# Patient Record
Sex: FEMALE | Race: WHITE | NOT HISPANIC OR LATINO | Employment: PART TIME | URBAN - METROPOLITAN AREA
[De-identification: names, ages, dates, MRNs, and addresses within clinical notes are randomized per-mention and may not be internally consistent; named-entity substitution may affect disease eponyms.]

---

## 2017-01-05 ENCOUNTER — HOSPITAL ENCOUNTER (OUTPATIENT)
Dept: RADIOLOGY | Facility: CLINIC | Age: 57
Discharge: HOME | End: 2017-01-05
Payer: COMMERCIAL

## 2017-01-05 DIAGNOSIS — Z11.3 ENCOUNTER FOR SCREENING FOR INFECTIONS WITH PREDOMINANTLY SEXUAL MODE OF TRANSMISSION: ICD-10-CM

## 2017-01-05 DIAGNOSIS — R07.81 PLEURODYNIA: ICD-10-CM

## 2017-01-05 DIAGNOSIS — Z12.31 ENCOUNTER FOR SCREENING MAMMOGRAM FOR MALIGNANT NEOPLASM OF BREAST: ICD-10-CM

## 2017-01-05 PROCEDURE — 71101 X-RAY EXAM UNILAT RIBS/CHEST: CPT

## 2017-02-03 ENCOUNTER — LAB REQUISITION (OUTPATIENT)
Dept: LAB | Facility: HOSPITAL | Age: 57
End: 2017-02-03
Payer: COMMERCIAL

## 2017-02-03 ENCOUNTER — ALLSCRIPTS OFFICE VISIT (OUTPATIENT)
Dept: OTHER | Facility: OTHER | Age: 57
End: 2017-02-03

## 2017-02-03 DIAGNOSIS — Z11.3 ENCOUNTER FOR SCREENING FOR INFECTIONS WITH PREDOMINANTLY SEXUAL MODE OF TRANSMISSION: ICD-10-CM

## 2017-02-03 PROCEDURE — 87591 N.GONORRHOEAE DNA AMP PROB: CPT | Performed by: OBSTETRICS & GYNECOLOGY

## 2017-02-03 PROCEDURE — 87491 CHLMYD TRACH DNA AMP PROBE: CPT | Performed by: OBSTETRICS & GYNECOLOGY

## 2017-02-08 LAB
CHLAMYDIA DNA CVX QL NAA+PROBE: NORMAL
N GONORRHOEA DNA GENITAL QL NAA+PROBE: NORMAL

## 2017-02-10 ENCOUNTER — APPOINTMENT (OUTPATIENT)
Dept: LAB | Facility: CLINIC | Age: 57
End: 2017-02-10
Payer: COMMERCIAL

## 2017-02-10 ENCOUNTER — ALLSCRIPTS OFFICE VISIT (OUTPATIENT)
Dept: OTHER | Facility: OTHER | Age: 57
End: 2017-02-10

## 2017-02-10 ENCOUNTER — HOSPITAL ENCOUNTER (OUTPATIENT)
Dept: RADIOLOGY | Facility: CLINIC | Age: 57
Discharge: HOME | End: 2017-02-10
Payer: COMMERCIAL

## 2017-02-10 DIAGNOSIS — M25.569 PAIN IN KNEE: ICD-10-CM

## 2017-02-10 DIAGNOSIS — Z11.3 ENCOUNTER FOR SCREENING FOR INFECTIONS WITH PREDOMINANTLY SEXUAL MODE OF TRANSMISSION: ICD-10-CM

## 2017-02-10 PROCEDURE — 36415 COLL VENOUS BLD VENIPUNCTURE: CPT

## 2017-02-10 PROCEDURE — 87389 HIV-1 AG W/HIV-1&-2 AB AG IA: CPT

## 2017-02-10 PROCEDURE — 87340 HEPATITIS B SURFACE AG IA: CPT

## 2017-02-10 PROCEDURE — 73562 X-RAY EXAM OF KNEE 3: CPT

## 2017-02-10 PROCEDURE — 86592 SYPHILIS TEST NON-TREP QUAL: CPT

## 2017-02-11 LAB — HBV SURFACE AG SER QL: NORMAL

## 2017-02-13 LAB
HIV 1+2 AB+HIV1 P24 AG SERPL QL IA: NORMAL
RPR SER QL: NORMAL

## 2017-03-03 ENCOUNTER — ALLSCRIPTS OFFICE VISIT (OUTPATIENT)
Dept: OTHER | Facility: OTHER | Age: 57
End: 2017-03-03

## 2017-03-08 ENCOUNTER — HOSPITAL ENCOUNTER (OUTPATIENT)
Dept: RADIOLOGY | Facility: MEDICAL CENTER | Age: 57
Discharge: HOME | End: 2017-03-08
Payer: COMMERCIAL

## 2017-03-08 DIAGNOSIS — C73 MALIGNANT NEOPLASM OF THYROID GLAND (HCC): ICD-10-CM

## 2017-03-08 PROCEDURE — 76536 US EXAM OF HEAD AND NECK: CPT

## 2017-03-10 ENCOUNTER — HOSPITAL ENCOUNTER (OUTPATIENT)
Dept: RADIOLOGY | Facility: HOSPITAL | Age: 57
Discharge: HOME | End: 2017-03-10
Payer: COMMERCIAL

## 2017-03-10 DIAGNOSIS — Z12.31 ENCOUNTER FOR SCREENING MAMMOGRAM FOR MALIGNANT NEOPLASM OF BREAST: ICD-10-CM

## 2017-03-10 PROCEDURE — G0202 SCR MAMMO BI INCL CAD: HCPCS

## 2017-03-10 PROCEDURE — 77063 BREAST TOMOSYNTHESIS BI: CPT

## 2017-03-12 ENCOUNTER — GENERIC CONVERSION - ENCOUNTER (OUTPATIENT)
Dept: OTHER | Facility: OTHER | Age: 57
End: 2017-03-12

## 2017-03-29 ENCOUNTER — ALLSCRIPTS OFFICE VISIT (OUTPATIENT)
Dept: OTHER | Facility: OTHER | Age: 57
End: 2017-03-29

## 2017-03-29 ENCOUNTER — APPOINTMENT (OUTPATIENT)
Dept: LAB | Facility: HOSPITAL | Age: 57
End: 2017-03-29
Payer: COMMERCIAL

## 2017-03-29 DIAGNOSIS — C73 MALIGNANT NEOPLASM OF THYROID GLAND (HCC): ICD-10-CM

## 2017-03-29 DIAGNOSIS — E89.0 POSTPROCEDURAL HYPOTHYROIDISM: ICD-10-CM

## 2017-03-29 LAB
T4 FREE SERPL-MCNC: 1.34 NG/DL (ref 0.76–1.46)
TSH SERPL DL<=0.05 MIU/L-ACNC: 0.99 UIU/ML (ref 0.36–3.74)

## 2017-03-29 PROCEDURE — 84443 ASSAY THYROID STIM HORMONE: CPT

## 2017-03-29 PROCEDURE — 36415 COLL VENOUS BLD VENIPUNCTURE: CPT

## 2017-03-29 PROCEDURE — 84432 ASSAY OF THYROGLOBULIN: CPT

## 2017-03-29 PROCEDURE — 84439 ASSAY OF FREE THYROXINE: CPT

## 2017-03-29 PROCEDURE — 86800 THYROGLOBULIN ANTIBODY: CPT

## 2017-04-04 ENCOUNTER — GENERIC CONVERSION - ENCOUNTER (OUTPATIENT)
Dept: OTHER | Facility: OTHER | Age: 57
End: 2017-04-04

## 2017-04-04 LAB
THYROGLOB AB SERPL-ACNC: 3.5 IU/ML (ref 0–0.9)
THYROGLOB SERPL-MCNC: <2 NG/ML

## 2017-08-25 ENCOUNTER — ALLSCRIPTS OFFICE VISIT (OUTPATIENT)
Dept: OTHER | Facility: OTHER | Age: 57
End: 2017-08-25

## 2017-10-17 ENCOUNTER — ALLSCRIPTS OFFICE VISIT (OUTPATIENT)
Dept: OTHER | Facility: OTHER | Age: 57
End: 2017-10-17

## 2017-10-17 DIAGNOSIS — Z12.11 ENCOUNTER FOR SCREENING FOR MALIGNANT NEOPLASM OF COLON: ICD-10-CM

## 2017-10-17 DIAGNOSIS — D64.9 ANEMIA: ICD-10-CM

## 2017-10-17 DIAGNOSIS — K92.1 MELENA: ICD-10-CM

## 2017-10-17 DIAGNOSIS — E55.9 VITAMIN D DEFICIENCY: ICD-10-CM

## 2017-10-17 DIAGNOSIS — K91.2 POSTSURGICAL MALABSORPTION, NOT ELSEWHERE CLASSIFIED (CODE): ICD-10-CM

## 2017-10-17 DIAGNOSIS — Z13.220 ENCOUNTER FOR SCREENING FOR LIPOID DISORDERS: ICD-10-CM

## 2017-10-20 ENCOUNTER — APPOINTMENT (OUTPATIENT)
Dept: LAB | Facility: CLINIC | Age: 57
End: 2017-10-20
Payer: COMMERCIAL

## 2017-10-20 DIAGNOSIS — Z13.220 ENCOUNTER FOR SCREENING FOR LIPOID DISORDERS: ICD-10-CM

## 2017-10-20 DIAGNOSIS — E55.9 VITAMIN D DEFICIENCY: ICD-10-CM

## 2017-10-20 DIAGNOSIS — D64.9 ANEMIA: ICD-10-CM

## 2017-10-20 DIAGNOSIS — K91.2 POSTSURGICAL MALABSORPTION, NOT ELSEWHERE CLASSIFIED (CODE): ICD-10-CM

## 2017-10-20 LAB
25(OH)D3 SERPL-MCNC: 34.4 NG/ML (ref 30–100)
ALBUMIN SERPL BCP-MCNC: 3.3 G/DL (ref 3.5–5)
ALP SERPL-CCNC: 83 U/L (ref 46–116)
ALT SERPL W P-5'-P-CCNC: 19 U/L (ref 12–78)
ANION GAP SERPL CALCULATED.3IONS-SCNC: 6 MMOL/L (ref 4–13)
AST SERPL W P-5'-P-CCNC: 21 U/L (ref 5–45)
BASOPHILS # BLD AUTO: 0.01 THOUSANDS/ΜL (ref 0–0.1)
BASOPHILS NFR BLD AUTO: 0 % (ref 0–1)
BILIRUB SERPL-MCNC: 0.51 MG/DL (ref 0.2–1)
BUN SERPL-MCNC: 7 MG/DL (ref 5–25)
CALCIUM SERPL-MCNC: 8.6 MG/DL (ref 8.3–10.1)
CHLORIDE SERPL-SCNC: 109 MMOL/L (ref 100–108)
CHOLEST SERPL-MCNC: 149 MG/DL (ref 50–200)
CO2 SERPL-SCNC: 26 MMOL/L (ref 21–32)
CREAT SERPL-MCNC: 0.68 MG/DL (ref 0.6–1.3)
EOSINOPHIL # BLD AUTO: 0.04 THOUSAND/ΜL (ref 0–0.61)
EOSINOPHIL NFR BLD AUTO: 1 % (ref 0–6)
ERYTHROCYTE [DISTWIDTH] IN BLOOD BY AUTOMATED COUNT: 16.7 % (ref 11.6–15.1)
FERRITIN SERPL-MCNC: 4 NG/ML (ref 8–388)
GFR SERPL CREATININE-BSD FRML MDRD: 98 ML/MIN/1.73SQ M
GLUCOSE P FAST SERPL-MCNC: 92 MG/DL (ref 65–99)
HCT VFR BLD AUTO: 30.1 % (ref 34.8–46.1)
HDLC SERPL-MCNC: 68 MG/DL (ref 40–60)
HGB BLD-MCNC: 8.5 G/DL (ref 11.5–15.4)
IRON SERPL-MCNC: 19 UG/DL (ref 50–170)
LDLC SERPL CALC-MCNC: 66 MG/DL (ref 0–100)
LYMPHOCYTES # BLD AUTO: 1.95 THOUSANDS/ΜL (ref 0.6–4.47)
LYMPHOCYTES NFR BLD AUTO: 45 % (ref 14–44)
MCH RBC QN AUTO: 21.4 PG (ref 26.8–34.3)
MCHC RBC AUTO-ENTMCNC: 28.2 G/DL (ref 31.4–37.4)
MCV RBC AUTO: 76 FL (ref 82–98)
MONOCYTES # BLD AUTO: 0.5 THOUSAND/ΜL (ref 0.17–1.22)
MONOCYTES NFR BLD AUTO: 11 % (ref 4–12)
NEUTROPHILS # BLD AUTO: 1.91 THOUSANDS/ΜL (ref 1.85–7.62)
NEUTS SEG NFR BLD AUTO: 43 % (ref 43–75)
NRBC BLD AUTO-RTO: 0 /100 WBCS
PLATELET # BLD AUTO: 282 THOUSANDS/UL (ref 149–390)
PMV BLD AUTO: 9.4 FL (ref 8.9–12.7)
POTASSIUM SERPL-SCNC: 4.7 MMOL/L (ref 3.5–5.3)
PROT SERPL-MCNC: 7 G/DL (ref 6.4–8.2)
RBC # BLD AUTO: 3.98 MILLION/UL (ref 3.81–5.12)
SODIUM SERPL-SCNC: 141 MMOL/L (ref 136–145)
T4 FREE SERPL-MCNC: 1.77 NG/DL (ref 0.76–1.46)
TRIGL SERPL-MCNC: 76 MG/DL
TSH SERPL DL<=0.05 MIU/L-ACNC: 0.01 UIU/ML (ref 0.36–3.74)
VIT B12 SERPL-MCNC: 1835 PG/ML (ref 100–900)
WBC # BLD AUTO: 4.42 THOUSAND/UL (ref 4.31–10.16)

## 2017-10-20 PROCEDURE — 84425 ASSAY OF VITAMIN B-1: CPT

## 2017-10-20 PROCEDURE — 80061 LIPID PANEL: CPT

## 2017-10-20 PROCEDURE — 82728 ASSAY OF FERRITIN: CPT

## 2017-10-20 PROCEDURE — 83036 HEMOGLOBIN GLYCOSYLATED A1C: CPT

## 2017-10-20 PROCEDURE — 84443 ASSAY THYROID STIM HORMONE: CPT

## 2017-10-20 PROCEDURE — 80053 COMPREHEN METABOLIC PANEL: CPT

## 2017-10-20 PROCEDURE — 82607 VITAMIN B-12: CPT

## 2017-10-20 PROCEDURE — 85025 COMPLETE CBC W/AUTO DIFF WBC: CPT

## 2017-10-20 PROCEDURE — 82306 VITAMIN D 25 HYDROXY: CPT

## 2017-10-20 PROCEDURE — 84439 ASSAY OF FREE THYROXINE: CPT

## 2017-10-20 PROCEDURE — 83540 ASSAY OF IRON: CPT

## 2017-10-20 PROCEDURE — 84207 ASSAY OF VITAMIN B-6: CPT

## 2017-10-21 LAB
EST. AVERAGE GLUCOSE BLD GHB EST-MCNC: 94 MG/DL
HBA1C MFR BLD: 4.9 % (ref 4.2–6.3)

## 2017-10-22 ENCOUNTER — GENERIC CONVERSION - ENCOUNTER (OUTPATIENT)
Dept: OTHER | Facility: OTHER | Age: 57
End: 2017-10-22

## 2017-10-23 LAB — VIT B1 BLD-SCNC: 107.6 NMOL/L (ref 66.5–200)

## 2017-10-25 LAB — VIT B6 SERPL-MCNC: 11.5 UG/L (ref 2–32.8)

## 2017-10-30 ENCOUNTER — APPOINTMENT (OUTPATIENT)
Dept: LAB | Facility: CLINIC | Age: 57
End: 2017-10-30
Payer: COMMERCIAL

## 2017-10-30 DIAGNOSIS — Z12.11 ENCOUNTER FOR SCREENING FOR MALIGNANT NEOPLASM OF COLON: ICD-10-CM

## 2017-10-30 LAB — HEMOCCULT STL QL IA: POSITIVE

## 2017-10-30 PROCEDURE — G0328 FECAL BLOOD SCRN IMMUNOASSAY: HCPCS

## 2017-10-31 ENCOUNTER — ALLSCRIPTS OFFICE VISIT (OUTPATIENT)
Dept: OTHER | Facility: OTHER | Age: 57
End: 2017-10-31

## 2017-10-31 ENCOUNTER — GENERIC CONVERSION - ENCOUNTER (OUTPATIENT)
Dept: OTHER | Facility: OTHER | Age: 57
End: 2017-10-31

## 2017-11-02 RX ORDER — SODIUM CHLORIDE 9 MG/ML
20 INJECTION, SOLUTION INTRAVENOUS ONCE
Status: COMPLETED | OUTPATIENT
Start: 2017-11-03 | End: 2017-11-03

## 2017-11-02 NOTE — PROGRESS NOTES
Assessment  1  Papillary carcinoma of thyroid (193) (C73)   2  Osteoporosis (733 00) (M81 0)   3  Postsurgical hypothyroidism (244 0) (E89 0)   4  Vitamin D deficiency (268 9) (E55 9)    Plan  Osteoporosis    · Forteo 600 MCG/2 4ML Subcutaneous Solution   Rx By: Tatianna Cohen; Dispense: 30 Days ; #:1 X 2 4 ML Pen; Refill: 6;For: Osteoporosis; CECIL = N; Verified Transmission to [facility] DUSTIN PETERSEN; Last Updated By: Curry Todd; 10/31/2017 1:12:32 PM  Papillary carcinoma of thyroid    · (1) T4, FREE; Status:Active; Requested for:68Sqd2791;    Perform:[facility]; Due:71Kys8464; Ordered; For:Papillary carcinoma of thyroid; Ordered By:Thuy Gonzáles;   · (1) THYROGLOBULIN/QUANT W/ANTIBODY PANEL; Status:Active; Requested  for:70Bzq5483;    Perform:[facility]; Due:11Mzh3504; Ordered; For:Papillary carcinoma of thyroid; Ordered By:Thuy Gonzáles;   · (1) TSH; Status:Active; Requested for:92Woh2108;    Perform:[facility]; Due:63Hka9281; Ordered; For:Papillary carcinoma of thyroid; Ordered By:Thuy Gonzáles;   · US HEAD NECK LYMPH NODE MAPPING; Status:Hold For - Scheduling; Requested  for:01Mar2018; Perform:[facility]; EZV:19ZES0156;UHIPFQE; For:Papillary carcinoma of thyroid; Ordered By:Thuy Gonzáles;   · Follow-up visit in 6 months Evaluation and Treatment  Follow-up  Status: Hold For -  Scheduling  Requested for: 20CRV2307   Ordered; For: Papillary carcinoma of thyroid; Ordered By: Tatianna Cohen Performed:  Due: 50QXA6817  Unlinked    · Melatonin 10 MG Oral Capsule   Dispense: 0 Days ; #:0 CAPS; Refill: 0; CECIL = N; Record; Last Updated By: Curry Todd; 10/31/2017 1:12:43 PM    Papillary Thyroid CA: Continue to monitor Thyroglobulin Panel and Ultrasound  Hypothyroidism: Recent lab testing showed overreplacement of Synthroid but she reports was taking 2 extra tabs per week instead of 1 extra tab per week due to fatigue    Return to taking 1 extra tab per week and check TSH/Free T4 in 6 weeks  Osteoporosis:  Took Forteo for 6 weeks and stopped for side effects of headache/nausea  These are not typical side effects of forteo and she did tolerate forteo in the past x 18 months  When her Anemia/GI Workup is complete and she is feeling back to normal she will give forteo another try  IF can not tolerate, she will let us know and will try to get coverage for prolia  Vitamin D Deficiency: Continue supplements  Follow up in 6 months     Chief Complaint  Chief Complaint Free Text Note Form: Follow Up      History of Present Illness  HPI: I had the pleasure of seeing Kassidy Aguila in the office today for follow-up of thyroid cancer, postsurgical hypothyroidism, Vitamin D Deficiency, and osteoporosis  She has a history of follicular variant papillary thyroid cancer with Oncocytic features  She underwent total completion thyroidectomy and radioactive iodine ablation in 2010  She underwent Thyrogen stimulated thyroglobulin level/Scan in 2014 which showed no evidence of recurrent/metastatic disease  Thyroid Antibodies have been mildly elevated/stable  Ultrasound 3/2017 showed no evidence of disease    her postsurgical hypothyroidism, she is currently taking Synthroid 112 mcg Mon-Fri and 2 tabs on Sat/sunday  She self increased this over the summer due to severe fatigue  Turns out she has severe iron deficiency anemia and Heme + stools and will be seeing GI for this and getting iron infusions  her osteoporosis, she has completed 18 months of the Forteo a few years back  Recently started an additional 6 month treatment course of forteo but stopped after 6 weeks due to headaches/nausea over the summer  In the past, she tolerated forteo with no problems    She had 1 treatment with reclast in November 2013 and second treatment 3/23/16  She would not tolerate/absorb oral bisphonates due to hx gastric bypass surgery   She is getting a lot of calcium in diet, but not taking supplements   She is taking her vitamin D 5000 units daily  She did have a right femoral neck stress fracture  This required surgical repair  Review of Systems  ROS Reviewed:   ROS reviewed  Endo Adult ROS Female Established v2 Update - [facility]:   Constitutional/General: recent weight gain,-- no recent weight loss,-- poor energy/fatigue,-- no increased energy level,-- insomnia/sleep problems,-- no fever-- and-- no feeling weak  Breasts: no nipple discharge  Heart: chest pain/tightness,-- rapid/racing heart rate-- and-- palpitations, but-- no high blood pressure  Genitourinary - Urinary: no frequent urination,-- no excess urination-- and-- no urinating during the night  Eyes: no blurred vision,-- no double vision,-- no bulging eyes,-- no gritty/scratchy eyes-- and-- no excessive tearing  Mouth / Throat: no hoarseness-- and-- no difficulty swallowing  Neck: no lumps,-- no swollen glands,-- no neck pain,-- no neck stiffness-- and-- no enlarged thyroid  Respiratory: no wheezing,-- no asthma-- and-- persistent cough  Musculoskeletal: muscle aches/pain,-- joint aches/pain-- and-- muscle weakness  Skin & Hair: no dry skin,-- no acne,-- the hair texture was not oily,-- no hair loss-- and-- no excessive hair growth  Gastrointestinal: no constipation,-- no diarrhea,-- no waking at night to drink-- and-- no stomach ache  Neurological: no blackouts,-- no weakness-- and-- no tremors  Reproductive: no discomfort with periods,-- no excessive bleeding with periods-- and-- no mood swings--   regular periods are not applicable  Endocrine: no feeling hot frequently,-- feeling cold frequently,-- no shifts between feeling hot and cold,-- cold hands or feet,-- no excessive sweating,-- thyroid problems,-- no blood sugar problems,-- no excessive thirst,-- no excessive hunger,-- no change in shoe size,-- no nausea or vomiting-- and-- no shaky hands  Active Problems  1   Anemia (285  9) (D64 9)   2  Anxiety (300 00) (F41 9)   3  Dupuytren's contracture (728 6) (M72 0)   4  Encounter for routine gynecological examination (V72 31) (Z01 419)   5  Encounter for screening mammogram for malignant neoplasm of breast (V76 12)   (Z12 31)   6  Hip pain (719 45) (M25 559)   7  Lipid screening (V77 91) (Z13 220)   8  Need for prophylactic vaccination and inoculation against influenza (V04 81) (Z23)   9  Osteoporosis (733 00) (M81 0)   10  Papillary carcinoma of thyroid (193) (C73)   11  Pernicious anemia (281 0) (D51 0)   12  Postgastrectomy malabsorption (579 3) (K91 2,Z90 3)   13  Postsurgical hypothyroidism (244 0) (E89 0)   14  Primary osteoarthritis of left knee (715 16) (M17 12)   15  Screen for STD (sexually transmitted disease) (V74 5) (Z11 3)   16  Screening for depression (V79 0) (Z13 89)   17  Seasonal allergies (477 9) (J30 2)   18  Special screening for malignant neoplasms, colon (V76 51) (Z12 11)   19  Status post gastric bypass for obesity (V45 86) (Z98 84)   20  Vitamin D deficiency (268 9) (E55 9)   21  Well adult on routine health check (V70 0) (Z00 00)    Past Medical History  1  History of Closed Rib Fracture (807 00)   2  History of fall (V15 88) (Z91 81)   3  History of hysterectomy (V88 01) (Z90 710)   4  History of malignant neoplasm of thyroid (V10 87) (Z85 850)   5  History of viral gastroenteritis (V12 09) (Z86 19)  Active Problems And Past Medical History Reviewed: The active problems and past medical history were reviewed and updated today  Surgical History  1  History of Appendectomy (47 0)   2  History of Breast Surgery   3  History of  Section   4  History of Cholecystectomy   5  History of Gastric Surgery For Morbid Obesity Gastric Bypass   6  History of Hip Surgery Right   7  History of Thyroid Surgery   8  History of Total Abdominal Hysterectomy  Surgical History Reviewed: The surgical history was reviewed and updated today         Family History  Mother    1  Family history of cardiac disorder (V17 49) (Z82 49)   2  Family history of diabetes mellitus (V18 0) (Z83 3)  Father    3  Family history of Cancer of mouth  Maternal Grandfather    4  Family history of Bone cancer   5  Family history of malignant neoplasm of brain (V16 8) (Z80 8)   6  Family history of Lung Cancer (V16 1)   7  Family history of Skin Cancer (V16 8)  Paternal Aunt    6  Family history of lung cancer (V16 1) (Z80 1)  Maternal Cousin    9  Family history of breast cancer (V16 3) (Z80 3)   10  Family history of Melanoma  Family History    11  Family history of Coronary Artery Disease (V17 49)   12  Family history of Osteoporosis (V17 81)   13  Family history of Rheumatoid Arthritis  Family History Reviewed: The family history was reviewed and updated today  Social History   · Being A Social Drinker   · Caffeine Use   · Daily Coffee Consumption (4  Cups/Day)   · Denied: History of Drug use   · Exercises regularly   · Never A Smoker  Social History Reviewed: The social history was reviewed and updated today  The social history was reviewed and is unchanged  Current Meds   1  ALPRAZolam 0 5 MG Oral Tablet; take 1 tablet every twelve hours; Last Rx:53Bxo6991   Ordered   2  BD Pen Needle Sarah U/F 32G X 4 MM Miscellaneous; Use 1 per day with forteo    Requested for: 14TWQ2039; Last Rx:15May2017 Ordered   3  Calcium 500 MG TABS; TAKE 1 TABLET DAILY; Therapy: 45OIB6581 to Recorded   4  Claritin 10 MG Oral Tablet; Therapy: (Recorded:97Kjl4908) to Recorded   5  Forteo 600 MCG/2 4ML Subcutaneous Solution; INJECT 20 MCG  SUBCUTANEOUSLY   ONCE DAILY AS DIRECTED; Therapy: 48KUC9238 to (Evaluate:69Egv1152)  Requested for: 04JRR9361; Last   Rx:21Cpb9030 Ordered   6  Yissel Root CAPS; Therapy: (Recorded:79Mle2189) to Recorded   7  Melatonin 10 MG Oral Capsule; Therapy: (Recorded:53Qqr1904) to Recorded   8   Synthroid 112 MCG Oral Tablet; TAKE 1 TABLET DAILY mon-sat and  2 tablets on sunday; Therapy: 03IFJ2483 to (Evaluate:24Jun2018)  Requested for: 49AYF0252; Last   Rx:29Jun2017 Ordered   9  Vitamin B12 TABS; Therapy: (Recorded:49Bfw1739) to Recorded   10  Vitamin D-3 5000 UNIT Oral Tablet; Therapy: (Recorded:22Oyh8902) to Recorded  Medication List Reviewed: The medication list was reviewed and updated today  Allergies  1  Bactrim TABS   2  Scopolamine HBr SOLN   3  Sulfa Drugs  4  Adhesive Tape   5  No Known Environmental Allergies   6  Seasonal    Vitals  Vital Signs    Recorded: 97XJF1711 01:12PM   Heart Rate 80   Systolic 418   Diastolic 78   Height 5 ft 7 in   Weight 161 lb 4 00 oz   BMI Calculated 25 26   BSA Calculated 1 85     Physical Exam    Constitutional   General appearance: No acute distress, well appearing and well nourished  Eyes   Conjunctiva and lids: No swelling, erythema, or discharge  Pupils: Equal, round and reactive to light  The sclera are anicteric  Extraocular movements are intact  Ears, Nose, Mouth, and Throat   External inspection of ears, nose and lips: Normal     Oropharynx: Normal with no erythema, edema, exudate or lesions  Exam of Head: The head is atraumatic and normocephalic  Neck: The neck is supple  The thyroid is normal in size with no palpable nodules  Pulmonary   Auscultation of lungs: Clear to auscultation bilaterally with normal chest expansion  Cardiovascular   Auscultation of heart: Normal rate and rhythm with no murmurs, gallops or rubs  Examination of pulses: Dorsalis pedal pulses are +2 and equal bilaterally  Examination of carotids: No bruit    Abdomen   Abdomen: Abdomen is soft, non-tender with normal bowel sounds  Lymphatic   Palpation of lymph nodes: No supraclavicular or suboccipital lymphadenopathy  Musculoskeletal   Inspection/palpation of joints, bones, and muscles: Muscle bulk and tone is normal     Skin   Skin and subcutaneous tissue: Normal skin temperature and color  Neurologic   Reflexes: 2+ and symmetric  Motor Strength: Strength is 5/5 bilaterally  Psychiatric   Orientation to person, place and time: Normal     Mood and affect: Affect and attention span are normal        Results/Data  (1) OCCULT BLOOD, FECAL IMMUNOCHEMICAL TEST 30Oct2017 01:48PM Mavis Sauer   TW Order Number: [id number]     Test Name Result Flag Reference   OCCULT BLD, FECAL IMMUNOLOGICAL Positive A Negative   Performed by Fecal Immunochemical Test      (1) CBC/PLT/DIFF 49SUX0299 07:23AM Mavis Sauer     Test Name Result Flag Reference   WBC COUNT 4 42 Thousand/uL  4 31-10 16   RBC COUNT 3 98 Million/uL  3 81-5 12   HEMOGLOBIN 8 5 g/dL L 11 5-15 4   HEMATOCRIT 30 1 % L 34 8-46  1   MCV 76 fL L 82-98   MCH 21 4 pg L 26 8-34 3   MCHC 28 2 g/dL L 31 4-37 4   RDW 16 7 % H 11 6-15 1   MPV 9 4 fL  8 9-12 7   PLATELET COUNT 239 Thousands/uL  149-390   nRBC AUTOMATED 0 /100 WBCs     NEUTROPHILS RELATIVE PERCENT 43 %  43-75   LYMPHOCYTES RELATIVE PERCENT 45 % H 14-44   MONOCYTES RELATIVE PERCENT 11 %  4-12   EOSINOPHILS RELATIVE PERCENT 1 %  0-6   BASOPHILS RELATIVE PERCENT 0 %  0-1   NEUTROPHILS ABSOLUTE COUNT 1 91 Thousands/? ??L  1 85-7 62   LYMPHOCYTES ABSOLUTE COUNT 1 95 Thousands/? ??L  0 60-4 47   MONOCYTES ABSOLUTE COUNT 0 50 Thousand/? ??L  0 17-1 22   EOSINOPHILS ABSOLUTE COUNT 0 04 Thousand/? ??L  0 00-0 61   BASOPHILS ABSOLUTE COUNT 0 01 Thousands/? ??L  0 00-0 10     (1) COMPREHENSIVE METABOLIC PANEL 94AWD1880 59:24VP Mavis Sauer     Test Name Result Flag Reference   SODIUM 141 mmol/L  136-145   POTASSIUM 4 7 mmol/L  3 5-5 3   CHLORIDE 109 mmol/L H 100-108   CARBON DIOXIDE 26 mmol/L  21-32   ANION GAP (CALC) 6 mmol/L  4-13   BLOOD UREA NITROGEN 7 mg/dL  5-25   CREATININE 0 68 mg/dL  0 60-1 30   Standardized to IDMS reference method   CALCIUM 8 6 mg/dL  8 3-10 1   BILI, TOTAL 0 51 mg/dL  0 20-1 00   ALK PHOSPHATAS 83 U/L     ALT (SGPT) 19 U/L  12-78   Specimen collection should occur prior to Sulfasalazine and/or Sulfapyridine administration due to the potential for falsely depressed results  AST(SGOT) 21 U/L  5-45   Specimen collection should occur prior to Sulfasalazine administration due to the potential for falsely depressed results  ALBUMIN 3 3 g/dL L 3 5-5 0   TOTAL PROTEIN 7 0 g/dL  6 4-8 2   eGFR 98 ml/min/1 73sq m     National Kidney Disease Education Program recommendations are as follows:  GFR calculation is accurate only with a steady state creatinine  Chronic Kidney disease less than 60 ml/min/1 73 sq  meters  Kidney failure less than 15 ml/min/1 73 sq  meters  GLUCOSE FASTING 92 mg/dL  65-99   Specimen collection should occur prior to Sulfasalazine administration due to the potential for falsely depressed results  Specimen collection should occur prior to Sulfapyridine administration due to the potential for falsely elevated results  (1) HEMOGLOBIN A1C 20Oct2017 07:23AM Vinay Hard     Test Name Result Flag Reference   HEMOGLOBIN A1C 4 9 %  4 2-6 3   EST  AVG  GLUCOSE 94 mg/dl       (1) LIPID PANEL FASTING W DIRECT LDL REFLEX 20Oct2017 07:23AM Vinay Hard     Test Name Result Flag Reference   CHOLESTEROL 149 mg/dL     LDL CHOLESTEROL CALCULATED 66 mg/dL  0-100   Triglyceride:        Normal <150 mg/dl   Borderline High 150-199 mg/dl   High 200-499 mg/dl   Very High >499 mg/dl      Cholesterol:       Desirable <200 mg/dl    Borderline High 200-239 mg/dl    High >239 mg/dl      HDL Cholesterol:       High>59 mg/dL    Low <41 mg/dL      HDL Cholesterol:       High>59 mg/dL    Low <41 mg/dL      This screening LDL is a calculated result  It does not have the accuracy of the Direct Measured LDL in the monitoring of patients with hyperlipidemia and/or statin therapy  Direct Measure LDL (ZWF779) must be ordered separately in these patients     TRIGLYCERIDES 76 mg/dL  <=150   Specimen collection should occur prior to N-Acetylcysteine or Metamizole administration due to the potential for falsely depressed results  HDL,DIRECT 68 mg/dL H 40-60   Specimen collection should occur prior to Metamizole administration due to the potential for falsley depressed results  (1) TSH WITH FT4 REFLEX 20Oct2017 07:23AM Gilda Proffer     Test Name Result Flag Reference   TSH 0 012 uIU/mL L 0 358-3 740   Patients undergoing fluorescein dye angiography may retain small amounts of fluorescein in the body for 48-72 hours post procedure  Samples containing fluorescein can produce falsely depressed TSH values  If the patient had this procedure,a specimen should be resubmitted post fluorescein clearance  The recommended reference ranges for TSH during pregnancy are as follows:  First trimester 0 1 to 2 5 uIU/mL  Second trimester  0 2 to 3 0 uIU/mL  Third trimester 0 3 to 3 0 uIU/m   T4,FREE 1 77 ng/dL H 0 76-1 46   Specimen collection should occur prior to Sulfasalazine administration due to the potential for falsely elevated results  (1) IRON 53CIB5513 07:23AM Gilda Proffer     Test Name Result Flag Reference   IRON 19 ug/dL L    Patients treated with metal-binding drugs (ie  Deferoxamine) may have depressed iron values       (1) FERRITIN 44ZLA1657 07:23AM Gilda Proffer     Test Name Result Flag Reference   FERRITIN 4 ng/mL L 8-388     (1) THYROGLOBULIN/QUANT W/ANTIBODY PANEL 76RDD1879 04:14PM Arnel Huggins Order Number: [id number]     Test Name Result Flag Reference   THYROGLOB AB 3 5 IU/mL H 0 0 - 0 9   Thyroglobulin Antibody measured by [facility] Methodology    Performed at:  [address]  [id number]  : Jann Ellis MD, Phone:  [phone]   THYROGLOBULIN (TG-KEV) <2 0 ng/mL     Reference Range:  Pubertal Children  and Adults: <40  According to the [facility] of Clinical Biochemistry,  the reference interval for Thyroglobulin (TG) should be  related to euthyroid patients and not for patients who  underwent thyroidectomy  TG reference intervals for these  patients depend on the residual mass of the thyroid tissue  left after surgery  Establishing a post-operative baseline  is recommended  The assay quantitation limit is 2 0 ng/mL  Performed at:  [address] [facility]  [address] [place] [address]  [de-identified]  : Karen Lopez MD, Phone:  [phone] [address] [id number] 01:21PM Sasah Rodríguez Order Number: [id number]    - Patient Instructions: To schedule this appointment, please contact Central Scheduling at [phone]  Test Name Result Flag Reference   US HEAD NECK LYMPH NODE [address] [facility] (Report)     NECK ULTRASOUND     INDICATION:  History of papillary carcinoma  COMPARISON: 3/14/2016     FINDINGS:     Ultrasound of the thyroidectomy bed and cervical lymph node chains was performed with a high frequency linear transducer  There is no suspicion of recurrent mass in the thyroidectomy bed  Lymph nodes maintain normal morphologic contour, echogenicity and short axis dimensions of less than 0 7 cm  No evidence for microcalcification or focal nodularity  IMPRESSION:     No evidence of recurrent or metastatic disease  Workstation performed: [id number]     Signed by:   Zachary Kohler MD   3/9/17     Health Management  Encounter for routine gynecological examination   BREAST EXAM; every 1 year; Last 87FTH8221; Next Due: 43Vqe7307; Active  PELVIC EXAM; every 1 year; Last 88BQV7716; Next Due: 22Qzz1629;  Active    Future Appointments    Date/Time Provider Specialty Site   10/18/2018 09:15 AM Amanda Davis DO Family Medicine [facility] [facility]     Signatures   Electronically signed by : Hailey Anaya [facility]; Oct 31 2017  1:49PM EST                       (Author)    Electronically signed by : CONRAD Paulino ; Nov 1 2017  9:10AM EST

## 2017-11-03 ENCOUNTER — HOSPITAL ENCOUNTER (OUTPATIENT)
Dept: INFUSION CENTER | Facility: HOSPITAL | Age: 57
Discharge: HOME | End: 2017-11-03
Payer: COMMERCIAL

## 2017-11-03 VITALS
HEART RATE: 79 BPM | DIASTOLIC BLOOD PRESSURE: 83 MMHG | RESPIRATION RATE: 18 BRPM | SYSTOLIC BLOOD PRESSURE: 142 MMHG | OXYGEN SATURATION: 100 % | TEMPERATURE: 97.4 F

## 2017-11-03 PROCEDURE — 96365 THER/PROPH/DIAG IV INF INIT: CPT

## 2017-11-03 PROCEDURE — 96366 THER/PROPH/DIAG IV INF ADDON: CPT

## 2017-11-03 RX ADMIN — IRON SUCROSE 300 MG: 20 INJECTION, SOLUTION INTRAVENOUS at 08:41

## 2017-11-03 RX ADMIN — SODIUM CHLORIDE 20 ML/HR: 0.9 INJECTION, SOLUTION INTRAVENOUS at 08:43

## 2017-11-03 NOTE — PLAN OF CARE
Problem: Potential for Falls  Goal: Patient will remain free of falls  INTERVENTIONS:  - Assess patient frequently for physical needs  -  Identify cognitive and physical deficits and behaviors that affect risk of falls    -  [place] fall precautions as indicated by assessment   - Educate patient/family on patient safety including physical limitations  - Instruct patient to call for assistance with activity based on assessment  - Modify environment to reduce risk of injury  - Consider OT/PT consult to assist with strengthening/mobility   Outcome: Progressing

## 2017-11-08 ENCOUNTER — ALLSCRIPTS OFFICE VISIT (OUTPATIENT)
Dept: OTHER | Facility: OTHER | Age: 57
End: 2017-11-08

## 2017-11-09 RX ORDER — SODIUM CHLORIDE 9 MG/ML
20 INJECTION, SOLUTION INTRAVENOUS ONCE
Status: COMPLETED | OUTPATIENT
Start: 2017-11-11 | End: 2017-11-11

## 2017-11-10 NOTE — CONSULTS
Assessment  1  Anemia (285 9) (D64 9)   2  Blood in stool (578 1) (K92 1)   3  GERD (gastroesophageal reflux disease) (530 81) (K21 9)   4  Change in bowel habits (787 99) (R19 4)   5  Status post gastric bypass for obesity (V45 86) (Z98 84)   6  Special screening for malignant neoplasms, colon (V76 51) (Z12 11)    Plan  Anemia, Blood in stool    · (1) CBC/ PLT (NO DIFF); Status:Active; Requested TSV:[id number];    Perform:[facility]; YFV:[id number]; Ordered; For:Anemia, Blood in stool; Ordered By:Melida Vallecillo;  GERD (gastroesophageal reflux disease)    · COLONOSCOPY (GI, SURG); Status:Hold For - Scheduling; Requested WGU:[id number];    Perform:[facility]; BSR:59LHB5586; Ordered; For:GERD (gastroesophageal reflux disease); Ordered By:SIM Vallecillo;   · EGD; Status:Hold For - Scheduling; Requested EDZ:42KJR3969;    Perform:[facility]; PMB:44RKI0068;VCBDTQQ;(RSZQNCRRIEDUFQDY reflux disease); Ordered By:Melida Vallecillo;    Discussion/Summary  Discussion Summary: 1  Iron deficiency anemia likely secondary to malabsorption however patient is also noted to have positive fecal occult blood testing, therefore will plan for EGD and colonoscopy  If EGD and colonoscopy are unremarkable, she will need PillCam for further evaluation  CBC, as patient complains of fatigue  is instructed to go to the emergency room should she have worsening fatigue, palpitations or chest pain  is starting her iron infusions, first infusion as this Saturday  Intermittent episodes of reflux, doing well on when necessary Prilosec  NSAIDs  Colon cancer screening: average risk, no prior colonoscopy, patient is noted to be anemic and has positive fecal occult blood test  We'll schedule colonoscopy  She states that her bowel movements are softer this year than they have in the past, could be related to diet or secondary to thyroid  Encourage high-fiber diet        Chief Complaint  Chief Complaint Free Text Note Form: Iron deficiency anemia      History of Present Illness  HPI: This 28-year-old female With history of papillary thyroid cancer status post radiation and iodine treatment,who comes in for evaluation of iron deficiency anemia  Patient has history of gastric bypass in 2003 and hysterectomy in 2003  Patient takes iron supplementation intermittently  She denies melena, hematemesis or hematochezia  Most recently, she was noted to have positive FOBT  She does complain of intermittent episodes of heartburn over the past several months, takes omeprazole on an as needed basis  She denies dysphagia, hematemesis or hematochezia  She denies any unintentional weight loss  Takes NSAIDs intermittently  She does complain of fatigue but denies chest pain, she also complains of palpitations, states that she has PVCs at baseline, this is unchanged from that  recent blood work shows hemoglobin of 8 5, platelets 498, MCV 76  TSH was 0 012  Review of Systems  Complete-Female GI Adult:  Constitutional: No fever, no chills, feels well, no tiredness, no recent weight gain or weight loss  Eyes: No complaints of eye pain, no red eyes, no eyesight problems, no discharge, no dry eyes, no itching of eyes  ENT: no complaints of earache, no loss of hearing, no nose bleeds, no nasal discharge, no sore throat, no hoarseness  Cardiovascular: No complaints of slow heart rate, no fast heart rate, no chest pain, no palpitations, no leg claudication, no lower extremity edema  Respiratory: No complaints of shortness of breath, no wheezing, no cough, no SOB on exertion, no orthopnea, no PND  Gastrointestinal: as noted in HPI  Genitourinary: No complaints of dysuria, no incontinence, no pelvic pain, no dysmenorrhea, no vaginal discharge or bleeding  Musculoskeletal: No complaints of arthralgias, no myalgias, no joint swelling or stiffness, no limb pain or swelling    Integumentary: No complaints of skin rash or lesions, no itching, no skin wounds, no breast pain or lump   Neurological: No complaints of headache, no confusion, no convulsions, no numbness, no dizziness or fainting, no tingling, no limb weakness, no difficulty walking  Psychiatric: Not suicidal, no sleep disturbance, no anxiety or depression, no change in personality, no emotional problems  Endocrine: No complaints of proptosis, no hot flashes, no muscle weakness, no deepening of the voice, no feelings of weakness  Hematologic/Lymphatic: No complaints of swollen glands, no swollen glands in the neck, does not bleed easily, does not bruise easily  ROS Reviewed:   ROS reviewed  Active Problems    1  Anemia (285 9) (D64 9)   2  Anxiety (300 00) (F41 9)   3  Dupuytren's contracture (728 6) (M72 0)   4  Encounter for routine gynecological examination (V72 31) (Z01 419)   5  Encounter for screening mammogram for malignant neoplasm of breast (V76 12) (Z12 31)   6  Hip pain (719 45) (M25 559)   7  Lipid screening (V77 91) (Z13 220)   8  Need for prophylactic vaccination and inoculation against influenza (V04 81) (Z23)   9  Osteoporosis (733 00) (M81 0)   10  Papillary carcinoma of thyroid (193) (C73)   11  Pernicious anemia (281 0) (D51 0)   12  Postgastrectomy malabsorption (579 3) (K91 2,Z90 3)   13  Postsurgical hypothyroidism (244 0) (E89 0)   14  Primary osteoarthritis of left knee (715 16) (M17 12)   15  Screen for STD (sexually transmitted disease) (V74 5) (Z11 3)   16  Screening for depression (V79 0) (Z13 89)   17  Seasonal allergies (477 9) (J30 2)   18  Special screening for malignant neoplasms, colon (V76 51) (Z12 11)   19  Status post gastric bypass for obesity (V45 86) (Z98 84)   20  Vitamin D deficiency (268 9) (E55 9)   21  Well adult on routine health check (V70 0) (Z00 00)    Past Medical History  1  History of Closed Rib Fracture (807 00)   2  History of fall (V15 88) (Z91 81)   3  History of hysterectomy (V88 01) (Z90 710)   4  History of malignant neoplasm of thyroid (V10 87) (Z85 850)   5  History of viral gastroenteritis (V12 09) (Z86 19)  Active Problems And Past Medical History Reviewed: The active problems and past medical history were reviewed and updated today  Surgical History  1  History of Appendectomy (47 0)   2  History of Breast Surgery   3  History of  Section   4  History of Cholecystectomy   5  History of Gastric Surgery For Morbid Obesity Gastric Bypass   6  History of Hip Surgery Right   7  History of Thyroid Surgery   8  History of Total Abdominal Hysterectomy  Surgical History Reviewed: The surgical history was reviewed and updated today  Family History  Mother    1  Family history of cardiac disorder (V17 49) (Z82 49)   2  Family history of diabetes mellitus (V18 0) (Z83 3)  Father    3  Family history of Cancer of mouth  Maternal Grandfather    4  Family history of Bone cancer   5  Family history of malignant neoplasm of brain (V16 8) (Z80 8)   6  Family history of Lung Cancer (V16 1)   7  Family history of Skin Cancer (V16 8)  Paternal Aunt    6  Family history of lung cancer (V16 1) (Z80 1)  Maternal Cousin    9  Family history of breast cancer (V16 3) (Z80 3)   10  Family history of Melanoma  Family History    11  Family history of Coronary Artery Disease (V17 49)   12  Family history of Osteoporosis (V17 81)   13  Family history of Rheumatoid Arthritis  Family History Reviewed: The family history was reviewed and updated today  Social History     · Being A Social Drinker   · Caffeine Use   · Daily Coffee Consumption (4  Cups/Day)   · Denied: History of Drug use   · Exercises regularly   · Never A Smoker  Social History Reviewed: The social history was reviewed and updated today  Current Meds   1  ALPRAZolam 0 5 MG Oral Tablet; take 1 tablet every twelve hours; Last Rx:2017 Ordered   2  BD Pen Needle Sarah U/F 32G X 4 MM Miscellaneous; Use 1 per day with forteo  Requested for: 72KAN7473; Last Rx:08Lvk8660 Ordered   3   Calcium 500 MG TABS; TAKE 1 TABLET DAILY; Therapy: 21MHF9651 to Recorded   4  Claritin 10 MG Oral Tablet; Therapy: (Recorded:47Alu5032) to Recorded   5  Yissel Root CAPS; Therapy: (Recorded:52Ayq5359) to Recorded   6  Synthroid 112 MCG Oral Tablet; TAKE 1 TABLET DAILY mon-sat and  2 tablets on sunday; Therapy: 08JNX2982 to (Evaluate:24Jun2018)  Requested for: 83SFS6513; Last Rx:29Jun2017 Ordered   7  Vitamin B12 TABS; Therapy: (Recorded:41Fig1361) to Recorded   8  Vitamin D-3 5000 UNIT Oral Tablet; Therapy: (Recorded:93Mbg6315) to Recorded  Medication List Reviewed: The medication list was reviewed and updated today  Allergies  1  Bactrim TABS   2  Scopolamine HBr SOLN   3  Sulfa Drugs  4  Adhesive Tape   5  No Known Environmental Allergies   6  Seasonal    Vitals  Vital Signs    Recorded: 13EMJ3020 03:49PM   Temperature 97 3 F   Heart Rate 82   Systolic 606   Diastolic 66   Weight 346 lb    BMI Calculated 24 75   BSA Calculated 1 83   O2 Saturation 98       Physical Exam   Constitutional  General appearance: No acute distress, well appearing and well nourished  Eyes  Conjunctiva and lids: No swelling, erythema or discharge  Pulmonary  Respiratory effort: No increased work of breathing or signs of respiratory distress  Auscultation of lungs: Clear to auscultation  Cardiovascular  Palpation of heart: Normal PMI, no thrills  Auscultation of heart: Normal rate and rhythm, normal S1 and S2, without murmurs  Examination of extremities for edema and/or varicosities: Normal    Abdomen  Abdomen: Non-tender, no masses  Liver and spleen: No hepatomegaly or splenomegaly  Lymphatic  Palpation of lymph nodes in neck: No lymphadenopathy  Skin  Skin and subcutaneous tissue: Normal without rashes or lesions     Psychiatric  Orientation to person, place, and time: Normal    Mood and affect: Normal          Results/Data  (1) OCCULT BLOOD, FECAL IMMUNOCHEMICAL TEST 30Oct2017 01:48PM Leo VIZCAINO Order Number: [id number]     Test Name Result Flag Reference   OCCULT BLD, FECAL IMMUNOLOGICAL Positive A Negative   Performed by Fecal Immunochemical Test      (1) CBC/PLT/DIFF 74HYV5054 07:23AM [facility]     Test Name Result Flag Reference   WBC COUNT 4 42 Thousand/uL  4 31-10 16   RBC COUNT 3 98 Million/uL  3 81-5 12   HEMOGLOBIN 8 5 g/dL L 11 5-15 4   HEMATOCRIT 30 1 % L 34 8-46  1   MCV 76 fL L 82-98   MCH 21 4 pg L 26 8-34 3   MCHC 28 2 g/dL L 31 4-37 4   RDW 16 7 % H 11 6-15 1   MPV 9 4 fL  8 9-12 7   PLATELET COUNT 635 Thousands/uL  149-390   nRBC AUTOMATED 0 /100 WBCs     NEUTROPHILS RELATIVE PERCENT 43 %  43-75   LYMPHOCYTES RELATIVE PERCENT 45 % H 14-44   MONOCYTES RELATIVE PERCENT 11 %  4-12   EOSINOPHILS RELATIVE PERCENT 1 %  0-6   BASOPHILS RELATIVE PERCENT 0 %  0-1   NEUTROPHILS ABSOLUTE COUNT 1 91 Thousands/? ??L  1 85-7 62   LYMPHOCYTES ABSOLUTE COUNT 1 95 Thousands/? ??L  0 60-4 47   MONOCYTES ABSOLUTE COUNT 0 50 Thousand/? ??L  0 17-1 22   EOSINOPHILS ABSOLUTE COUNT 0 04 Thousand/? ??L  0 00-0 61   BASOPHILS ABSOLUTE COUNT 0 01 Thousands/? ??L  0 00-0 10     (1) COMPREHENSIVE METABOLIC PANEL 32LMK3248 15:18MG [facility]     Test Name Result Flag Reference   SODIUM 141 mmol/L  136-145   POTASSIUM 4 7 mmol/L  3 5-5 3   CHLORIDE 109 mmol/L H 100-108   CARBON DIOXIDE 26 mmol/L  21-32   ANION GAP (CALC) 6 mmol/L  4-13   BLOOD UREA NITROGEN 7 mg/dL  5-25   CREATININE 0 68 mg/dL  0 60-1 30   Standardized to IDMS reference method   CALCIUM 8 6 mg/dL  8 3-10 1   BILI, TOTAL 0 51 mg/dL  0 20-1 00   ALK PHOSPHATAS 83 U/L     ALT (SGPT) 19 U/L  12-78   Specimen collection should occur prior to Sulfasalazine and/or Sulfapyridine administration due to the potential for falsely depressed results  AST(SGOT) 21 U/L  5-45   Specimen collection should occur prior to Sulfasalazine administration due to the potential for falsely depressed results     ALBUMIN 3 3 g/dL L 3 5-5 0   TOTAL PROTEIN 7 0 g/dL  6 4-8 2 eGFR 98 ml/min/1 73sq m       National Kidney Disease Education Program recommendations are as follows: GFR calculation is accurate only with a steady state creatinine Chronic Kidney disease less than 60 ml/min/1 73 sq  meters Kidney failure less than 15 ml/min/1 73 sq  meters  GLUCOSE FASTING 92 mg/dL  65-99   Specimen collection should occur prior to Sulfasalazine administration due to the potential for falsely depressed results  Specimen collection should occur prior to Sulfapyridine administration due to the potential for falsely elevated results  (1) TSH WITH FT4 REFLEX 20Oct2017 07:23AM [email or web address]     Test Name Result Flag Reference   TSH 0 012 uIU/mL L 0 358-3 740     Patients undergoing fluorescein dye angiography may retain small amounts of fluorescein in the body for 48-72 hours post procedure  Samples containing fluorescein can produce falsely depressed TSH values  If the patient had this procedure,a specimen should be resubmitted post fluorescein clearance  The recommended reference ranges for TSH during pregnancy are as follows: First trimester 0 1 to 2 5 uIU/mL Second trimester  0 2 to 3 0 uIU/mL Third trimester 0 3 to 3 0 uIU/m   T4,FREE 1 77 ng/dL H 0 76-1 46   Specimen collection should occur prior to Sulfasalazine administration due to the potential for falsely elevated results  (1) IRON [id number] 07:23AM [email or web address]     Test Name Result Flag Reference   IRON 19 ug/dL L    Patients treated with metal-binding drugs (ie  Deferoxamine) may have depressed iron values  (1) FERRITIN 21GGL4714 07:23AM [facility]     Test Name Result Flag Reference   FERRITIN 4 ng/mL L 8-388     (1) VITAMIN B12 84PHV9526 07:23AM [facility]     Test Name Result Flag Reference   VITAMIN B12 1835 pg/mL H 100-900     Future Appointments    Date/Time Provider Specialty Site   04/30/2018 08:20 CONRAD Juan   Endocrinology [address] [facility] ENDOCRINOLOGY   10/18/2018 09:15 AM Paul Campbell Violeta [address] 1   01/11/2018 09:45 AM CONRAD Gray   Gastroenterology Adult [address] [facility]       Signatures   Electronically signed by : CONRAD Cummings ; Nov 8 2017  4:27PM EST                       (Author)

## 2017-11-11 ENCOUNTER — HOSPITAL ENCOUNTER (OUTPATIENT)
Dept: INFUSION CENTER | Facility: HOSPITAL | Age: 57
Discharge: HOME | End: 2017-11-11
Payer: COMMERCIAL

## 2017-11-11 VITALS
DIASTOLIC BLOOD PRESSURE: 82 MMHG | RESPIRATION RATE: 18 BRPM | TEMPERATURE: 98.5 F | HEART RATE: 64 BPM | SYSTOLIC BLOOD PRESSURE: 134 MMHG

## 2017-11-11 LAB
BASOPHILS # BLD AUTO: 0.02 THOUSANDS/ΜL (ref 0–0.1)
BASOPHILS NFR BLD AUTO: 0 % (ref 0–1)
EOSINOPHIL # BLD AUTO: 0.06 THOUSAND/ΜL (ref 0–0.61)
EOSINOPHIL NFR BLD AUTO: 1 % (ref 0–6)
ERYTHROCYTE [DISTWIDTH] IN BLOOD BY AUTOMATED COUNT: 18.6 % (ref 11.6–15.1)
HCT VFR BLD AUTO: 32 % (ref 34.8–46.1)
HGB BLD-MCNC: 9.6 G/DL (ref 11.5–15.4)
LYMPHOCYTES # BLD AUTO: 2.29 THOUSANDS/ΜL (ref 0.6–4.47)
LYMPHOCYTES NFR BLD AUTO: 47 % (ref 14–44)
MCH RBC QN AUTO: 22.6 PG (ref 26.8–34.3)
MCHC RBC AUTO-ENTMCNC: 30 G/DL (ref 31.4–37.4)
MCV RBC AUTO: 76 FL (ref 82–98)
MONOCYTES # BLD AUTO: 0.28 THOUSAND/ΜL (ref 0.17–1.22)
MONOCYTES NFR BLD AUTO: 6 % (ref 4–12)
NEUTROPHILS # BLD AUTO: 2.23 THOUSANDS/ΜL (ref 1.85–7.62)
NEUTS SEG NFR BLD AUTO: 46 % (ref 43–75)
NRBC BLD AUTO-RTO: 0 /100 WBCS
PLATELET # BLD AUTO: 295 THOUSANDS/UL (ref 149–390)
PMV BLD AUTO: 8.8 FL (ref 8.9–12.7)
RBC # BLD AUTO: 4.24 MILLION/UL (ref 3.81–5.12)
WBC # BLD AUTO: 4.89 THOUSAND/UL (ref 4.31–10.16)

## 2017-11-11 PROCEDURE — 85025 COMPLETE CBC W/AUTO DIFF WBC: CPT | Performed by: INTERNAL MEDICINE

## 2017-11-11 PROCEDURE — 96365 THER/PROPH/DIAG IV INF INIT: CPT

## 2017-11-11 PROCEDURE — 96366 THER/PROPH/DIAG IV INF ADDON: CPT

## 2017-11-11 PROCEDURE — 96367 TX/PROPH/DG ADDL SEQ IV INF: CPT

## 2017-11-11 RX ADMIN — ONDANSETRON 4 MG: 2 INJECTION INTRAMUSCULAR; INTRAVENOUS at 10:35

## 2017-11-11 RX ADMIN — IRON SUCROSE 300 MG: 20 INJECTION, SOLUTION INTRAVENOUS at 10:54

## 2017-11-11 RX ADMIN — SODIUM CHLORIDE 20 ML/HR: 0.9 INJECTION, SOLUTION INTRAVENOUS at 10:20

## 2017-11-11 NOTE — PLAN OF CARE
Problem: Potential for Falls  Goal: Patient will remain free of falls  INTERVENTIONS:  - Assess patient frequently for physical needs  -  Identify cognitive and physical deficits and behaviors that affect risk of falls    -  [place] fall precautions as indicated by assessment   - Educate patient/family on patient safety including physical limitations  - Instruct patient to call for assistance with activity based on assessment  - Modify environment to reduce risk of injury  - Consider OT/PT consult to assist with strengthening/mobility   Outcome: Progressing

## 2017-11-13 ENCOUNTER — GENERIC CONVERSION - ENCOUNTER (OUTPATIENT)
Dept: OTHER | Facility: OTHER | Age: 57
End: 2017-11-13

## 2017-11-14 ENCOUNTER — ANESTHESIA EVENT (OUTPATIENT)
Dept: PERIOP | Facility: AMBULARY SURGERY CENTER | Age: 57
End: 2017-11-14
Payer: COMMERCIAL

## 2017-11-16 RX ORDER — SODIUM CHLORIDE 9 MG/ML
20 INJECTION, SOLUTION INTRAVENOUS ONCE
Status: COMPLETED | OUTPATIENT
Start: 2017-11-17 | End: 2017-11-17

## 2017-11-17 ENCOUNTER — HOSPITAL ENCOUNTER (OUTPATIENT)
Dept: INFUSION CENTER | Facility: HOSPITAL | Age: 57
Discharge: HOME | End: 2017-11-17
Payer: COMMERCIAL

## 2017-11-17 VITALS
RESPIRATION RATE: 18 BRPM | TEMPERATURE: 97.7 F | SYSTOLIC BLOOD PRESSURE: 120 MMHG | DIASTOLIC BLOOD PRESSURE: 70 MMHG | HEART RATE: 68 BPM

## 2017-11-17 PROCEDURE — 96367 TX/PROPH/DG ADDL SEQ IV INF: CPT

## 2017-11-17 PROCEDURE — 96365 THER/PROPH/DIAG IV INF INIT: CPT

## 2017-11-17 PROCEDURE — 96366 THER/PROPH/DIAG IV INF ADDON: CPT

## 2017-11-17 RX ADMIN — IRON SUCROSE 300 MG: 20 INJECTION, SOLUTION INTRAVENOUS at 14:44

## 2017-11-17 RX ADMIN — SODIUM CHLORIDE 20 ML/HR: 0.9 INJECTION, SOLUTION INTRAVENOUS at 14:24

## 2017-11-17 RX ADMIN — ONDANSETRON 4 MG: 2 INJECTION INTRAMUSCULAR; INTRAVENOUS at 14:24

## 2017-11-17 NOTE — PLAN OF CARE
Problem: Potential for Falls  Goal: Patient will remain free of falls  INTERVENTIONS:  - Assess patient frequently for physical needs  -  Identify cognitive and physical deficits and behaviors that affect risk of falls    -  [place] fall precautions as indicated by assessment   - Educate patient/family on patient safety including physical limitations  - Instruct patient to call for assistance with activity based on assessment  - Modify environment to reduce risk of injury  - Consider OT/PT consult to assist with strengthening/mobility   Outcome: Progressing

## 2017-11-21 ENCOUNTER — GENERIC CONVERSION - ENCOUNTER (OUTPATIENT)
Dept: OTHER | Facility: OTHER | Age: 57
End: 2017-11-21

## 2017-11-21 ENCOUNTER — ANESTHESIA (OUTPATIENT)
Dept: PERIOP | Facility: AMBULARY SURGERY CENTER | Age: 57
End: 2017-11-21
Payer: COMMERCIAL

## 2017-11-21 ENCOUNTER — HOSPITAL ENCOUNTER (OUTPATIENT)
Facility: AMBULARY SURGERY CENTER | Age: 57
Setting detail: OUTPATIENT SURGERY
Discharge: HOME | End: 2017-11-21
Attending: INTERNAL MEDICINE | Admitting: INTERNAL MEDICINE
Payer: COMMERCIAL

## 2017-11-21 ENCOUNTER — GENERIC CONVERSION - ENCOUNTER (OUTPATIENT)
Dept: GASTROENTEROLOGY | Facility: CLINIC | Age: 57
End: 2017-11-21

## 2017-11-21 VITALS
DIASTOLIC BLOOD PRESSURE: 89 MMHG | TEMPERATURE: 96.8 F | BODY MASS INDEX: 23.49 KG/M2 | RESPIRATION RATE: 18 BRPM | SYSTOLIC BLOOD PRESSURE: 136 MMHG | HEIGHT: 68 IN | WEIGHT: 155 LBS | HEART RATE: 62 BPM | OXYGEN SATURATION: 100 %

## 2017-11-21 DIAGNOSIS — K92.1 BLOOD IN STOOL: ICD-10-CM

## 2017-11-21 DIAGNOSIS — K21.9 GERD (GASTROESOPHAGEAL REFLUX DISEASE): ICD-10-CM

## 2017-11-21 DIAGNOSIS — D64.9 ANEMIA: ICD-10-CM

## 2017-11-21 PROCEDURE — 88305 TISSUE EXAM BY PATHOLOGIST: CPT | Performed by: INTERNAL MEDICINE

## 2017-11-21 RX ORDER — SODIUM CHLORIDE 9 MG/ML
100 INJECTION, SOLUTION INTRAVENOUS CONTINUOUS
Status: DISCONTINUED | OUTPATIENT
Start: 2017-11-21 | End: 2017-11-21 | Stop reason: HOSPADM

## 2017-11-21 RX ORDER — PROPOFOL 10 MG/ML
INJECTION, EMULSION INTRAVENOUS AS NEEDED
Status: DISCONTINUED | OUTPATIENT
Start: 2017-11-21 | End: 2017-11-21 | Stop reason: SURG

## 2017-11-21 RX ADMIN — PROPOFOL 50 MG: 10 INJECTION, EMULSION INTRAVENOUS at 09:36

## 2017-11-21 RX ADMIN — PROPOFOL 80 MG: 10 INJECTION, EMULSION INTRAVENOUS at 09:16

## 2017-11-21 RX ADMIN — SODIUM CHLORIDE: 0.9 INJECTION, SOLUTION INTRAVENOUS at 08:49

## 2017-11-21 RX ADMIN — PROPOFOL 50 MG: 10 INJECTION, EMULSION INTRAVENOUS at 09:39

## 2017-11-21 RX ADMIN — PROPOFOL 50 MG: 10 INJECTION, EMULSION INTRAVENOUS at 09:48

## 2017-11-21 RX ADMIN — PROPOFOL 50 MG: 10 INJECTION, EMULSION INTRAVENOUS at 09:21

## 2017-11-21 RX ADMIN — PROPOFOL 50 MG: 10 INJECTION, EMULSION INTRAVENOUS at 09:18

## 2017-11-21 RX ADMIN — PROPOFOL 30 MG: 10 INJECTION, EMULSION INTRAVENOUS at 09:24

## 2017-11-21 RX ADMIN — PROPOFOL 50 MG: 10 INJECTION, EMULSION INTRAVENOUS at 09:33

## 2017-11-21 RX ADMIN — PROPOFOL 50 MG: 10 INJECTION, EMULSION INTRAVENOUS at 09:28

## 2017-11-21 RX ADMIN — PROPOFOL 50 MG: 10 INJECTION, EMULSION INTRAVENOUS at 09:44

## 2017-11-21 NOTE — OP NOTE
ESOPHAGOGASTRODUODENOSCOPY    PROCEDURE: EGD    INDICATIONS: Iron Deficiency Anaemia    POST-OP DIAGNOSIS: See the impression below    SEDATION: Monitored anesthesia care, check anesthesia records    PHYSICAL EXAM:    Vitals:    11/21/17 0838   BP: 132/74   Pulse: 68   Resp: 18   Temp: (!) 97 4 °F (36 3 °C)   SpO2: 100%    Body mass index is 23 57 kg/m²  General: NAD  Heart: S1 & S2 normal, RRR  Lungs: CTA, No rales or rhonchi  Abdomen: Soft, nontender, nondistended, good bowel sounds    CONSENT:  Informed consent was obtained for the procedure, including sedation after explaining the risks and benefits of the procedure  Risks including but not limited to bleeding, perforation, infection, aspiration were discussed in detail  Also explained about less than 100% sensitivity with the exam and other alternatives  PREPARATION:   EKG tracing, pulse oximetry, blood pressure were monitored throughout the procedure  Patient was identified by myself both verbally and by visual inspection of ID band  DESCRIPTION:   Patient was placed in the left lateral decubitus position and was sedated with the above medication  The gastroscope was introduced in to the oropharynx and the esophagus was intubated under direct visualization  Scope was passed down the esophagus up to 2nd part of the duodenum  A careful inspection was made as the gastroscope was withdrawn, including a retroflexed view of the stomach; findings and interventions are described below  FINDINGS:    #1  Esophagus and GEJ-normal appearing esophageal mucosa noted, squamocolumnar junction appeared regular at 38 cm  #2  Stomach-4 cm of gastric remnant noted starting from 38-42 cm  Mucosa appeared unremarkable  Normal gastric bypass anatomy noted with staples within the gastric remnant  There were no ulcers, or AVMs noted      #3  Duodenum-small intestine was examined beyond the Miriam limb, and mucosa appeared unremarkable in both afferent and efferent limbs   There were no AVMs or erosions seen  IMPRESSIONS:      1  Normal appearing gastric bypass anatomy noted  Mucosa appeared unremarkable within the gastric remnant and in the efferent limb, afferent limb was identified with bile and appeared unremarkable  RECOMMENDATIONS:     1   I suspect iron deficiency anemia is likely secondary to malabsorption, would recommend to continue iron infusions  2   Will proceed with colonoscopy to rule out colonic source of anemia  COMPLICATIONS:  None; patient tolerated the procedure well            DISPOSITION: PACU           CONDITION: Stable

## 2017-11-21 NOTE — OP NOTE
side   2   6-7 mm, sessile polyp noted in the ascending colon, removed using cold biopsy forceps  3   Retroflexion was performed and revealed small internal hemorrhoids  4   Remainder of the colonic mucosa appeared unremarkable  IMPRESSIONS:      1  Ascending colon polyp, removed using biopsy forceps  2   Small internal hemorrhoids  RECOMMENDATIONS:    1  If the polyp removed is hyperplastic, recommend repeat colonoscopy in 5 years, if adenomatous, in 3 years  2   High-fiber diet  3   Given no etiology of iron deficiency anemia identified on EGD or colonoscopy, would recommend PillCam for small-bowel evaluation  COMPLICATIONS:  None; patient tolerated the procedure well      DISPOSITION: PACU           CONDITION: Stable

## 2017-11-21 NOTE — ANESTHESIA POSTPROCEDURE EVALUATION
Post-Op Assessment Note      CV Status:  Stable    Hydration Status:  Stable    PONV Controlled:  None    Airway Patency:  Patent    Post Op Vitals Reviewed: Yes          Staff: CRNA           BP 96/61 (11/21/17 0951)    Temp     Pulse 68 (11/21/17 0951)   Resp 15 (11/21/17 0951)    SpO2 99 % (11/21/17 0951)

## 2017-11-21 NOTE — H&P
History and Physical - [facility]  Chandrakant Gonzalez 65 y o  female MRN: [id number]    HPI: Chandrakant Gonzalez is a 65y o  year old female who presents for evaluation of iron-deficiency anemia  Review of Systems    Historical Information   Past Medical History:   Diagnosis Date    Anxiety     Cancer ([address] )     thyroid    Iron deficiency anemia      Past Surgical History:   Procedure Laterality Date    APPENDECTOMY      CATARACT EXTRACTION Left     lens implant    CHOLECYSTECTOMY      GASTRIC BYPASS      HYSTERECTOMY      JOINT REPLACEMENT      KNEE ARTHROSCOPY Bilateral     [place] FEMORAL FX, OPEN TX Right 10/10/2016    Procedure: FEMORAL NECK FIXATION ;  Surgeon: Leslye Thomas MD;  Location: [facility];  Service: Orthopedics    THYROIDECTOMY Bilateral      Social History   History   Alcohol Use    Yes     Comment: moderate     History   Drug Use No     History   Smoking Status    Never Smoker   Smokeless Tobacco    Never Used     History reviewed  No pertinent family history  Meds/Allergies     Prescriptions Prior to Admission   Medication    ALPRAZolam (XANAX) 0 5 mg tablet    Calcium Carbonate (CALCIUM 600) 1500 (600 CA) MG TABS    docusate sodium (COLACE) 100 mg capsule    levothyroxine (SYNTHROID) 112 mcg tablet    loratadine (CLARITIN) 10 mg tablet    ondansetron (ZOFRAN) 4 mg tablet    Vitamin D, Cholecalciferol, 1000 UNITS TABS       Allergies   Allergen Reactions    Sulfa Antibiotics Hives       Objective     Blood pressure 132/74, pulse 68, temperature (!) 97 4 °F (36 3 °C), temperature source Temporal, resp  rate 18, height 5' 8" (1 727 m), weight 70 3 kg (155 lb), SpO2 100 %  PHYSICAL EXAM    Gen: NAD  CV: RRR  CHEST: Clear  ABD: soft, NT/ND  EXT: no edema  Neuro: AAO      ASSESSMENT/PLAN:  This is a 65y o  year old female here for evaluation of iron deficiency anemia  PLAN:   Procedure:  EGD and colonoscopy

## 2017-11-21 NOTE — ANESTHESIA PREPROCEDURE EVALUATION
Review of Systems/Medical History  Patient summary reviewed  Chart reviewed  No history of anesthetic complications     Cardiovascular  Negative cardio ROS Exercise tolerance: good,    Comment: Hx PVCs/PACs,  Pulmonary  Negative pulmonary ROS No sleep apnea , ,        GI/Hepatic    GERD , Bowel prep       Negative  ROS        Endo/Other  History of thyroid disease (hx thyroid CA) , hypothyroidism,      GYN    Hysterectomy,        Hematology  Anemia ,     Musculoskeletal  Negative musculoskeletal ROS        Neurology  Negative neurology ROS      Psychology   Anxiety,          Physical Exam    Airway    Mallampati score: II  TM Distance: >3 FB  Neck ROM: full     Dental   No notable dental hx     Cardiovascular  Comment: Negative ROS,     Pulmonary      Other Findings      Lab Results   Component Value Date    WBC 4 89 11/11/2017    HGB 9 6 (L) 11/11/2017     11/11/2017     Lab Results   Component Value Date     10/20/2017    K 4 7 10/20/2017    BUN 7 10/20/2017    CREATININE 0 68 10/20/2017    GLUCOSE 91 10/07/2016     Lab Results   Component Value Date    HGBA1C 4 9 10/20/2017     Anesthesia Plan  ASA Score- 2       Anesthesia Type- IV sedation with anesthesia with ASA Monitors  Additional Monitors:   Airway Plan:           Induction- intravenous  Informed Consent- Anesthetic plan and risks discussed with patient  I personally reviewed this patient with the CRNA  Discussed and agreed on the Anesthesia Plan with the CRNA  Charley Henriquez

## 2017-11-24 ENCOUNTER — GENERIC CONVERSION - ENCOUNTER (OUTPATIENT)
Dept: OTHER | Facility: OTHER | Age: 57
End: 2017-11-24

## 2017-11-28 DIAGNOSIS — D64.9 ANEMIA: ICD-10-CM

## 2017-11-28 RX ORDER — SODIUM CHLORIDE 9 MG/ML
20 INJECTION, SOLUTION INTRAVENOUS ONCE
Status: COMPLETED | OUTPATIENT
Start: 2017-11-29 | End: 2017-11-29

## 2017-11-29 ENCOUNTER — HOSPITAL ENCOUNTER (OUTPATIENT)
Dept: INFUSION CENTER | Facility: HOSPITAL | Age: 57
Discharge: HOME | End: 2017-11-29
Payer: COMMERCIAL

## 2017-11-29 VITALS
HEART RATE: 73 BPM | TEMPERATURE: 96.3 F | RESPIRATION RATE: 18 BRPM | DIASTOLIC BLOOD PRESSURE: 83 MMHG | SYSTOLIC BLOOD PRESSURE: 146 MMHG

## 2017-11-29 LAB
BASOPHILS # BLD AUTO: 0.01 THOUSANDS/ΜL (ref 0–0.1)
BASOPHILS NFR BLD AUTO: 0 % (ref 0–1)
EOSINOPHIL # BLD AUTO: 0.04 THOUSAND/ΜL (ref 0–0.61)
EOSINOPHIL NFR BLD AUTO: 1 % (ref 0–6)
ERYTHROCYTE [DISTWIDTH] IN BLOOD BY AUTOMATED COUNT: 24.5 % (ref 11.6–15.1)
HCT VFR BLD AUTO: 36.4 % (ref 34.8–46.1)
HGB BLD-MCNC: 11.3 G/DL (ref 11.5–15.4)
IRON SERPL-MCNC: 748 UG/DL (ref 50–170)
LYMPHOCYTES # BLD AUTO: 2.36 THOUSANDS/ΜL (ref 0.6–4.47)
LYMPHOCYTES NFR BLD AUTO: 41 % (ref 14–44)
MCH RBC QN AUTO: 25 PG (ref 26.8–34.3)
MCHC RBC AUTO-ENTMCNC: 31 G/DL (ref 31.4–37.4)
MCV RBC AUTO: 81 FL (ref 82–98)
MONOCYTES # BLD AUTO: 0.26 THOUSAND/ΜL (ref 0.17–1.22)
MONOCYTES NFR BLD AUTO: 5 % (ref 4–12)
NEUTROPHILS # BLD AUTO: 3.09 THOUSANDS/ΜL (ref 1.85–7.62)
NEUTS SEG NFR BLD AUTO: 53 % (ref 43–75)
NRBC BLD AUTO-RTO: 0 /100 WBCS
PLATELET # BLD AUTO: 225 THOUSANDS/UL (ref 149–390)
PMV BLD AUTO: 8.6 FL (ref 8.9–12.7)
RBC # BLD AUTO: 4.52 MILLION/UL (ref 3.81–5.12)
WBC # BLD AUTO: 5.78 THOUSAND/UL (ref 4.31–10.16)

## 2017-11-29 PROCEDURE — 96366 THER/PROPH/DIAG IV INF ADDON: CPT

## 2017-11-29 PROCEDURE — 96365 THER/PROPH/DIAG IV INF INIT: CPT

## 2017-11-29 PROCEDURE — 83540 ASSAY OF IRON: CPT | Performed by: FAMILY MEDICINE

## 2017-11-29 PROCEDURE — 85025 COMPLETE CBC W/AUTO DIFF WBC: CPT | Performed by: FAMILY MEDICINE

## 2017-11-29 PROCEDURE — 96367 TX/PROPH/DG ADDL SEQ IV INF: CPT

## 2017-11-29 RX ADMIN — IRON SUCROSE 300 MG: 20 INJECTION, SOLUTION INTRAVENOUS at 12:26

## 2017-11-29 RX ADMIN — ONDANSETRON 4 MG: 2 INJECTION INTRAMUSCULAR; INTRAVENOUS at 11:33

## 2017-11-29 RX ADMIN — SODIUM CHLORIDE 20 ML/HR: 0.9 INJECTION, SOLUTION INTRAVENOUS at 11:33

## 2017-11-29 NOTE — PROGRESS NOTES
Peripheral labs drawn  Pt received treatment without complications  Discharged in stable condition   Declined AVS

## 2017-11-29 NOTE — PLAN OF CARE
Problem: Potential for Falls  Goal: Patient will remain free of falls  INTERVENTIONS:  - Assess patient frequently for physical needs  -  Identify cognitive and physical deficits and behaviors that affect risk of falls    -  [place] fall precautions as indicated by assessment   - Educate patient/family on patient safety including physical limitations  - Instruct patient to call for assistance with activity based on assessment  - Modify environment to reduce risk of injury  - Consider OT/PT consult to assist with strengthening/mobility   Outcome: Progressing

## 2017-11-30 ENCOUNTER — GENERIC CONVERSION - ENCOUNTER (OUTPATIENT)
Dept: OTHER | Facility: OTHER | Age: 57
End: 2017-11-30

## 2017-12-04 ENCOUNTER — GENERIC CONVERSION - ENCOUNTER (OUTPATIENT)
Dept: OTHER | Facility: OTHER | Age: 57
End: 2017-12-04

## 2017-12-07 ENCOUNTER — GENERIC CONVERSION - ENCOUNTER (OUTPATIENT)
Dept: OTHER | Facility: OTHER | Age: 57
End: 2017-12-07

## 2017-12-12 DIAGNOSIS — C73 MALIGNANT NEOPLASM OF THYROID GLAND (HCC): ICD-10-CM

## 2018-01-10 NOTE — RESULT NOTES
Verified Results  (1) OCCULT BLOOD, FECAL IMMUNOCHEMICAL TEST [id number] 01:48PM Francis Segovia Order Number: [id number]     Test Name Result Flag Reference   OCCULT BLD, FECAL IMMUNOLOGICAL Positive A Negative   Performed by Fecal Immunochemical Test

## 2018-01-10 NOTE — PROGRESS NOTES
Assessment    1  Well adult on routine health check (V70 0) (Z00 00)   2  Postgastrectomy malabsorption (579 3) (K91 2,Z90 3)   3  Special screening for malignant neoplasms, colon (V76 51) (Z12 11)   4  Need for prophylactic vaccination and inoculation against influenza (V04 81) (Z23)    Plan  Anemia, Lipid screening, Postgastrectomy malabsorption, Vitamin D deficiency    · (1) CBC/PLT/DIFF; Status:Active; Requested for:17Oct2017;    · (1) COMPREHENSIVE METABOLIC PANEL; Status:Active; Requested NTZ:80TKS7466;    · (1) FERRITIN; Status:Active; Requested for:17Oct2017;    · (1) HEMOGLOBIN A1C; Status:Active; Requested BEF:00BMN5630;    · (1) IRON; Status:Active; Requested for:17Oct2017;    · (1) LIPID PANEL FASTING W DIRECT LDL REFLEX; Status:Active; Requested  for:17Oct2017;    · (1) TSH WITH FT4 REFLEX; Status:Active; Requested for:17Oct2017;    · (1) VITAMIN B1, WHOLE BLOOD; Status:Active; Requested for:17Oct2017;    · (1) VITAMIN B12; Status:Active; Requested OSZ:34HSA2611;    · (1) VITAMIN B6; Status:Active; Requested for:17Oct2017;    · (1) VITAMIN D 25-HYDROXY; Status:Active; Requested CFW:54KMJ8817; Anxiety    · ALPRAZolam 0 5 MG Oral Tablet (Xanax); take 1 tablet every twelve hours  Need for prophylactic vaccination and inoculation against influenza    · Fluzone Quadrivalent 0 5 ML Intramuscular Suspension Prefilled Syringe  Screening for depression    · *VB - PHQ-9 Tool; Status:Complete - Retrospective By Protocol Authorization;   Done:  55PFL6798 04:03PM  Special screening for malignant neoplasms, colon    · (1) OCCULT BLOOD, FECAL IMMUNOCHEMICAL TEST; Status:Active; Requested  WEV:90GKS2164;    Well adult on routine health check    · Brush your teeth {freq1} and floss at least once a day ; Status:Complete;   Done:  52YVM0533   · Eat a low fat and low cholesterol diet ; Status:Complete;   Done: 45UJI4189   · Use a sun block product with an SPF of 15 or more ; Status:Complete;   Done:  84ZAS1659   · We recommend routine visits to a dentist ; Status:Complete;   Done: 54XWD5345   · Call [phone] if: You find a new or different kind of lump in your breast ;  Status:Complete;   Done: 68BRM0956   · Call [phone] if: You have any bleeding from the vagina ; Status:Complete;    Done: 91APB9399   · Call [phone] if: You have any warning signs of skin cancer ; Status:Complete;    Done: 58RXJ2713   · Call 911 if: You experience a new kind of chest pain (angina) or pressure ;  Status:Complete;   Done: 40GUG0653   · Follow-up visit in 1 year Evaluation and Treatment  Follow-up  Status: Complete  Done:  49WQN8138    Discussion/Summary  health maintenance visit Currently, she eats a healthy diet and has an adequate exercise regimen  cervical cancer screening is not indicated Breast cancer screening: mammogram is current  Colorectal cancer screening: colonoscopy has been ordered  Osteoporosis screening: bone mineral density testing is current  The risks and benefits of immunizations were discussed  Advice and education were given regarding aerobic exercise  Chief Complaint  Patient here for annual wellness exam      History of Present Illness  HM, Adult Female: The patient is being seen for a health maintenance evaluation  General Health: The patient's health since the last visit is described as good  She has regular dental visits  She denies vision problems  She denies hearing loss  Immunizations status: up to date  Lifestyle:  She consumes a diverse and healthy diet  She does not have any weight concerns  She exercises regularly  She does not use tobacco  She denies alcohol use  She denies drug use  Screening:   HPI: here for wellness  working in urgent care      Review of Systems    Constitutional: feeling tired, but as noted in HPI  Eyes: No complaints of eye pain, no red eyes, no eyesight problems, no discharge, no dry eyes, no itching of eyes     ENT: no complaints of earache, no loss of hearing, no nose bleeds, no nasal discharge, no sore throat, no hoarseness  Cardiovascular: No complaints of slow heart rate, no fast heart rate, no chest pain, no palpitations, no leg claudication, no lower extremity edema  Respiratory: No complaints of shortness of breath, no wheezing, no cough, no SOB on exertion, no orthopnea, no PND  Gastrointestinal: No complaints of abdominal pain, no constipation, no nausea or vomiting, no diarrhea, no bloody stools  Genitourinary: No complaints of dysuria, no incontinence, no pelvic pain, no dysmenorrhea, no vaginal discharge or bleeding  Musculoskeletal: hip pain, but as noted in HPI  Integumentary: No complaints of skin rash or lesions, no itching, no skin wounds, no breast pain or lump  Neurological: No complaints of headache, no confusion, no convulsions, no numbness, no dizziness or fainting, no tingling, no limb weakness, no difficulty walking  Psychiatric: Not suicidal, no sleep disturbance, no anxiety or depression, no change in personality, no emotional problems  Endocrine: No complaints of proptosis, no hot flashes, no muscle weakness, no deepening of the voice, no feelings of weakness  Hematologic/Lymphatic: No complaints of swollen glands, no swollen glands in the neck, does not bleed easily, does not bruise easily  Over the past 2 weeks, how often have you been bothered by the following problems? 1 ) Little interest or pleasure in doing things? Several days  2 ) Feeling down, depressed or hopeless? Several days  3 ) Trouble falling asleep or sleeping too much? Half the days or more  4 ) Feeling tired or having little energy? Several days  5 ) Poor appetite or overeating? Several days  6 ) Feeling bad about yourself, or that you are a failure, or have let yourself or your family down? Not at all    7 ) Trouble concentrating on things, such as reading a newspaper or watching television?  Not at all    8 ) Moving or speaking so slowly that other people could have noticed, or the opposite, moving or speaking faster than usual? Not at all  severity of depression is mild   How difficult have these problems made it for you to do your work, take care of things at home, or get along with people? Not at all  Score 6      Active Problems    1  Anemia (285 9) (D64 9)   2  Anxiety (300 00) (F41 9)   3  Dupuytren's contracture (728 6) (M72 0)   4  Encounter for routine gynecological examination (V72 31) (Z01 419)   5  Encounter for screening mammogram for malignant neoplasm of breast (V76 12)   (Z12 31)   6  Hip pain (719 45) (M25 559)   7  Lipid screening (V77 91) (Z13 220)   8  Osteoporosis (733 00) (M81 0)   9  Papillary carcinoma of thyroid (193) (C73)   10  Pernicious anemia (281 0) (D51 0)   11  Postgastrectomy malabsorption (579 3) (K91 2,Z90 3)   12  Postsurgical hypothyroidism (244 0) (E89 0)   13  Primary osteoarthritis of left knee (715 16) (M17 12)   14  Screen for STD (sexually transmitted disease) (V74 5) (Z11 3)   15  Seasonal allergies (477 9) (J30 2)   16  Special screening for malignant neoplasms, colon (V76 51) (Z12 11)   17  Status post gastric bypass for obesity (V45 86) (Z98 84)   18  Vitamin D deficiency (268 9) (E55 9)   19   Well adult on routine health check (V70 0) (Z00 00)    Past Medical History    · History of Closed Rib Fracture (807 00)   · History of fall (V15 88) (Z91 81)   · History of hysterectomy (V88 01) (Z90 710)   · History of malignant neoplasm of thyroid (V10 87) (Z85 850)   · History of viral gastroenteritis (V12 09) (Z86 19)    Surgical History    · History of Appendectomy (47 0)   · History of Breast Surgery   · History of  Section   · History of Cholecystectomy   · History of Gastric Surgery For Morbid Obesity Gastric Bypass   · History of Hip Surgery Right   · History of Thyroid Surgery   · History of Total Abdominal Hysterectomy    Family History  Mother    · Family history of cardiac disorder (V17 49) (Z82 49)   · Family history of diabetes mellitus (V18 0) (Z83 3)  Father    · Family history of Cancer of mouth  Maternal Grandfather    · Family history of Bone cancer   · Family history of malignant neoplasm of brain (V16 8) (Z80 8)   · Family history of Lung Cancer (V16 1)   · Family history of Skin Cancer (V16 8)  Paternal Aunt    · Family history of lung cancer (V16 1) (Z80 1)  Maternal Cousin    · Family history of breast cancer (V16 3) (Z80 3)   · Family history of Melanoma  Family History    · Family history of Coronary Artery Disease (V17 49)   · Family history of Osteoporosis (V17 81)   · Family history of Rheumatoid Arthritis    Social History    · Being A Social Drinker   · Caffeine Use   · Daily Coffee Consumption (4  Cups/Day)   · Denied: History of Drug use   · Exercises regularly   · Never A Smoker    Current Meds   1  ALPRAZolam 0 5 MG Oral Tablet; take 1 tablet every twelve hours; Last WV:21FDK0557   Ordered   2  BD Pen Needle Sarah U/F 32G X 4 MM Miscellaneous; Use 1 per day with forteo    Requested for: 71PHL4861; Last Rx:15May2017 Ordered   3  Calcium 500 MG TABS; TAKE 1 TABLET DAILY; Therapy: 53UZI3762 to Recorded   4  Claritin 10 MG Oral Tablet; Therapy: (Recorded:16Xsk2672) to Recorded   5  Forteo 600 MCG/2 4ML Subcutaneous Solution; INJECT 20 MCG  SUBCUTANEOUSLY   ONCE DAILY AS DIRECTED; Therapy: 01BQR8692 to (Evaluate:05Vry6177)  Requested for: 04NLS9876; Last   Rx:15May2017 Ordered   6  Yissel Root CAPS; Therapy: (Recorded:32Sxr3125) to Recorded   7  Melatonin 10 MG Oral Capsule; Therapy: (Recorded:94Rwn2681) to Recorded   8  Synthroid 112 MCG Oral Tablet; TAKE 1 TABLET DAILY mon-sat and  2 tablets on   sunday; Therapy: 93WQG5300 to (Evaluate:24Jun2018)  Requested for: 49OXV7679; Last   Rx:29Jun2017 Ordered   9  Vitamin B12 TABS; Therapy: (Recorded:88Ygt2230) to Recorded   10  Vitamin D-3 5000 UNIT TABS; Therapy: (Recorded:39Xyf2526) to Recorded    Allergies    1  Bactrim TABS   2  Scopolamine HBr SOLN   3  Sulfa Drugs    4  Adhesive Tape   5  No Known Environmental Allergies   6  Seasonal    Vitals   Recorded: 42MVH4104 03:17PM   Heart Rate 84   Respiration 18   Systolic 728   Diastolic 70   Height 5 ft 7 in   Weight 160 lb 4 oz   BMI Calculated 25 1   BSA Calculated 1 84     Physical Exam    Constitutional   General appearance: No acute distress, well appearing and well nourished  Head and Face   Head and face: Normal     Eyes   Conjunctiva and lids: No swelling, erythema or discharge  Pupils and irises: Equal, round, reactive to light  Ears, Nose, Mouth, and Throat   External inspection of ears and nose: Normal     Otoscopic examination: Tympanic membranes translucent with normal light reflex  Canals patent without erythema  Hearing: Normal     Nasal mucosa, septum, and turbinates: Normal without edema or erythema  Lips, teeth, and gums: Normal, good dentition  Oropharynx: Normal with no erythema, edema, exudate or lesions  Neck   Neck: Supple, symmetric, trachea midline, no masses  Thyroid: Normal, no thyromegaly  Pulmonary   Respiratory effort: No increased work of breathing or signs of respiratory distress  Auscultation of lungs: Clear to auscultation  Cardiovascular   Auscultation of heart: Normal rate and rhythm, normal S1 and S2, no murmurs  Examination of extremities for edema and/or varicosities: Normal     Abdomen   Abdomen: Non-tender, no masses  Liver and spleen: No hepatomegaly or splenomegaly  Lymphatic   Palpation of lymph nodes in neck: No lymphadenopathy  Palpation of lymph nodes in axillae: No lymphadenopathy  Musculoskeletal   Gait and station: Normal     Digits and nails: Normal without clubbing or cyanosis  Joints, bones, and muscles: Normal     Range of motion: Normal     Stability: Normal     Muscle strength/tone: Normal     Skin   Skin and subcutaneous tissue: Normal without rashes or lesions  Palpation of skin and subcutaneous tissue: Normal turgor  Neurologic   Cranial nerves: Cranial nerves II-XII intact  Cortical function: Normal mental status  Reflexes: 2+ and symmetric  Sensation: No sensory loss  Coordination: Normal finger to nose and heel to shin  Psychiatric   Judgment and insight: Normal     Orientation to person, place, and time: Normal     Recent and remote memory: Intact  Mood and affect: Normal        Results/Data  *VB - PHQ-9 Tool 62PFF6072 04:03PM Oksana Yeboah     Test Name Result Flag Reference   PHQ-9 Adult Depression Score 6     PHQ-9 Adult Depression Screening Positive         Health Management  Encounter for routine gynecological examination   BREAST EXAM; every 1 year; Last 74BFN1731; Next Due: 99Mtb5903; Active  PELVIC EXAM; every 1 year; Last 44XFC8459; Next Due: 26Hhg2557;  Active    Future Appointments    Date/Time Provider Specialty Site   10/18/2018 09:15 AM Oksana Yeboah DO Family Medicine [address] [facility]   10/31/2017 01:30 PM Corrinne Bonier, [facility]     Signatures   Electronically signed by : Tony Chavez DO; Oct 17 2017  4:17PM EST                       (Author)

## 2018-01-10 NOTE — RESULT NOTES
Message   ok, continue synthroid at current dose      Verified Results  (1) TSH 07Oct2016 10:17AM Shiraz Mika    Order Number: [id number]  TW Order Number: [id number]     Test Name Result Flag Reference   TSH 0 502 uIU/mL  0 358-3 740   - Patient Instructions: This bloodwork is non-fasting  Please drink two glasses of water morning of bloodwork  - Patient Instructions: This bloodwork is non-fasting  Please drink two glasses of water morning of bloodwork  - Patient Instructions: This bloodwork is non-fasting  Please drink two glasses of water morning of bloodwork  - Patient Instructions: This bloodwork is non-fasting  Please drink two glasses of water morning of bloodwork  Patients undergoing fluorescein dye angiography may retain small amounts of fluorescein in the body for 48-72 hours post procedure  Samples containing fluorescein can produce falsely depressed TSH values  If the patient had this procedure,a specimen should be resubmitted post fluorescein clearance            The recommended reference ranges for TSH during pregnancy are as follows:  First trimester 0 1 to 2 5 uIU/mL  Second trimester  0 2 to 3 0 uIU/mL  Third trimester 0 3 to 3 0 uIU/m     (1) T4, FREE 07Oct2016 10:17AM Shiraz Brennan    Order Number: [id number]  TW Order Number: [id number]     Test Name Result Flag Reference   T4,FREE 1 23 ng/dL  0 76-1 46

## 2018-01-11 NOTE — RESULT NOTES
Message   spoke with pt by phone  Thyroglobulin Ab Stable, Thyroglobulin undetectable, will continue to monitor  NO thyrogen stimulated needed  has u/s next week  rx for synthroid sent to pharmacy     Verified Results  (1) COMPREHENSIVE METABOLIC PANEL 44PPN0882 64:31KT Anthony Abebe Order Number: [id number]      National Kidney Disease Education Program recommendations are as follows:  GFR calculation is accurate only with a steady state creatinine  Chronic Kidney disease less than 60 ml/min/1 73 sq  meters  Kidney failure less than 15 ml/min/1 73 sq  meters  Test Name Result Flag Reference   GLUCOSE,RANDM 89 mg/dL     If the patient is fasting, the ADA then defines impaired fasting glucose as > 100 mg/dL and diabetes as > or equal to 123 mg/dL  SODIUM 140 mmol/L  136-145   POTASSIUM 4 2 mmol/L  3 5-5 3   CHLORIDE 106 mmol/L  100-108   CARBON DIOXIDE 26 mmol/L  21-32   ANION GAP (CALC) 8 mmol/L  4-13   BLOOD UREA NITROGEN 13 mg/dL  5-25   CREATININE 0 90 mg/dL  0 60-1 30   Standardized to IDMS reference method   CALCIUM 8 6 mg/dL  8 3-10 1   BILI, TOTAL 0 36 mg/dL  0 20-1 00   ALK PHOSPHATAS 76 U/L     ALT (SGPT) 19 U/L  12-78   AST(SGOT) 27 U/L  5-45   ALBUMIN 3 6 g/dL  3 5-5 0   TOTAL PROTEIN 7 1 g/dL  6 4-8 2   eGFR Non-African American      >60 0 ml/min/1 73sq m     (1) VITAMIN D 25-HYDROXY 08Dtp0514 02:50PM Anthony Abebe Order Number: [id number]     Order Number: [id number]     Test Name Result Flag Reference   VIT D 25-HYDROX 34 7 ng/mL  30 0-100 0     (1) TSH 37Taq4980 02:50PM Anthony Andrae Order Number: [id number]    Patients undergoing fluorescein dye angiography may retain small amounts of fluorescein in the body for 48-72 hours post procedure  Samples containing fluorescein can produce falsely depressed TSH values  If the patient had this procedure,a specimen should be resubmitted post fluorescein clearance          The recommended reference ranges for TSH during pregnancy are as follows:  First trimester 0 1 to 2 5 uIU/mL  Second trimester  0 2 to 3 0 uIU/mL  Third trimester 0 3 to 3 0 uIU/m     Test Name Result Flag Reference   TSH 0 461 uIU/mL  0 358-3 740     (1) T4, FREE 40Lxn0407 02:50PM Baudilio Crew Order Number: [id number]     Test Name Result Flag Reference   T4,FREE 1 31 ng/dL  0 76-1 46     (1) THYROGLOBULIN/QUANT W/ANTIBODY PANEL 35Gax3166 02:50PM Baudilio Crew Order Number: [id number]    Performed at:  [address]  [facility]  [address] [place] [address]  [de-identified]  : Zechariah Coleman MD, Phone:  [phone]     Test Name Result Flag Reference   THYROGLOB AB 3 2 IU/mL H 0 0 - 0 9   Thyroglobulin Antibody measured by [facility]   THYROGLOBULIN (TG-KEV) <2 0 ng/mL     Reference Range:Pubertal Childrenand Adults: <40According to the [employer] of Clinical Biochemistry,the reference interval for Thyroglobulin (TG) should berelated to euthyroid patients and not for patients whounderwent thyroidectomy  TG reference intervals for thesepatients depend on the residual mass of the thyroid tissueleft after surgery  Establishing a post-operative baselineis recommended  The assay quantitation limit is 2 0 ng/mL         Plan  Postsurgical hypothyroidism    · Synthroid 112 MCG Oral Tablet (Levothyroxine Sodium); TAKE 1 TABLET DAILY  mon-sat and  2 tablets on sunday    Signatures   Electronically signed by : RUPERT Du; Mar  1 2016 12:14PM EST                       (Author)

## 2018-01-11 NOTE — RESULT NOTES
Message   thyroglobulin antibodies slightly lower -continue to monitor     Verified Results  (1) THYROGLOBULIN/QUANT W/ANTIBODY PANEL 83Klc9409 04:12PM Kelli Navarro Order Number: [id number]     Test Name Result Flag Reference   THYROGLOB AB 2 4 IU/mL H 0 0 - 0 9   Thyroglobulin Antibody measured by [facility]    Performed at:  [address]  [id number]  : Sharon Salazar MD, Phone:  [phone]   THYROGLOBULIN (TG-KEV) <2 0 ng/mL     Reference Range:  Pubertal Children  and Adults: <40  According to the [facility] of Clinical Biochemistry,  the reference interval for Thyroglobulin (TG) should be  related to euthyroid patients and not for patients who  underwent thyroidectomy  TG reference intervals for these  patients depend on the residual mass of the thyroid tissue  left after surgery  Establishing a post-operative baseline  is recommended  The assay quantitation limit is 2 0 ng/mL      Performed at:  [address] [facility]  [address] [place] [address]  [de-identified]  : Gerhardt Bowers MD, Phone:  [phone]

## 2018-01-11 NOTE — PROGRESS NOTES
Assessment    1  Well adult on routine health check (V70 0) (Z00 00)   2  Preop examination (V72 84) (Z01 818)   3  Cataract (366 9) (H26 9)    Plan  Cataract, Preop examination    · EKG/ECG- POC; Status:Complete;   Done: 54EJX3444 02:18PM  Well adult on routine health check    · Brush your teeth freq1 and floss at least once a day ; Status:Complete;   Done:  89UOT7473   · Eat a low fat and low cholesterol diet ; Status:Complete;   Done: 29EOD3476   · Use a sun block product with an SPF of 15 or more ; Status:Complete;   Done:  88TTC1899   · We recommend routine visits to a dentist ; Status:Complete;   Done: 54NDV0416   · Call [phone] if: You find a new or different kind of lump in your breast ;  Status:Complete;   Done: 63IWH2498   · Call [phone] if: You have any warning signs of skin cancer ; Status:Complete;    Done: 49MNM9444   · Call [phone] if: You experience a new kind of chest pain (angina) or pressure ;  Status:Complete;   Done: 12GKA2658   · Follow-up visit in 1 year Evaluation and Treatment  Follow-up  Status: Complete -  Scheduling  Done: 22DJN5648 02:31PM    Discussion/Summary  health maintenance visit Currently, she eats a healthy diet and has an adequate exercise regimen  cervical cancer screening is not indicated Breast cancer screening: mammogram is current  Colorectal cancer screening: colonoscopy has been ordered  The immunizations are up to date  She was advised to be evaluated by an ophthalmologist  Advice and education were given regarding aerobic exercise  Patient discussion: discussed with the patient  PRECLEARANCE- LOW RISK, CLEARED FOR CATARACT SURGERY  Chief Complaint  Patient here for Annual Wellness exam      History of Present Illness  HM, Adult Female: The patient is being seen for a health maintenance evaluation  General Health: The patient's health since the last visit is described as fair  She has regular dental visits  She denies vision problems   She denies hearing loss  Immunizations status: up to date  Lifestyle:  She consumes a diverse and healthy diet  She does not have any weight concerns  She exercises regularly  She does not use tobacco  She denies alcohol use  She denies drug use  Reproductive health: the patient is postmenopausal    Screening: cancer screening reviewed and updated  Cervical cancer screening includes uncertain timing of her last pap smear  Breast cancer screening includes a mammogram performed last year  She hasn't been previously screened for colorectal cancer  metabolic screening reviewed and updated  Metabolic screening includes lipid profile performed within the past five years, glucose screening performed last year and thyroid function test performed last year  HPI: DIAGNOSED WITH STRESS FX      Review of Systems    Constitutional: No fever, no chills, feels well, no tiredness, no recent weight gain or weight loss  Eyes: No complaints of eye pain, no red eyes, no eyesight problems, no discharge, no dry eyes, no itching of eyes  ENT: no complaints of earache, no loss of hearing, no nose bleeds, no nasal discharge, no sore throat, no hoarseness  Cardiovascular: No complaints of slow heart rate, no fast heart rate, no chest pain, no palpitations, no leg claudication, no lower extremity edema  Respiratory: No complaints of shortness of breath, no wheezing, no cough, no SOB on exertion, no orthopnea, no PND  Gastrointestinal: No complaints of abdominal pain, no constipation, no nausea or vomiting, no diarrhea, no bloody stools  Genitourinary: No complaints of dysuria, no incontinence, no pelvic pain, no dysmenorrhea, no vaginal discharge or bleeding  Musculoskeletal: as noted in HPI  Integumentary: No complaints of skin rash or lesions, no itching, no skin wounds, no breast pain or lump     Neurological: No complaints of headache, no confusion, no convulsions, no numbness, no dizziness or fainting, no tingling, no limb weakness, no difficulty walking  Psychiatric: Not suicidal, no sleep disturbance, no anxiety or depression, no change in personality, no emotional problems  Endocrine: No complaints of proptosis, no hot flashes, no muscle weakness, no deepening of the voice, no feelings of weakness  Hematologic/Lymphatic: No complaints of swollen glands, no swollen glands in the neck, does not bleed easily, does not bruise easily  Active Problems    1  Acute pain of left foot (729 5) (M79 672)   2  Acute pain of right knee (719 46) (M25 561)   3  Anemia (285 9) (D64 9)   4  Anxiety (300 00) (F41 9)   5  Dupuytren's contracture (728 6) (M72 0)   6  Encounter for routine gynecological examination (V72 31) (Z01 419)   7  Encounter for screening for malignant neoplasm of colon (V76 51) (Z12 11)   8  Encounter for screening mammogram for malignant neoplasm of breast (V76 12)   (Z12 31)   9  Hip pain (719 45) (M25 559)   10  Lipid screening (V77 91) (Z13 220)   11  Lower back pain (724 2) (M54 5)   12  Osteoporosis (733 00) (M81 0)   13  Palpitations (785 1) (R00 2)   14  Papillary carcinoma of thyroid (193) (C73)   15  Pernicious anemia (281 0) (D51 0)   16  Postgastrectomy malabsorption (579 3) (K91 2,Z90 3)   17  Postsurgical hypothyroidism (244 0) (E89 0)   18  Status post gastric bypass for obesity (V45 86) (Z98 84)   19  Stress fracture of neck of femur, initial encounter (733 96) (M84 359A)   20  Symptomatic menopausal or female climacteric states (627 2) (N95 1)   21  Vaginosis (616 10) (N76 0)   22  Vitamin D deficiency (268 9) (E55 9)   23   Weight gain (783 1) (R63 5)    Past Medical History    · History of Closed Rib Fracture (807 00)   · History of fall (V15 88) (Z91 81)   · History of malignant neoplasm of thyroid (V10 87) (Z85 850)   · History of viral gastroenteritis (V12 09) (Z86 19)    Surgical History    · History of Appendectomy (47 0)   · History of Breast Surgery   · History of  Section   · History of Cholecystectomy   · History of Gastric Surgery For Morbid Obesity Gastric Bypass   · History of Radical Total Abdominal Hysterectomy   · History of Thyroid Surgery    Family History  Father    · Family history of Lung Cancer (V16 1)  Maternal Grandfather    · Family history of Lung Cancer (V16 1)   · Family history of Skin Cancer (V16 8)  Family History    · Family history of Cancer   · Family history of Coronary Artery Disease (V17 49)   · Family history of Diabetes Mellitus (V18 0)   · Family history of Osteoporosis (V17 81)   · Family history of Rheumatoid Arthritis    Social History    · Denied: History of Alcohol Use (History)   · Being A Social Drinker   · Caffeine Use   · Daily Coffee Consumption (4  Cups/Day)   · Denied: History of Drug use   · Never A Smoker    Current Meds   1  ALPRAZolam 0 5 MG Oral Tablet; take 1 tablet every twelve hours; Last Rx:21Zww1681   Ordered   2  B Complex 100 TR TBCR; Therapy: (Recorded:26Yvf7610) to Recorded   3  Benadryl Allergy CAPS; Therapy: (Recorded:80Vqn1306) to Recorded   4  Calcium 500 MG TABS; TAKE 1 TABLET DAILY; Therapy: 41TAI3653 to Recorded   5  Claritin 10 MG Oral Tablet; Therapy: (Recorded:70Fdw8061) to Recorded   6  Iron Supplement 325 (65 Fe) MG Oral Tablet; Therapy: (Recorded:22Gwe5712) to Recorded   7  Meloxicam 15 MG Oral Tablet; TAKE 1 TABLET BY MOUTH EVERY DAY  Requested for:   45Dmg6625; Last Rx:08Sep2015 Ordered   8  Premarin 0 625 MG/GM Vaginal Cream; APPLY A PEA-SIZED AMOUNT TO VAGINAL   OPENING 3 TO 4 TIMES PER WEEK; Therapy: 64YHU4331 to (Last Rx:10Mar2015)  Requested for: 82TVS8207 Ordered   9  Synthroid 112 MCG Oral Tablet; TAKE 1 TABLET DAILY mon-sat and  2 tablets on   sunday; Therapy: 03BJK7567 to (Evaluate:03Xue9384)  Requested for: 25UKR8225; Last   Rx:01Mar2016 Ordered   10  Verapamil HCl  MG Oral Tablet Extended Release; Take 1 tablet by mouth daily;     Therapy: 62SBQ9736 to (Last Rx:14Jfy8462)  Requested for: 14IRE7201 Ordered   11  Vitamin C 100 MG Oral Tablet; TAKE 1 TABLET DAILY AS DIRECTED; Therapy: 01IED1010 to Recorded   12  Vitamin D-3 5000 UNIT TABS; Therapy: (Recorded:16Byk7970) to Recorded    Allergies    1  Bactrim TABS   2  Scopolamine HBr SOLN   3  Sulfa Drugs    4  Adhesive Tape   5  Seasonal  Denied    6  Codeine Derivatives    Vitals   Recorded: 61RSD6999 27:97KW   Systolic 892   Diastolic 80   Heart Rate 76   Respiration 16   Weight Unobtainable Yes   Height Unobtainable Yes     Physical Exam    Constitutional   General appearance: No acute distress, well appearing and well nourished  Head and Face   Head and face: Normal     Eyes   Conjunctiva and lids: No swelling, erythema or discharge  Pupils and irises: Equal, round, reactive to light  Ears, Nose, Mouth, and Throat   External inspection of ears and nose: Normal     Otoscopic examination: Tympanic membranes translucent with normal light reflex  Canals patent without erythema  Hearing: Normal     Nasal mucosa, septum, and turbinates: Normal without edema or erythema  Lips, teeth, and gums: Normal, good dentition  Oropharynx: Normal with no erythema, edema, exudate or lesions  Neck   Neck: Supple, symmetric, trachea midline, no masses  Thyroid: Normal, no thyromegaly  Pulmonary   Respiratory effort: No increased work of breathing or signs of respiratory distress  Auscultation of lungs: Clear to auscultation  Cardiovascular   Auscultation of heart: Normal rate and rhythm, normal S1 and S2, no murmurs  Examination of extremities for edema and/or varicosities: Normal     Abdomen   Abdomen: Non-tender, no masses  Liver and spleen: No hepatomegaly or splenomegaly  Lymphatic   Palpation of lymph nodes in neck: No lymphadenopathy  Palpation of lymph nodes in axillae: No lymphadenopathy  Musculoskeletal   Gait and station: Normal     Digits and nails: Normal without clubbing or cyanosis      Joints, bones, and muscles: Abnormal     Range of motion: Normal     Stability: Normal     Muscle strength/tone: Normal     Skin   Skin and subcutaneous tissue: Normal without rashes or lesions  Palpation of skin and subcutaneous tissue: Normal turgor  Neurologic   Cranial nerves: Cranial nerves II-XII intact  Cortical function: Normal mental status  Reflexes: 2+ and symmetric  Sensation: No sensory loss  Coordination: Normal finger to nose and heel to shin  Psychiatric   Judgment and insight: Normal     Orientation to person, place, and time: Normal     Recent and remote memory: Intact  Mood and affect: Normal        Results/Data  EKG/ECG- POC 07PAF0992 02:18PM Cheryl Narayan     Test Name Result Flag Reference   EKG/ECG normal       (1) LIPID PANEL, FASTING 97Mmt8924 08:06AM [facility]     Test Name Result Flag Reference   CHOLESTEROL 186 mg/dL     HDL,DIRECT 83 mg/dL H 40-60   Specimen collection should occur prior to Metamizole administration due to the potential for falsely depressed results  LDL CHOLESTEROL CALCULATED 63 mg/dL  0-100   Triglyceride:         Normal              <150 mg/dl       Borderline High    150-199 mg/dl       High               200-499 mg/dl       Very High          >499 mg/dl  Cholesterol:         Desirable        <200 mg/dl      Borderline High  200-239 mg/dl      High             >239 mg/dl  HDL Cholesterol:        High    >59 mg/dL      Low     <41 mg/dL  LDL CALCULATED:    This screening LDL is a calculated result  It does not have the accuracy of the Direct Measured LDL in the monitoring of patients with hyperlipidemia and/or statin therapy  Direct Measure LDL (BUV434) must be ordered separately in these patients  TRIGLYCERIDES 199 mg/dL H <=150   Specimen collection should occur prior to N-Acetylcysteine or Metamizole administration due to the potential for falsely depressed results       (1) HEMOGLOBIN A1C 64Cpg8172 08:06AM [facility]     Test Name Result Flag Reference   HEMOGLOBIN A1C 5 3 %  4 2-6 3   EST  AVG  GLUCOSE 105 mg/dl       A 12 lead ECG was performed and was normal  No acute ischemia  Rhythm and rate: normal sinus rhythm  P-waves: the P wave is normal    QRS: the QRS is normal    ST segment: the ST segments are normal    Comparison to prior ECGs:  no prior ECGs were available for comparison  Health Management  Encounter for routine gynecological examination   BREAST EXAM; every 1 year; Next Due: 67UCW1973; Overdue  PELVIC EXAM; every 1 year; Next Due: 56JVO9985; Overdue    Future Appointments    Date/Time Provider Specialty Site   08/25/2016 08:20 CONRAD Duval   Endocrinology [facility] ENDOCRINOLOGY   08/10/2017 09:30 AM Andreina Hewitt DO Family Medicine [address] [place] [address]     Signatures   Electronically signed by : Peri Comer DO; Aug  4 2016  2:34PM EST                       (Author)

## 2018-01-12 VITALS
SYSTOLIC BLOOD PRESSURE: 168 MMHG | DIASTOLIC BLOOD PRESSURE: 96 MMHG | BODY MASS INDEX: 24.64 KG/M2 | WEIGHT: 157 LBS | HEART RATE: 73 BPM | HEIGHT: 67 IN

## 2018-01-12 VITALS
BODY MASS INDEX: 24.64 KG/M2 | DIASTOLIC BLOOD PRESSURE: 68 MMHG | HEART RATE: 76 BPM | WEIGHT: 157 LBS | HEIGHT: 67 IN | SYSTOLIC BLOOD PRESSURE: 112 MMHG

## 2018-01-12 NOTE — RESULT NOTES
Verified Results  (1) CBC/PLT/DIFF 70UFF3309 10:04AM [facility]     Test Name Result Flag Reference   WBC COUNT 4 89 Thousand/uL  4 31-10 16   RBC COUNT 4 24 Million/uL  3 81-5 12   HEMOGLOBIN 9 6 g/dL L 11 5-15 4   HEMATOCRIT 32 0 % L 34 8-46  1   MCV 76 fL L 82-98   MCH 22 6 pg L 26 8-34 3   MCHC 30 0 g/dL L 31 4-37 4   RDW 18 6 % H 11 6-15 1   MPV 8 8 fL L 8 9-12 7   PLATELET COUNT 283 Thousands/uL  149-390   nRBC AUTOMATED 0 /100 WBCs     NEUTROPHILS RELATIVE PERCENT 46 %  43-75   LYMPHOCYTES RELATIVE PERCENT 47 % H 14-44   MONOCYTES RELATIVE PERCENT 6 %  4-12   EOSINOPHILS RELATIVE PERCENT 1 %  0-6   BASOPHILS RELATIVE PERCENT 0 %  0-1   NEUTROPHILS ABSOLUTE COUNT 2 23 Thousands/? ??L  1 85-7 62   LYMPHOCYTES ABSOLUTE COUNT 2 29 Thousands/? ??L  0 60-4 47   MONOCYTES ABSOLUTE COUNT 0 28 Thousand/? ??L  0 17-1 22   EOSINOPHILS ABSOLUTE COUNT 0 06 Thousand/? ??L  0 00-0 61   BASOPHILS ABSOLUTE COUNT 0 02 Thousands/? ??L  0 00-0 10

## 2018-01-12 NOTE — RESULT NOTES
Discussion/Summary   HEMOGLOBIN HAS DROPPED  IRON AND FERRITIN ARE LOW  WE NEED TO GET YOU SET UP FOR IRON TRANSFUSIONS  Leidy Ribera     Verified Results  (1) CBC/PLT/DIFF 72MGW7656 07:23AM Yemi Valladares     Test Name Result Flag Reference   WBC COUNT 4 42 Thousand/uL  4 31-10 16   RBC COUNT 3 98 Million/uL  3 81-5 12   HEMOGLOBIN 8 5 g/dL L 11 5-15 4   HEMATOCRIT 30 1 % L 34 8-46  1   MCV 76 fL L 82-98   MCH 21 4 pg L 26 8-34 3   MCHC 28 2 g/dL L 31 4-37 4   RDW 16 7 % H 11 6-15 1   MPV 9 4 fL  8 9-12 7   PLATELET COUNT 892 Thousands/uL  149-390   nRBC AUTOMATED 0 /100 WBCs     NEUTROPHILS RELATIVE PERCENT 43 %  43-75   LYMPHOCYTES RELATIVE PERCENT 45 % H 14-44   MONOCYTES RELATIVE PERCENT 11 %  4-12   EOSINOPHILS RELATIVE PERCENT 1 %  0-6   BASOPHILS RELATIVE PERCENT 0 %  0-1   NEUTROPHILS ABSOLUTE COUNT 1 91 Thousands/? ??L  1 85-7 62   LYMPHOCYTES ABSOLUTE COUNT 1 95 Thousands/? ??L  0 60-4 47   MONOCYTES ABSOLUTE COUNT 0 50 Thousand/? ??L  0 17-1 22   EOSINOPHILS ABSOLUTE COUNT 0 04 Thousand/? ??L  0 00-0 61   BASOPHILS ABSOLUTE COUNT 0 01 Thousands/? ??L  0 00-0 10     (1) COMPREHENSIVE METABOLIC PANEL 38VQB8371 84:50KG Yemi Valladares     Test Name Result Flag Reference   SODIUM 141 mmol/L  136-145   POTASSIUM 4 7 mmol/L  3 5-5 3   CHLORIDE 109 mmol/L H 100-108   CARBON DIOXIDE 26 mmol/L  21-32   ANION GAP (CALC) 6 mmol/L  4-13   BLOOD UREA NITROGEN 7 mg/dL  5-25   CREATININE 0 68 mg/dL  0 60-1 30   Standardized to IDMS reference method   CALCIUM 8 6 mg/dL  8 3-10 1   BILI, TOTAL 0 51 mg/dL  0 20-1 00   ALK PHOSPHATAS 83 U/L     ALT (SGPT) 19 U/L  12-78   Specimen collection should occur prior to Sulfasalazine and/or Sulfapyridine administration due to the potential for falsely depressed results  AST(SGOT) 21 U/L  5-45   Specimen collection should occur prior to Sulfasalazine administration due to the potential for falsely depressed results     ALBUMIN 3 3 g/dL L 3 5-5 0   TOTAL PROTEIN 7 0 g/dL  6 4-8 2 eGFR 98 ml/min/1 73sq m     National Kidney Disease Education Program recommendations are as follows:  GFR calculation is accurate only with a steady state creatinine  Chronic Kidney disease less than 60 ml/min/1 73 sq  meters  Kidney failure less than 15 ml/min/1 73 sq  meters  GLUCOSE FASTING 92 mg/dL  65-99   Specimen collection should occur prior to Sulfasalazine administration due to the potential for falsely depressed results  Specimen collection should occur prior to Sulfapyridine administration due to the potential for falsely elevated results  (1) HEMOGLOBIN A1C 20Oct2017 07:23AM Donte Jaocme     Test Name Result Flag Reference   HEMOGLOBIN A1C 4 9 %  4 2-6 3   EST  AVG  GLUCOSE 94 mg/dl       (1) LIPID PANEL FASTING W DIRECT LDL REFLEX 20Oct2017 07:23AM Donte Moorhead     Test Name Result Flag Reference   CHOLESTEROL 149 mg/dL     LDL CHOLESTEROL CALCULATED 66 mg/dL  0-100   Triglyceride:        Normal <150 mg/dl   Borderline High 150-199 mg/dl   High 200-499 mg/dl   Very High >499 mg/dl      Cholesterol:       Desirable <200 mg/dl    Borderline High 200-239 mg/dl    High >239 mg/dl      HDL Cholesterol:       High>59 mg/dL    Low <41 mg/dL      HDL Cholesterol:       High>59 mg/dL    Low <41 mg/dL      This screening LDL is a calculated result  It does not have the accuracy of the Direct Measured LDL in the monitoring of patients with hyperlipidemia and/or statin therapy  Direct Measure LDL (YGY904) must be ordered separately in these patients  TRIGLYCERIDES 76 mg/dL  <=150   Specimen collection should occur prior to N-Acetylcysteine or Metamizole administration due to the potential for falsely depressed results  HDL,DIRECT 68 mg/dL H 40-60   Specimen collection should occur prior to Metamizole administration due to the potential for falsley depressed results       (1) TSH WITH FT4 REFLEX 20Oct2017 07:23AM [facility]     Test Name Result Flag Reference   TSH 0 012 uIU/mL L 0 358-3 740   Patients undergoing fluorescein dye angiography may retain small amounts of fluorescein in the body for 48-72 hours post procedure  Samples containing fluorescein can produce falsely depressed TSH values  If the patient had this procedure,a specimen should be resubmitted post fluorescein clearance  The recommended reference ranges for TSH during pregnancy are as follows:  First trimester 0 1 to 2 5 uIU/mL  Second trimester  0 2 to 3 0 uIU/mL  Third trimester 0 3 to 3 0 uIU/m   T4,FREE 1 77 ng/dL H 0 76-1 46   Specimen collection should occur prior to Sulfasalazine administration due to the potential for falsely elevated results  (1) IRON [id number] 07:23AM Lavona Colander     Test Name Result Flag Reference   IRON 19 ug/dL L    Patients treated with metal-binding drugs (ie  Deferoxamine) may have depressed iron values  (1) FERRITIN 55MQS1318 07:23AM Lavona Colander     Test Name Result Flag Reference   FERRITIN 4 ng/mL L 8-388     (1) VITAMIN B12 37VXI3295 07:23AM Lavona Colander     Test Name Result Flag Reference   VITAMIN B12 1835 pg/mL H 100-900     (1) VITAMIN D 25-HYDROXY 86Hyd3905 07:23AM Lavona Colander     Test Name Result Flag Reference   VIT D 25-HYDROX 34 4 ng/mL  30 0-100 0   This assay is a certified procedure of the CDC Vitamin D Standardization Certification Program (VDSCP)     Deficiency <20ng/ml   Insufficiency 20-30ng/ml   Sufficient  ng/ml     *Patients undergoing fluorescein dye angiography may retain small amounts of fluorescein in the body for 48-72 hours post procedure  Samples containing fluorescein can produce falsely elevated Vitamin D values  If the patient had this procedure, a specimen should be resubmitted post fluorescein clearance

## 2018-01-13 VITALS
BODY MASS INDEX: 24.64 KG/M2 | HEIGHT: 67 IN | SYSTOLIC BLOOD PRESSURE: 151 MMHG | HEART RATE: 80 BPM | WEIGHT: 157 LBS | DIASTOLIC BLOOD PRESSURE: 101 MMHG

## 2018-01-13 VITALS
OXYGEN SATURATION: 98 % | SYSTOLIC BLOOD PRESSURE: 118 MMHG | BODY MASS INDEX: 24.75 KG/M2 | HEART RATE: 82 BPM | DIASTOLIC BLOOD PRESSURE: 66 MMHG | WEIGHT: 158 LBS | TEMPERATURE: 97.3 F

## 2018-01-13 VITALS
HEART RATE: 84 BPM | WEIGHT: 160.25 LBS | SYSTOLIC BLOOD PRESSURE: 120 MMHG | BODY MASS INDEX: 25.15 KG/M2 | RESPIRATION RATE: 18 BRPM | DIASTOLIC BLOOD PRESSURE: 70 MMHG | HEIGHT: 67 IN

## 2018-01-13 VITALS
HEART RATE: 74 BPM | SYSTOLIC BLOOD PRESSURE: 130 MMHG | BODY MASS INDEX: 24 KG/M2 | DIASTOLIC BLOOD PRESSURE: 80 MMHG | WEIGHT: 153.25 LBS

## 2018-01-13 NOTE — RESULT NOTES
Message   Continue to show osteoporosis-- [facility] is working on getting the reclast coverage info, she has called insurance, waiting to hear back     Verified Results  * DXA [address] [id number] 11:30AM Isiah Martinez Order Number: [id number]     Test Name Result Flag Reference   DXA BONE DENSITY SPINE HIP AND PELVIS (Report)     CENTRAL DXA SCAN     CLINICAL HISTORY:  55year old post-menopausal  female risk factors include history of degenerative arthritis, thyroid carcinoma, renal calculi and scoliosis  Gastric bypass  Prior estrogen use, Forteo use and Reclast use  TECHNIQUE: Bone densitometry was performed using a HoloNeuroVigil Horizon A  bone densitometer  Regions of interest appear properly placed  There are no obvious fractures or other confounding variables which could limit the study  Degenerative changes of the   lumbar spine and hip  This will falsely elevate the bone mineral densities in these regions  COMPARISON: Several, most recent September 18, 2014     RESULTS:    LUMBAR SPINE: L1-L4:   BMD 0 954 gm/cm2   T-score -0 8   Z-score 0 2     LEFT TOTAL HIP:   BMD 0 575 gm/cm2   T-score -3 0   Z-score -2 3     LEFT FEMORAL NECK:   BMD 0 518 gm/cm2   T-score -3 0   Z-score -1 9           ASSESSMENT:   1  Based on the WHO classification, the T-score of -3 0 in the left hip is consistent with osteoporosis  2  Since the prior study, there has been a mild increase in the bone mineral density of the lumbar spine and a mild decrease in the bone mineral density of the left hip  It should be noted however that the examinations were performed on dissimilar DXA    units  3  According to the [address] [place] [address], prescription therapy is recommended with a T-score of -2 5 or less in the spine or hip after appropriate evaluation to exclude secondary causes     4  A daily intake of at least 1200 mg Calcium and 800 to 1000 IU of Vitamin D, as well as weight bearing and muscle strengthening exercise, fall prevention and avoidance of tobacco and excessive alcohol intake as basic preventive measures are    suggested  5  Repeat DXA scan in 18-24 months as clinically indicated             WHO CLASSIFICATION:   Normal (a T-score of -1 0 or higher)   Low bone mineral density (a T-score of less than -1 0 but higher than -2 5)   Osteoporosis (a T-score of -2 5 or less)   Severe osteoporosis (a T-score of -2 5 or less with a fragility fracture)             Workstation performed: [id number]       Signatures   Electronically signed by : RUPERT Sahu; Mar 10 2016  1:09PM EST                       (Author)

## 2018-01-14 VITALS
BODY MASS INDEX: 25.62 KG/M2 | WEIGHT: 163.25 LBS | DIASTOLIC BLOOD PRESSURE: 82 MMHG | SYSTOLIC BLOOD PRESSURE: 133 MMHG | HEIGHT: 67 IN | HEART RATE: 90 BPM

## 2018-01-14 VITALS
DIASTOLIC BLOOD PRESSURE: 78 MMHG | BODY MASS INDEX: 25.31 KG/M2 | WEIGHT: 161.25 LBS | HEART RATE: 80 BPM | SYSTOLIC BLOOD PRESSURE: 138 MMHG | HEIGHT: 67 IN

## 2018-01-15 NOTE — RESULT NOTES
Message   tsh normal but higher than it should be for her given history of throid cancer , was at goal in [place] , has she changed the way she takes her meds ? missed any ? Verified Results  (1) TSH 59Ahv3812 04:12PM Fadia Kaufman Order Number: [id number]     Test Name Result Flag Reference   TSH 2 900 uIU/mL  0 358-3 740   - Patient Instructions: This bloodwork is non-fasting  Please drink two glasses of water morning of bloodwork  - Patient Instructions: This bloodwork is non-fasting  Please drink two glasses of water morning of bloodwork  Patients undergoing fluorescein dye angiography may retain small amounts of fluorescein in the body for 48-72 hours post procedure  Samples containing fluorescein can produce falsely depressed TSH values  If the patient had this procedure,a specimen should be resubmitted post fluorescein clearance  The recommended reference ranges for TSH during pregnancy are as follows:  First trimester 0 1 to 2 5 uIU/mL  Second trimester  0 2 to 3 0 uIU/mL  Third trimester 0 3 to 3 0 uIU/m     (1) T4, FREE [id number] 04:12PM Maxwell VIZCAINO Order Number: [id number]     Test Name Result Flag Reference   T4,FREE 1 30 ng/dL  0 76-1 46   - Patient Instructions: This bloodwork is non-fasting  Please drink two glasses of water morning of bloodwork

## 2018-01-15 NOTE — RESULT NOTES
Discussion/Summary   BETTER     [facility]     Verified Results  (1) CBC/PLT/DIFF [id number] 02:41PM Oksana Yeboah     Test Name Result Flag Reference   WBC COUNT 5 78 Thousand/uL  4 31-10 16   RBC COUNT 4 52 Million/uL  3 81-5 12   HEMOGLOBIN 11 3 g/dL L 11 5-15 4   HEMATOCRIT 36 4 %  34 8-46  1   MCV 81 fL L 82-98   MCH 25 0 pg L 26 8-34 3   MCHC 31 0 g/dL L 31 4-37 4   RDW 24 5 % H 11 6-15 1   MPV 8 6 fL L 8 9-12 7   PLATELET COUNT 736 Thousands/uL  149-390   nRBC AUTOMATED 0 /100 WBCs     NEUTROPHILS RELATIVE PERCENT 53 %  43-75   LYMPHOCYTES RELATIVE PERCENT 41 %  14-44   MONOCYTES RELATIVE PERCENT 5 %  4-12   EOSINOPHILS RELATIVE PERCENT 1 %  0-6   BASOPHILS RELATIVE PERCENT 0 %  0-1   NEUTROPHILS ABSOLUTE COUNT 3 09 Thousands/? ??L  1 85-7 62   LYMPHOCYTES ABSOLUTE COUNT 2 36 Thousands/? ??L  0 60-4 47   MONOCYTES ABSOLUTE COUNT 0 26 Thousand/? ??L  0 17-1 22   EOSINOPHILS ABSOLUTE COUNT 0 04 Thousand/? ??L  0 00-0 61   BASOPHILS ABSOLUTE COUNT 0 01 Thousands/? ??L  0 00-0 10     (1) IRON [id number] 02:41PM [facility]     Test Name Result Flag Reference   IRON 748 ug/dL H    Slightly Hemolyzed; Results May be Affected  Patients treated with metal-binding drugs (ie  Deferoxamine) may have depressed iron values

## 2018-01-16 NOTE — RESULT NOTES
Message   TSH at goal-- continue current medication  THyroglobulin undetectable and antibodies are stable, continue to monitor  Verified Results  (1) THYROGLOBULIN/QUANT W/ANTIBODY PANEL [id number] 04:14PM Deshawnarash Lynn Order Number: [id number]     Test Name Result Flag Reference   THYROGLOB AB 3 5 IU/mL H 0 0 - 0 9   Thyroglobulin Antibody measured by [facility] Methodology    Performed at:  [address] [place] [address]  [id number]  : Valencia Banks MD, Phone:  [phone]   THYROGLOBULIN (TG-KEV) <2 0 ng/mL     Reference Range:  Pubertal Children  and Adults: <40  According to the [facility] of Clinical Biochemistry,  the reference interval for Thyroglobulin (TG) should be  related to euthyroid patients and not for patients who  underwent thyroidectomy  TG reference intervals for these  patients depend on the residual mass of the thyroid tissue  left after surgery  Establishing a post-operative baseline  is recommended  The assay quantitation limit is 2 0 ng/mL  Performed at:  [address] [facility]  [address] [place] [address]  [de-identified]  : Chuck Seaman MD, Phone:  [phone]     (1) TSH [id number] 04:14PM Elly Lynn Order Number: [id number]     Test Name Result Flag Reference   TSH 0 989 uIU/mL  0 358-3 740   - Patient Instructions: This bloodwork is non-fasting  Please drink two glasses of water morning of bloodwork  - Patient Instructions: This bloodwork is non-fasting  Please drink two glasses of water morning of bloodwork  Patients undergoing fluorescein dye angiography may retain small amounts of fluorescein in the body for 48-72 hours post procedure  Samples containing fluorescein can produce falsely depressed TSH values  If the patient had this procedure,a specimen should be resubmitted post fluorescein clearance            The recommended reference ranges for TSH during pregnancy are as follows:  First trimester 0 1 to 2 5 uIU/mL  Second trimester  0 2 to 3 0 uIU/mL  Third trimester 0 3 to 3 0 uIU/m     (1) T4, FREE [id number] 04:14PM Namratadestiny Cain Order Number: [id number]     Test Name Result Flag Reference   T4,FREE 1 34 ng/dL  0 76-1 46   - Patient Instructions: This bloodwork is non-fasting  Please drink two glasses of water morning of bloodwork

## 2018-01-16 NOTE — CONSULTS
Chief Complaint  Hanging pannus, abdominal wall scars      History of Present Illness  49 yo female with history of massive weight loss who has been weight stable for more than 5 years who has a hanging pannus and painful scarring of her abdomen that she would like to remove  She leads a very active lifestyle and has done a remarkable job of maintaining her weight but finds it more difficult to exercise with her pannus  Review of Systems    Constitutional: No fever, no chills, feels well, no tiredness, no recent weight gain or weight loss  Eyes: No complaints of eye pain, no red eyes, no eyesight problems, no discharge, no dry eyes, no itching of eyes  ENT: no complaints of earache, no loss of hearing, no nose bleeds, no nasal discharge, no sore throat, no hoarseness  Cardiovascular: No complaints of slow heart rate, no fast heart rate, no chest pain, no palpitations, no leg claudication, no lower extremity edema  Respiratory: No complaints of shortness of breath, no wheezing, no cough, no SOB on exertion, no orthopnea, no PND  Gastrointestinal: No complaints of abdominal pain, no constipation, no nausea or vomiting, no diarrhea, no bloody stools  Genitourinary: No complaints of dysuria, no incontinence, no pelvic pain, no dysmenorrhea, no vaginal discharge or bleeding  Musculoskeletal: No complaints of arthralgias, no myalgias, no joint swelling or stiffness, no limb pain or swelling  Integumentary: as noted in HPI  Neurological: No complaints of headache, no confusion, no convulsions, no numbness, no dizziness or fainting, no tingling, no limb weakness, no difficulty walking  Psychiatric: Not suicidal, no sleep disturbance, no anxiety or depression, no change in personality, no emotional problems  Endocrine: No complaints of proptosis, no hot flashes, no muscle weakness, no deepening of the voice, no feelings of weakness     Hematologic/Lymphatic: No complaints of swollen glands, no swollen glands in the neck, does not bleed easily, does not bruise easily  ROS reviewed  Past Medical History    The active problems and past medical history were reviewed and updated today  Social History  The social history was reviewed and updated today  Current Meds    The medication list was reviewed and updated today  Physical Exam    Constitutional   General appearance: No acute distress, well appearing and well nourished  Head and Face   Head and face: Normal     Eyes   Pupils and irises: Equal, round, reactive to light  Ears, Nose, Mouth, and Throat   External inspection of ears and nose: Normal     Neck   Neck: Supple, symmetric, trachea midline, no masses  Pulmonary   Respiratory effort: No increased work of breathing or signs of respiratory distress  Cardiovascular   Auscultation of heart: Normal rate and rhythm, normal S1 and S2, no murmurs  Chest   Chest: Normal     Abdomen   Abdomen: Non-tender, no masses  Large hanging pannus with hypertrophic midline and pfannesteil scar  Liver and spleen: No hepatomegaly or splenomegaly  Skin   Skin and subcutaneous tissue: Abnormal   excess skin and subutaneous tissue of the abdomen  Discussion/Summary    49 yo female with a large hanging pannus and 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 and hysterectomy who would benefit in function and appearance from a panniculectomy  Given her vertical scar from gastric bypass, I could also excise this at that time  I noted that it may not be possible to excise her hysterectomy scar at that time given the uncertainty of predicting the tension from elevating the flap at the time of panniculectomy  She would likely end up with a diane de lis technique and she understands that she will still have a vertical scar component  I have noted that it might be possible that insurance would agree to cover the panniculectomy component but possibly not the scar revisions   We obtained photographs today and will submit these to insurance        Signatures   Electronically signed by : CONRAD Dorman ; Jan 26 2016  7:06PM EST                       (Author)

## 2018-01-17 NOTE — RESULT NOTES
Verified Results  * MAMMO SCREENING BILATERAL W CAD [id number] 11:31AM Kurtis Conte Order Number: [id number]     Test Name Result Flag Reference   MAMMO SCREENING BILATERAL W CAD (Report)     Patient History:   Patient is postmenopausal and has history of other cancer at age    51  Family history of unknown cancer in father at age 48 or over,    breast cancer in paternal aunt at age 62, premenopausal breast    cancer in maternal cousin at age 44, and unknown cancer in    maternal grandfather at age 48 or over  Genetically tested negative for BRCA1 in maternal cousin  Benign stereotactic core biopsy of the left breast    Took estrogen  Took unspecified hormones  Patient has never smoked  Patient's BMI is 23 7  Reason for exam: screening (asymptomatic)  Mammo Screening Bilateral W CAD: March 9, 2016 - Check In #:    [de-identified]   Bilateral MLO and CC view(s) were taken  Technologist: CELINE Mccauley (CELINE)(M)   Prior study comparison: March 23, 2015, bilateral [facility] digtl    scrn mammo w/CAD, performed at [address] [facility]  March 13, 2014, digital bilateral screening mammogram    performed at [address] [place] [address]  March 7, 2013, digital   bilateral screening mammogram performed at [facility]  January 24, 2012, bilateral digital screening mammogram,   performed at [place] [address]  January 19, 2011, bilateral digital screening mammogram, performed at [address] [place] [address]      There are scattered fibroglandular densities  The parenchymal pattern appears stable  No dominant soft tissue    mass or suspicious calcifications are noted  The skin and nipple   contours are within normal limits  No mammographic evidence of malignancy  No    significant changes when compared with prior studies  ASSESSMENT: BiRad:1 - Negative     Recommendation:   Routine screening mammogram in 1 year   A reminder letter will be   scheduled  Analyzed by CAD     8-10% of cancers will be missed on mammography  Management of a    palpable abnormality must be based on clinical grounds  Patients   will be notified of their results via letter from our facility  Accredited by [employer] of Radiology and FDA  Transcription Location: CELINE Vinson 98: [id number]     Risk Value(s):   Daleer-Cusumanck 10 Year: 4 344%, Tyrer-Cuzick Lifetime: 13 980%,    Myriad Table: 2 6%, CHAVEZ 5 Year: 1 8%, NCI Lifetime: 12 4%   Signed by:   Kalyan Pierre MD   3/9/16       Discussion/Summary   GREAT!

## 2018-01-17 NOTE — CONSULTS
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 and hysterectomy who would benefit in function and appearance from a panniculectomy  Given her vertical scar from gastric bypass, I could also excise this at that time  I noted that it may not be possible to excise her hysterectomy scar at that time given the uncertainty of predicting the tension from elevating the flap at the time of panniculectomy  She would likely end up with a diane de lis technique and she understands that she will still have a vertical scar component  I have noted that it might be possible that insurance would agree to cover the panniculectomy component but possibly not the scar revisions  We obtained photographs today and will submit these to insurance  Chief Complaint  Chief Complaint Free Text Note Form: Hanging pannus, abdominal wall scars      History of Present Illness  HPI: 49 yo female with history of massive weight loss who has been weight stable for more than 5 years who has a hanging pannus and painful scarring of her abdomen that she would like to remove  She leads a very active lifestyle and has done a remarkable job of maintaining her weight but finds it more difficult to exercise with her pannus  Review of Systems  Complete-Female:   Constitutional: No fever, no chills, feels well, no tiredness, no recent weight gain or weight loss  Eyes: No complaints of eye pain, no red eyes, no eyesight problems, no discharge, no dry eyes, no itching of eyes  ENT: no complaints of earache, no loss of hearing, no nose bleeds, no nasal discharge, no sore throat, no hoarseness  Cardiovascular: No complaints of slow heart rate, no fast heart rate, no chest pain, no palpitations, no leg claudication, no lower extremity edema  Respiratory: No complaints of shortness of breath, no wheezing, no cough, no SOB on exertion, no orthopnea, no PND  Gastrointestinal: No complaints of abdominal pain, no constipation, no nausea or vomiting, no diarrhea, no bloody stools  Genitourinary: No complaints of dysuria, no incontinence, no pelvic pain, no dysmenorrhea, no vaginal discharge or bleeding  Musculoskeletal: No complaints of arthralgias, no myalgias, no joint swelling or stiffness, no limb pain or swelling  Integumentary: as noted in HPI  Neurological: No complaints of headache, no confusion, no convulsions, no numbness, no dizziness or fainting, no tingling, no limb weakness, no difficulty walking  Psychiatric: Not suicidal, no sleep disturbance, no anxiety or depression, no change in personality, no emotional problems  Endocrine: No complaints of proptosis, no hot flashes, no muscle weakness, no deepening of the voice, no feelings of weakness  Hematologic/Lymphatic: No complaints of swollen glands, no swollen glands in the neck, does not bleed easily, does not bruise easily  ROS Reviewed:   ROS reviewed  Past Medical History  Active Problems And Past Medical History Reviewed: The active problems and past medical history were reviewed and updated today  Social History  Social History Reviewed: The social history was reviewed and updated today  Current Meds  Medication List Reviewed: The medication list was reviewed and updated today  Physical Exam  General Complete Exam:   Constitutional   General appearance: No acute distress, well appearing and well nourished  Head and Face   Head and face: Normal     Eyes   Pupils and irises: Equal, round, reactive to light  Ears, Nose, Mouth, and Throat   External inspection of ears and nose: Normal     Neck   Neck: Supple, symmetric, trachea midline, no masses  Pulmonary   Respiratory effort: No increased work of breathing or signs of respiratory distress  Cardiovascular   Auscultation of heart: Normal rate and rhythm, normal S1 and S2, no murmurs      Chest   Chest: Normal     Abdomen   Abdomen: Non-tender, no masses  Large hanging pannus with hypertrophic midline and pfannesteil scar  Liver and spleen: No hepatomegaly or splenomegaly  Skin   Skin and subcutaneous tissue: Abnormal   excess skin and subutaneous tissue of the abdomen        Future Appointments    Date/Time Provider Specialty Site   06/14/2016 05:00 PM Esmer Rodriguez DO Family Medicine [facility]     Signatures   Electronically signed by : CONRAD Maxwell ; Jan 26 2016  7:06PM EST                       (Author)

## 2018-01-17 NOTE — RESULT NOTES
Message   no evidence of recurrent cancer     Verified Results  [address] [id number] 01:21PM Arthor Brittle Order Number: [id number]    - Patient Instructions: To schedule this appointment, please contact Central Scheduling at [phone]  Test Name Result Flag Reference   US HEAD NECK LYMPH NODE [address] (Report)     NECK ULTRASOUND     INDICATION:  History of papillary carcinoma  COMPARISON: 3/14/2016     FINDINGS:     Ultrasound of the thyroidectomy bed and cervical lymph node chains was performed with a high frequency linear transducer  There is no suspicion of recurrent mass in the thyroidectomy bed  Lymph nodes maintain normal morphologic contour, echogenicity and short axis dimensions of less than 0 7 cm  No evidence for microcalcification or focal nodularity  IMPRESSION:     No evidence of recurrent or metastatic disease         Workstation performed: [id number]     Signed by:   Josh Dale MD   3/9/17

## 2018-01-23 NOTE — RESULT NOTES
Discussion/Summary   Colon polyp was sessile serrated adenoma, needs repeat colonoscopy in 3 years  Repeat hemoglobin, if continues to be anemic, will need PillCam for further evaluation of iron deficiency anemia  Follow-up in the office with the PA and 6-8 weeks     Verified Results  (1) TISSUE EXAM [id number] 09:43AM Edgardo Means     Test Name Result Flag Reference   LAB AP CASE REPORT (Report)     Surgical Pathology Report             Case: [id number]                   Authorizing Provider: Jeremy Richards MD    Collected:      11/21/2017 0943        Ordering Location:   [facility]    Received:      11/21/2017 [address] [place] [address]                            Pathologist:      Danelle Robin MD                                Specimen:  Large Intestine, Right/Ascending Colon, Cold Bx Ascending colon polyp   LAB AP FINAL DIAGNOSIS (Report)     A  Large Intestine, Right/Ascending Colon, Cold Biopsy Ascending colon   polyp:  - Sessile serrated adenoma, fragments  - Negative for high grade dysplasia and malignancy  Interpretation performed at [facility], [address] [place] [address]   [id number]  Electronically signed by Danelle Robin MD on 11/24/2017 at 1:43 PM   LAB AP SURGICAL ADDITIONAL INFORMATION (Report)     All controls performed with the immunohistochemical stains reported above   reacted appropriately  These tests were developed and their performance   characteristics determined by [facility] or   [address] [place] [address]  They may not be cleared or approved by the [place]  Food and Drug Administration  The FDA has determined that such clearance   or approval is not necessary  These tests are used for clinical purposes  They should not be regarded as investigational or for research   This   laboratory has been approved by [place] [address], designated as a high-complexity   laboratory and is qualified to perform these tests  LAB AP GROSS DESCRIPTION (Report)     A  The specimen is received in formalin, labeled with the patient's name   and hospital number, and is designated cold biopsy ascending colon   polyp, are two irregularly shaped fragments of tan soft tissue measuring   0 6 and 0 2 cm in greatest dimension  Entirely submitted  One cassette  Note: The estimated total formalin fixation time based upon information   provided by the submitting clinician and the standard processing schedule   is less than 72 hours  RRavotti   LAB AP CLINICAL INFORMATION      Anemia  Blood in stool  GERD  6-7 mm, sessile polyp noted in the ascending colon, removed using cold biopsy forceps

## 2018-01-24 VITALS
HEIGHT: 67 IN | SYSTOLIC BLOOD PRESSURE: 136 MMHG | WEIGHT: 157 LBS | HEART RATE: 65 BPM | BODY MASS INDEX: 24.64 KG/M2 | DIASTOLIC BLOOD PRESSURE: 87 MMHG

## 2018-01-26 ENCOUNTER — APPOINTMENT (OUTPATIENT)
Dept: LAB | Facility: CLINIC | Age: 58
End: 2018-01-26
Payer: COMMERCIAL

## 2018-01-26 DIAGNOSIS — D64.9 ANEMIA: ICD-10-CM

## 2018-01-26 DIAGNOSIS — K92.1 MELENA: ICD-10-CM

## 2018-01-26 LAB
ERYTHROCYTE [DISTWIDTH] IN BLOOD BY AUTOMATED COUNT: 18.4 % (ref 11.6–15.1)
HCT VFR BLD AUTO: 39.6 % (ref 34.8–46.1)
HGB BLD-MCNC: 13 G/DL (ref 11.5–15.4)
MCH RBC QN AUTO: 29.5 PG (ref 26.8–34.3)
MCHC RBC AUTO-ENTMCNC: 32.8 G/DL (ref 31.4–37.4)
MCV RBC AUTO: 90 FL (ref 82–98)
PLATELET # BLD AUTO: 220 THOUSANDS/UL (ref 149–390)
PMV BLD AUTO: 9.1 FL (ref 8.9–12.7)
RBC # BLD AUTO: 4.41 MILLION/UL (ref 3.81–5.12)
WBC # BLD AUTO: 4.74 THOUSAND/UL (ref 4.31–10.16)

## 2018-01-26 PROCEDURE — 85027 COMPLETE CBC AUTOMATED: CPT

## 2018-01-26 PROCEDURE — 36415 COLL VENOUS BLD VENIPUNCTURE: CPT

## 2018-02-06 ENCOUNTER — OFFICE VISIT (OUTPATIENT)
Dept: OBGYN CLINIC | Facility: MEDICAL CENTER | Age: 58
End: 2018-02-06
Payer: COMMERCIAL

## 2018-02-06 VITALS
HEART RATE: 69 BPM | HEIGHT: 67 IN | SYSTOLIC BLOOD PRESSURE: 150 MMHG | DIASTOLIC BLOOD PRESSURE: 86 MMHG | BODY MASS INDEX: 25.11 KG/M2 | WEIGHT: 160 LBS

## 2018-02-06 DIAGNOSIS — M17.11 PRIMARY OSTEOARTHRITIS OF RIGHT KNEE: ICD-10-CM

## 2018-02-06 DIAGNOSIS — M17.12 PRIMARY OSTEOARTHRITIS OF LEFT KNEE: Primary | ICD-10-CM

## 2018-02-06 PROCEDURE — 20610 DRAIN/INJ JOINT/BURSA W/O US: CPT | Performed by: ORTHOPAEDIC SURGERY

## 2018-02-06 PROCEDURE — 99214 OFFICE O/P EST MOD 30 MIN: CPT | Performed by: ORTHOPAEDIC SURGERY

## 2018-02-06 RX ORDER — BUPIVACAINE HYDROCHLORIDE 2.5 MG/ML
4 INJECTION, SOLUTION INFILTRATION; PERINEURAL
Status: COMPLETED | OUTPATIENT
Start: 2018-02-06 | End: 2018-02-06

## 2018-02-06 RX ORDER — LIDOCAINE HYDROCHLORIDE 10 MG/ML
4 INJECTION, SOLUTION INFILTRATION; PERINEURAL
Status: COMPLETED | OUTPATIENT
Start: 2018-02-06 | End: 2018-02-06

## 2018-02-06 RX ORDER — TRIAMCINOLONE ACETONIDE 40 MG/ML
40 INJECTION, SUSPENSION INTRA-ARTICULAR; INTRAMUSCULAR
Status: COMPLETED | OUTPATIENT
Start: 2018-02-06 | End: 2018-02-06

## 2018-02-06 RX ADMIN — LIDOCAINE HYDROCHLORIDE 4 ML: 10 INJECTION, SOLUTION INFILTRATION; PERINEURAL at 12:18

## 2018-02-06 RX ADMIN — TRIAMCINOLONE ACETONIDE 40 MG: 40 INJECTION, SUSPENSION INTRA-ARTICULAR; INTRAMUSCULAR at 12:19

## 2018-02-06 RX ADMIN — BUPIVACAINE HYDROCHLORIDE 4 ML: 2.5 INJECTION, SOLUTION INFILTRATION; PERINEURAL at 12:18

## 2018-02-06 RX ADMIN — LIDOCAINE HYDROCHLORIDE 4 ML: 10 INJECTION, SOLUTION INFILTRATION; PERINEURAL at 12:19

## 2018-02-06 RX ADMIN — BUPIVACAINE HYDROCHLORIDE 4 ML: 2.5 INJECTION, SOLUTION INFILTRATION; PERINEURAL at 12:19

## 2018-02-06 RX ADMIN — TRIAMCINOLONE ACETONIDE 40 MG: 40 INJECTION, SUSPENSION INTRA-ARTICULAR; INTRAMUSCULAR at 12:18

## 2018-02-06 NOTE — PROGRESS NOTES
CHIEF COMPLAINT:   Chief Complaint   Patient presents with    Knee Pain       HISTORY: Mickie Nichols is a 63 y o  female here for follow-up of left knee pain  She had a corticosteroid injection performed in the left knee approximately 5 and half months ago that worked well for her pain  She denies any new injuries but reports recurrent, diffuse, mild pain in the left knee with occasional swelling  She also has a new complaint today of right knee pain  She reports approximately 1 month of insidious onset of mild pain diffusely in the knee  No trauma  No numbness or tingling  No mechanical symptoms  ROS: Other than what is noted in the history of present illness 12 point review of systems was obtained and was otherwise negative      PROBLEMS:   Patient Active Problem List   Diagnosis    Femoral neck stress fracture    Primary osteoarthritis of left knee    Primary osteoarthritis of right knee       MEDS:   Current Outpatient Prescriptions   Medication Sig Dispense Refill    ALPRAZolam (XANAX) 0 5 mg tablet Take 0 5 mg by mouth as needed for anxiety      Calcium Carbonate (CALCIUM 600) 1500 (600 CA) MG TABS Take by mouth      docusate sodium (COLACE) 100 mg capsule Take 1 capsule by mouth 2 (two) times a day for 15 days 30 capsule 0    levothyroxine (SYNTHROID) 112 mcg tablet Take 112 mcg by mouth daily at bedtime      loratadine (CLARITIN) 10 mg tablet Take 10 mg by mouth daily      ondansetron (ZOFRAN) 4 mg tablet Take 1 tablet by mouth every 8 (eight) hours as needed for nausea or vomiting for up to 30 days 30 tablet 0    Vitamin D, Cholecalciferol, 1000 UNITS TABS Take 10,000 Units by mouth       No current facility-administered medications for this visit          ALLERGIES: Sulfa antibiotics    MEDICAL HISTORY:   Past Medical History:   Diagnosis Date    Anxiety     Cancer ([facility] [address] )     thyroid    Iron deficiency anemia        SOCIAL:   Social History     Social History    Marital status:      Spouse name: N/A    Number of children: N/A    Years of education: N/A     Occupational History    Not on file  Social History Main Topics    Smoking status: Never Smoker    Smokeless tobacco: Never Used    Alcohol use Yes      Comment: moderate    Drug use: No    Sexual activity: Not on file     Other Topics Concern    Not on file     Social History Narrative    No narrative on file       The medications, allergies, and history as listed above were all reviewed by myself during this encounter      PHYSICAL EXAM:  Vitals:   Vitals:    02/06/18 1158   BP: 150/86   BP Location: Right arm   Patient Position: Sitting   Cuff Size: Standard   Pulse: 69   Weight: 72 6 kg (160 lb)   Height: 5' 7" (1 702 m)     Body mass index is 25 06 kg/m²  General:  Alert, no distress    ORTHO EXAM:   Gait:  Normal  No limping  Left knee:  Skin is intact  There is a grade 1 effusion  No joint line tenderness  Knee range of motion is full and painless  Specialty testing was deferred  Motor and sensation are intact throughout  Right knee:  Skin is intact  No joint line tenderness  No effusion  Knee range of motion is full and painless  Motor is intact in the flexion extension  Knee is stable  Sensation is intact to light touch throughout  ASSESSMENT AND PLAN:  Cristal Bates was seen today for knee pain  Diagnoses and all orders for this visit:    Primary osteoarthritis of left knee    Primary osteoarthritis of right knee        There are no Patient Instructions on file for this visit  Return if symptoms worsen or fail to improve        Large joint arthrocentesis  Date/Time: 2/6/2018 12:18 PM  Consent given by: patient  Timeout: Immediately prior to procedure a time out was called to verify the correct patient, procedure, equipment, support staff and site/side marked as required   Supporting Documentation  Indications: pain   Procedure Details  Location: knee - R knee  Preparation: Patient was prepped and draped in the usual sterile fashion  Needle gauge: 21 G  Approach: superolateral   Medications administered: 4 mL bupivacaine 0 25 %; 4 mL lidocaine 1 %; 40 mg triamcinolone acetonide 40 mg/mL    Patient tolerance: patient tolerated the procedure well with no immediate complications  Dressing:  Sterile dressing applied  Large joint arthrocentesis  Date/Time: 2/6/2018 12:19 PM  Consent given by: patient  Timeout: Immediately prior to procedure a time out was called to verify the correct patient, procedure, equipment, support staff and site/side marked as required   Supporting Documentation  Indications: pain   Procedure Details  Location: knee - L knee  Preparation: Patient was prepped and draped in the usual sterile fashion  Needle gauge: 21 G    Approach: superolateral   Medications administered: 4 mL bupivacaine 0 25 %; 4 mL lidocaine 1 %; 40 mg triamcinolone acetonide 40 mg/mL    Patient tolerance: patient tolerated the procedure well with no immediate complications  Dressing:  Sterile dressing applied

## 2018-03-01 DIAGNOSIS — C73 MALIGNANT NEOPLASM OF THYROID GLAND (HCC): ICD-10-CM

## 2018-03-01 DIAGNOSIS — M81.0 OSTEOPOROSIS, UNSPECIFIED OSTEOPOROSIS TYPE, UNSPECIFIED PATHOLOGICAL FRACTURE PRESENCE: Primary | ICD-10-CM

## 2018-03-09 DIAGNOSIS — F41.1 GENERALIZED ANXIETY DISORDER: Primary | ICD-10-CM

## 2018-03-09 RX ORDER — ALPRAZOLAM 0.5 MG/1
0.5 TABLET ORAL 2 TIMES DAILY PRN
Qty: 60 TABLET | Refills: 0 | OUTPATIENT
Start: 2018-03-09 | End: 2018-04-05 | Stop reason: SDUPTHER

## 2018-03-22 DIAGNOSIS — M81.0 OSTEOPOROSIS, UNSPECIFIED OSTEOPOROSIS TYPE, UNSPECIFIED PATHOLOGICAL FRACTURE PRESENCE: ICD-10-CM

## 2018-03-26 DIAGNOSIS — M81.0 OSTEOPOROSIS, UNSPECIFIED OSTEOPOROSIS TYPE, UNSPECIFIED PATHOLOGICAL FRACTURE PRESENCE: ICD-10-CM

## 2018-04-01 PROBLEM — K92.1 HEMATOCHEZIA: Status: ACTIVE | Noted: 2017-11-08

## 2018-04-01 PROBLEM — S32.049A CLOSED L4 VERTEBRAL FRACTURE (HCC): Status: ACTIVE | Noted: 2017-12-07

## 2018-04-01 PROBLEM — K21.9 GERD (GASTROESOPHAGEAL REFLUX DISEASE): Status: ACTIVE | Noted: 2017-11-08

## 2018-04-01 PROBLEM — M41.9 LUMBAR SCOLIOSIS: Status: ACTIVE | Noted: 2017-12-07

## 2018-04-03 ENCOUNTER — OFFICE VISIT (OUTPATIENT)
Dept: FAMILY MEDICINE CLINIC | Facility: CLINIC | Age: 58
End: 2018-04-03
Payer: COMMERCIAL

## 2018-04-03 ENCOUNTER — APPOINTMENT (OUTPATIENT)
Dept: LAB | Facility: CLINIC | Age: 58
End: 2018-04-03
Payer: COMMERCIAL

## 2018-04-03 VITALS
RESPIRATION RATE: 16 BRPM | DIASTOLIC BLOOD PRESSURE: 70 MMHG | WEIGHT: 150 LBS | HEART RATE: 76 BPM | SYSTOLIC BLOOD PRESSURE: 120 MMHG | BODY MASS INDEX: 23.54 KG/M2 | HEIGHT: 67 IN

## 2018-04-03 DIAGNOSIS — E89.0 POSTOPERATIVE HYPOTHYROIDISM: ICD-10-CM

## 2018-04-03 DIAGNOSIS — D50.9 IRON DEFICIENCY ANEMIA, UNSPECIFIED IRON DEFICIENCY ANEMIA TYPE: Primary | ICD-10-CM

## 2018-04-03 DIAGNOSIS — Z12.39 SCREENING FOR BREAST CANCER: ICD-10-CM

## 2018-04-03 DIAGNOSIS — D50.9 IRON DEFICIENCY ANEMIA, UNSPECIFIED IRON DEFICIENCY ANEMIA TYPE: ICD-10-CM

## 2018-04-03 DIAGNOSIS — D64.9 ANEMIA: ICD-10-CM

## 2018-04-03 DIAGNOSIS — G47.09 OTHER INSOMNIA: ICD-10-CM

## 2018-04-03 LAB
BASOPHILS # BLD AUTO: 0.02 THOUSANDS/ΜL (ref 0–0.1)
BASOPHILS NFR BLD AUTO: 0 % (ref 0–1)
EOSINOPHIL # BLD AUTO: 0.04 THOUSAND/ΜL (ref 0–0.61)
EOSINOPHIL NFR BLD AUTO: 1 % (ref 0–6)
ERYTHROCYTE [DISTWIDTH] IN BLOOD BY AUTOMATED COUNT: 13.2 % (ref 11.6–15.1)
HCT VFR BLD AUTO: 40 % (ref 34.8–46.1)
HGB BLD-MCNC: 13.1 G/DL (ref 11.5–15.4)
IRON SERPL-MCNC: 85 UG/DL (ref 50–170)
LYMPHOCYTES # BLD AUTO: 3.1 THOUSANDS/ΜL (ref 0.6–4.47)
LYMPHOCYTES NFR BLD AUTO: 53 % (ref 14–44)
MCH RBC QN AUTO: 32.5 PG (ref 26.8–34.3)
MCHC RBC AUTO-ENTMCNC: 32.8 G/DL (ref 31.4–37.4)
MCV RBC AUTO: 99 FL (ref 82–98)
MONOCYTES # BLD AUTO: 0.46 THOUSAND/ΜL (ref 0.17–1.22)
MONOCYTES NFR BLD AUTO: 8 % (ref 4–12)
NEUTROPHILS # BLD AUTO: 2.21 THOUSANDS/ΜL (ref 1.85–7.62)
NEUTS SEG NFR BLD AUTO: 38 % (ref 43–75)
NRBC BLD AUTO-RTO: 0 /100 WBCS
PLATELET # BLD AUTO: 269 THOUSANDS/UL (ref 149–390)
PMV BLD AUTO: 9 FL (ref 8.9–12.7)
RBC # BLD AUTO: 4.03 MILLION/UL (ref 3.81–5.12)
WBC # BLD AUTO: 5.85 THOUSAND/UL (ref 4.31–10.16)

## 2018-04-03 PROCEDURE — 36415 COLL VENOUS BLD VENIPUNCTURE: CPT

## 2018-04-03 PROCEDURE — 83540 ASSAY OF IRON: CPT

## 2018-04-03 PROCEDURE — 99214 OFFICE O/P EST MOD 30 MIN: CPT | Performed by: FAMILY MEDICINE

## 2018-04-03 PROCEDURE — 85025 COMPLETE CBC W/AUTO DIFF WBC: CPT

## 2018-04-03 RX ORDER — CALCIUM CARBONATE 500(1250)
1 TABLET ORAL DAILY
COMMUNITY
Start: 2015-06-09 | End: 2018-10-09 | Stop reason: SDUPTHER

## 2018-04-03 RX ORDER — PRASTERONE (DHEA) 50 MG
CAPSULE ORAL
COMMUNITY
End: 2018-09-11 | Stop reason: HOSPADM

## 2018-04-03 RX ORDER — ZOLPIDEM TARTRATE 12.5 MG/1
12.5 TABLET, FILM COATED, EXTENDED RELEASE ORAL
Qty: 30 TABLET | Refills: 0 | OUTPATIENT
Start: 2018-04-03 | End: 2018-04-06 | Stop reason: SDUPTHER

## 2018-04-03 RX ORDER — TURMERIC ROOT EXTRACT 500 MG
TABLET ORAL
COMMUNITY
End: 2018-11-07

## 2018-04-03 RX ORDER — MAG HYDROX/ALUMINUM HYD/SIMETH 400-400-40
SUSPENSION, ORAL (FINAL DOSE FORM) ORAL EVERY MORNING
COMMUNITY

## 2018-04-03 RX ORDER — UBIDECARENONE 75 MG
CAPSULE ORAL DAILY
COMMUNITY
End: 2020-01-09

## 2018-04-03 NOTE — PATIENT INSTRUCTIONS
Insomnia   WHAT YOU NEED TO KNOW:   Insomnia is a condition that makes it hard to fall or stay asleep  Lack of sleep can lead to attention or memory problems during the day  You may also be rocha, depressed, clumsy, or have headaches  DISCHARGE INSTRUCTIONS:   Contact your healthcare provider if:   · Your symptoms do not get better, or they get worse  · You begin to use drugs or alcohol to fall asleep  · You have questions or concerns about your condition or care  Medicines:   · Medicines  may help you sleep more regularly or help you feel less anxious  · Take your medicine as directed  Contact your healthcare provider if you think your medicine is not helping or if you have side effects  Tell him or her if you are allergic to any medicine  Keep a list of the medicines, vitamins, and herbs you take  Include the amounts, and when and why you take them  Bring the list or the pill bottles to follow-up visits  Carry your medicine list with you in case of an emergency  What you can do to improve your sleep:   · Create a sleep schedule  This will help you form a sleep routine  Keep a record of your sleep patterns, and any sleeping problems you have  Bring the record to follow-up visits with healthcare providers  · Do not take naps  Naps could make it hard for you to fall asleep at bedtime  · Keep your bedroom cool, quiet, and dark  Turn on white noise, such as a fan, to help you relax  Do not use your bed for any activity that will keep you awake  Do not read, exercise, eat, or watch TV in your bedroom  · Get up if you do not fall asleep within 20 minutes  Move to another room and do something relaxing until you become sleepy  · Limit caffeine, alcohol, and food to earlier in the day  Only drink caffeine in the morning  Do not drink alcohol within 6 hours of bedtime  Do not eat a heavy meal right before you go to bed  · Exercise regularly  Daily exercise may help you sleep better   Do not exercise within 4 hours of bedtime  Follow up with your healthcare provider as directed: Your healthcare provider may refer you for cognitive behavioral therapy  A behavioral therapist may help you find ways to relax, decrease stress, and improve sleep  Write down your questions so you remember to ask them during your visits  © 2017 [employer] Information is for End User's use only and may not be sold, redistributed or otherwise used for commercial purposes  All illustrations and images included in CareNotes® are the copyrighted property of [employer]  or Damien Tamayo  The above information is an  only  It is not intended as medical advice for individual conditions or treatments  Talk to your doctor, nurse or pharmacist before following any medical regimen to see if it is safe and effective for you

## 2018-04-03 NOTE — PROGRESS NOTES
Assessment/Plan:    Other insomnia  Trial ambien CR 12 5 mg    Anemia  Will recheck levels    Postoperative hypothyroidism  tsh very suppressed  Will contact endocrine         Problem List Items Addressed This Visit     Anemia - Primary     Will recheck levels         Relevant Orders    CBC    Iron    Postoperative hypothyroidism     tsh very suppressed  Will contact endocrine         Other insomnia     Trial ambien CR 12 5 mg         Relevant Medications    zolpidem (AMBIEN CR) 12 5 MG CR tablet      Other Visit Diagnoses     Screening for breast cancer                Subjective:      Patient ID: Rhiannon Pearce is a 63 y o  female  Here for increased insomnia  Working out in the morning  Has tried everything  Even taking ambien with xanax and benedryl/   TSH 0 05  Nothing has worked so far        The following portions of the patient's history were reviewed and updated as appropriate: allergies, current medications, past family history, past medical history, past social history, past surgical history and problem list     Review of Systems   Constitutional: Negative  HENT: Negative  Eyes: Negative  Respiratory: Negative  Cardiovascular: Negative  Gastrointestinal: Negative  Endocrine: Negative  Genitourinary: Negative  Musculoskeletal: Negative  Allergic/Immunologic: Negative  Neurological: Negative  Psychiatric/Behavioral: Positive for suicidal ideas  The patient is nervous/anxious  Objective:      /70   Pulse 76   Resp 16   Ht 5' 7" (1 702 m)   Wt 68 kg (150 lb)   BMI 23 49 kg/m²          Physical Exam   Constitutional: She appears well-developed and well-nourished  Eyes: Pupils are equal, round, and reactive to light  Neck: Normal range of motion  Neck supple  Cardiovascular: Normal rate, regular rhythm, normal heart sounds and intact distal pulses  Pulmonary/Chest: Effort normal and breath sounds normal    Abdominal: Soft   Bowel sounds are normal    Musculoskeletal: Normal range of motion  Neurological: She is alert  Skin: Skin is warm and dry  Nursing note and vitals reviewed

## 2018-04-05 DIAGNOSIS — F41.1 GENERALIZED ANXIETY DISORDER: ICD-10-CM

## 2018-04-05 RX ORDER — ALPRAZOLAM 0.5 MG/1
0.5 TABLET ORAL 2 TIMES DAILY PRN
Qty: 60 TABLET | Refills: 3 | OUTPATIENT
Start: 2018-04-05 | End: 2018-04-13 | Stop reason: SDUPTHER

## 2018-04-06 DIAGNOSIS — G47.09 OTHER INSOMNIA: ICD-10-CM

## 2018-04-06 RX ORDER — ZOLPIDEM TARTRATE 12.5 MG/1
12.5 TABLET, FILM COATED, EXTENDED RELEASE ORAL
Qty: 30 TABLET | Refills: 0 | OUTPATIENT
Start: 2018-04-06 | End: 2018-05-09 | Stop reason: SDUPTHER

## 2018-04-09 ENCOUNTER — TRANSCRIBE ORDERS (OUTPATIENT)
Dept: ADMINISTRATIVE | Facility: HOSPITAL | Age: 58
End: 2018-04-09

## 2018-04-09 DIAGNOSIS — Z12.31 ENCOUNTER FOR MAMMOGRAM TO ESTABLISH BASELINE MAMMOGRAM: Primary | ICD-10-CM

## 2018-04-13 ENCOUNTER — OFFICE VISIT (OUTPATIENT)
Dept: OBGYN CLINIC | Facility: CLINIC | Age: 58
End: 2018-04-13
Payer: COMMERCIAL

## 2018-04-13 ENCOUNTER — APPOINTMENT (OUTPATIENT)
Dept: RADIOLOGY | Facility: CLINIC | Age: 58
End: 2018-04-13
Payer: COMMERCIAL

## 2018-04-13 VITALS
HEART RATE: 85 BPM | BODY MASS INDEX: 23.53 KG/M2 | WEIGHT: 149.91 LBS | DIASTOLIC BLOOD PRESSURE: 79 MMHG | HEIGHT: 67 IN | SYSTOLIC BLOOD PRESSURE: 159 MMHG

## 2018-04-13 DIAGNOSIS — M25.561 RIGHT KNEE PAIN, UNSPECIFIED CHRONICITY: ICD-10-CM

## 2018-04-13 DIAGNOSIS — F41.1 GENERALIZED ANXIETY DISORDER: ICD-10-CM

## 2018-04-13 DIAGNOSIS — M17.11 PRIMARY OSTEOARTHRITIS OF RIGHT KNEE: Primary | ICD-10-CM

## 2018-04-13 PROCEDURE — 99214 OFFICE O/P EST MOD 30 MIN: CPT | Performed by: ORTHOPAEDIC SURGERY

## 2018-04-13 PROCEDURE — 73562 X-RAY EXAM OF KNEE 3: CPT

## 2018-04-13 PROCEDURE — 20610 DRAIN/INJ JOINT/BURSA W/O US: CPT | Performed by: ORTHOPAEDIC SURGERY

## 2018-04-13 RX ORDER — METHYLPREDNISOLONE ACETATE 40 MG/ML
1 INJECTION, SUSPENSION INTRA-ARTICULAR; INTRALESIONAL; INTRAMUSCULAR; SOFT TISSUE
Status: COMPLETED | OUTPATIENT
Start: 2018-04-13 | End: 2018-04-13

## 2018-04-13 RX ADMIN — METHYLPREDNISOLONE ACETATE 1 ML: 40 INJECTION, SUSPENSION INTRA-ARTICULAR; INTRALESIONAL; INTRAMUSCULAR; SOFT TISSUE at 08:57

## 2018-04-13 NOTE — PROGRESS NOTES
Patient Name:  Isabela Jackson  MRN:  [id number]    Assessment & Plan    Right knee DJD  1  Steroid injection performed today  2  Activity modification and OTC medication as needed  3  Discussed Synvisc-One injection if pain persists after steroid injection today  Patient will call to arrange as needed  Chief Complaint    Right knee pain      History of the Present Illness    44-year-old female presents with worsening right knee pain for the past couple weeks  She states that the symptoms started while hiking down a hill without a discrete injury episode  Since that time she has had increased pain with prolonged weight-bearing activity  The pain localizes to the anterolateral aspect  She tolerates squatting exercises at the gym  No mechanical symptoms or giving way  She reports a history of lateral meniscus and LCL surgery on her right knee when she was 16years old  Physical Exam    /79   Pulse 85   Ht 5' 7 01" (1 702 m)   Wt 68 kg (149 lb 14 6 oz)   BMI 23 47 kg/m²     Right knee:  No palpable effusion  Tenderness to palpation mid lateral joint line  Range of motion is full  Mild to moderate patellofemoral crepitus  Stable with Lachman test   Stable with varus and valgus stress  Stable with posterior drawer test   Amanda's test is negative  Patellar grind test is negative  Constitutional:  Well-developed and well-nourished  Eyes:  Anicteric sclerae  Neck:  Supple  Lungs:  Unlabored breathing  Cardiovascular:  Capillary refill is less than 2 seconds  Skin:  Intact without erythema  Neurologic:  Sensation intact to light touch  Psychiatric:  Mood and affect are appropriate  Data Review    I have personally reviewed pertinent films in PACS, and my interpretation follows  X-rays right knee 4/13/18:  No acute bony abnormalities  Mild degenerative changes medial compartment with subchondral sclerosis and slight joint space narrowing        Past Medical History:   Diagnosis Date    Anxiety     Cancer [facility])     thyroid    Closed rib fracture     x2    Fall     last assessed 16    Iron deficiency anemia     Viral gastroenteritis     last assessed 12       Past Surgical History:   Procedure Laterality Date    APPENDECTOMY  2006    BREAST SURGERY Left 2001    CATARACT EXTRACTION Left     lens implant     SECTION      CHOLECYSTECTOMY      ESOPHAGOGASTRODUODENOSCOPY      diagnostic    GASTRIC BYPASS      HIP SURGERY Right     HYSTERECTOMY      total abdominal    JOINT REPLACEMENT      KNEE ARTHROSCOPY Bilateral     GA COLONOSCOPY FLX DX W/COLLJ SPEC WHEN PFRMD N/A 2017    Procedure: EGD AND COLONOSCOPY;  Surgeon: Glendy Rogers MD;  [facility];   Service: Gastroenterology    GA FEMORAL [address], OPEN [place] Right 10/10/2016    Procedure: FEMORAL NECK FIXATION ;  Surgeon: Philip Roman MD;  [facility];  Service: Orthopedics    THYROIDECTOMY Bilateral     with LN dissectomy       Allergies   Allergen Reactions    Other     Scopolamine      Reaction Date: 2011;     Sulfa Antibiotics Hives    Sulfamethoxazole-Trimethoprim      Reaction Date: 2011;        Current Outpatient Prescriptions on File Prior to Visit   Medication Sig Dispense Refill    ALPRAZolam (XANAX) 0 5 mg tablet Take 1 tablet (0 5 mg total) by mouth 2 (two) times a day as needed for anxiety for up to 30 days 60 tablet 3    Calcium 500 MG tablet Take 1 tablet by mouth daily      Calcium Carbonate (CALCIUM 600) 1500 (600 CA) MG TABS Take by mouth      Cholecalciferol (VITAMIN D3) 5000 units CAPS Take by mouth      CINNAMON PO Take by mouth      cyanocobalamin (VITAMIN B-12) 100 mcg tablet Take by mouth      docusate sodium (COLACE) 100 mg capsule Take 1 capsule by mouth 2 (two) times a day for 15 days 30 capsule 0    Ginger 500 MG CAPS Take by mouth      Ginger, Zingiber officinalis, (GINGER ROOT) 250 MG CAPS Take by mouth  Insulin Pen Needle (BD PEN NEEDLE WILLIAM U/F) 32G X 4 MM MISC by Does not apply route daily for 100 days 100 each 1    Insulin Pen Needle (BD PEN NEEDLE WILLIAM U/F) 32G X 4 MM MISC by Does not apply route      levothyroxine (SYNTHROID) 112 mcg tablet Take 112 mcg by mouth daily at bedtime      loratadine (CLARITIN) 10 mg tablet Take 10 mg by mouth daily      ondansetron (ZOFRAN) 4 mg tablet Take 1 tablet by mouth every 8 (eight) hours as needed for nausea or vomiting for up to 30 days 30 tablet 0    teriparatide (FORTEO) 600 MCG/2 4ML injection Inject 0 08 mL (20 mcg total) under the skin daily for 30 days 2 4 mL 5    Turmeric 500 MG TABS Take by mouth      Vitamin D, Cholecalciferol, 1000 UNITS TABS Take 10,000 Units by mouth      zolpidem (AMBIEN CR) 12 5 MG CR tablet Take 1 tablet (12 5 mg total) by mouth daily at bedtime as needed for sleep 30 tablet 0     No current facility-administered medications on file prior to visit  Social History   Substance Use Topics    Smoking status: Never Smoker    Smokeless tobacco: Never Used    Alcohol use Yes      Comment: moderate,social       Family History   Problem Relation Age of Onset    Heart disease Mother      cardiac disorder    Diabetes Mother     Cancer Father      of mouth    Bone cancer Maternal Grandfather     Brain cancer Maternal Grandfather     Lung cancer Maternal Grandfather     Skin cancer Maternal Grandfather     Coronary artery disease Family     Osteoporosis Family     Rheum arthritis Family     Breast cancer Cousin     Melanoma Cousin     Lung cancer Paternal Aunt     Throat cancer Brother          Review of Systems    General:  Negative for fever, lethargy/malaise, or night sweats  Eyes:  Negative for blurry vision or double vision  ENT:  Negative for hearing change, nasal discharge, or sore throat  Hematological:  Negative for bleeding problems or blood clots    Endocrine:  Negative for excessive thirst or temperature intolerance  Respiratory:  Negative for cough or wheezing  Cardiovascular:  Negative for chest pain, dyspnea on exertion, or palpitations  Gastrointestinal:  Negative for abdominal pain, diarrhea, or nausea/vomiting  Musculoskeletal:  As stated in the HPI and otherwise negative  Neurological:  Negative for confusion, headaches, or seizures  Psychological:  Negative for hallucinations or mood swings  Dermatological:  Negative for itching or rash        Large joint arthrocentesis  Date/Time: 4/13/2018 8:57 AM  Consent given by: patient  Site marked: site marked  Supporting Documentation  Indications: pain   Procedure Details  Location: knee - R knee  Needle size: 22 G  Approach: anterolateral  Medications administered: 1 mL methylPREDNISolone acetate 40 mg/mL    Patient tolerance: patient tolerated the procedure well with no immediate complications  Dressing:  Sterile dressing applied

## 2018-04-18 ENCOUNTER — TELEPHONE (OUTPATIENT)
Dept: OBGYN CLINIC | Facility: HOSPITAL | Age: 58
End: 2018-04-18

## 2018-04-18 RX ORDER — ALPRAZOLAM 0.5 MG/1
0.5 TABLET ORAL 2 TIMES DAILY PRN
Qty: 60 TABLET | Refills: 0 | Status: SHIPPED | OUTPATIENT
Start: 2018-04-18 | End: 2018-05-04 | Stop reason: SDUPTHER

## 2018-04-18 NOTE — TELEPHONE ENCOUNTER
Dr Flex Saab    Seen on 4 13 18 received a cortisone injection in R knee  You told patient if it is not better by Monday then to let you know      She is asking for a call back at [phone]

## 2018-04-18 NOTE — TELEPHONE ENCOUNTER
I advised patient to hold off on the gym for the rest of the week, ice and elevate as much as she can while at work  Tylenol 1000mg alternating with NSAID with food  She is experiencing R knee pain going down stairs not upstairs, has crepidus on the lateral side of R knee  I told her that she may need a little more time for the shot to work  Please advise next step

## 2018-04-18 NOTE — TELEPHONE ENCOUNTER
Kiel Verdin, will you please apply for Synvisc one for patient's right knee? She sees Dr Marcela Lainez    thanks

## 2018-04-20 ENCOUNTER — TELEPHONE (OUTPATIENT)
Dept: ENDOCRINOLOGY | Facility: CLINIC | Age: 58
End: 2018-04-20

## 2018-04-20 ENCOUNTER — HOSPITAL ENCOUNTER (OUTPATIENT)
Dept: RADIOLOGY | Facility: MEDICAL CENTER | Age: 58
Discharge: HOME | End: 2018-04-20
Payer: COMMERCIAL

## 2018-04-20 DIAGNOSIS — Z12.31 ENCOUNTER FOR MAMMOGRAM TO ESTABLISH BASELINE MAMMOGRAM: ICD-10-CM

## 2018-04-20 DIAGNOSIS — C73 MALIGNANT NEOPLASM OF THYROID GLAND (HCC): ICD-10-CM

## 2018-04-20 PROCEDURE — 77067 SCR MAMMO BI INCL CAD: CPT

## 2018-04-20 PROCEDURE — 76536 US EXAM OF HEAD AND NECK: CPT

## 2018-04-20 PROCEDURE — 77063 BREAST TOMOSYNTHESIS BI: CPT

## 2018-04-20 NOTE — TELEPHONE ENCOUNTER
[facility] has called to verify patient's phone number they have on file is correct, they have been trying to reach her on numerous occasions dating from 4/10 through 4/20 to set up delivery for Forteo     Number they have been calling is the same we have on file

## 2018-04-30 ENCOUNTER — OFFICE VISIT (OUTPATIENT)
Dept: ENDOCRINOLOGY | Facility: CLINIC | Age: 58
End: 2018-04-30
Payer: COMMERCIAL

## 2018-04-30 VITALS
SYSTOLIC BLOOD PRESSURE: 124 MMHG | WEIGHT: 156.2 LBS | DIASTOLIC BLOOD PRESSURE: 88 MMHG | BODY MASS INDEX: 24.52 KG/M2 | HEIGHT: 67 IN | HEART RATE: 66 BPM

## 2018-04-30 DIAGNOSIS — Z98.84 BARIATRIC SURGERY STATUS: ICD-10-CM

## 2018-04-30 DIAGNOSIS — E89.0 POSTOPERATIVE HYPOTHYROIDISM: ICD-10-CM

## 2018-04-30 DIAGNOSIS — E55.9 VITAMIN D DEFICIENCY: ICD-10-CM

## 2018-04-30 DIAGNOSIS — M81.0 OSTEOPOROSIS, UNSPECIFIED OSTEOPOROSIS TYPE, UNSPECIFIED PATHOLOGICAL FRACTURE PRESENCE: Primary | ICD-10-CM

## 2018-04-30 DIAGNOSIS — C73 PAPILLARY CARCINOMA OF THYROID (HCC): ICD-10-CM

## 2018-04-30 PROCEDURE — 99214 OFFICE O/P EST MOD 30 MIN: CPT | Performed by: INTERNAL MEDICINE

## 2018-04-30 RX ORDER — LEVOTHYROXINE SODIUM 112 MCG
112 TABLET ORAL DAILY
Qty: 90 TABLET | Refills: 3 | Status: SHIPPED | OUTPATIENT
Start: 2018-04-30 | End: 2018-06-27 | Stop reason: SDUPTHER

## 2018-04-30 NOTE — ASSESSMENT & PLAN NOTE
Status post total thyroidectomy and radioactive iodine ablation in 2000  No evidence of recurrence    Will continue to monitor

## 2018-04-30 NOTE — ASSESSMENT & PLAN NOTE
Severe osteoporosis with history of prior fractures  Patient with multiple risk factors including post gastrectomy mal absorption, high postsurgical hypothyroidism, postmenopausal etc   She was supposed to restart Forteo last visit however was not started as her insurance changed  She is waiting to hear back-I have not received any paperwork from insurance  She will check on that and if Forteo is not covered will start her on Prolia  Continue calcium and vitamin-D supplementations  Will check vitamin-D 25 hydroxy as well as 24 hour urine calcium    If 24 hour urine calcium is low she will need an increase  In her calcium dose

## 2018-04-30 NOTE — PROGRESS NOTES
Jd Contreras 63 y o  female MRN: [id number]    Encounter: [id number]      Assessment/Plan     Problem List Items Addressed This Visit     Osteoporosis - Primary     Severe osteoporosis with history of prior fractures  Patient with multiple risk factors including post gastrectomy mal absorption, high postsurgical hypothyroidism, postmenopausal etc   She was supposed to restart Forteo last visit however was not started as her insurance changed  She is waiting to hear back-I have not received any paperwork from insurance  She will check on that and if Forteo is not covered will start her on Prolia  Continue calcium and vitamin-D supplementations  Will check vitamin-D 25 hydroxy as well as 24 hour urine calcium  If 24 hour urine calcium is low she will need an increase  In her calcium dose         Relevant Orders    Calcium, urine, 24 hour Lab Collect    Creatinine, urine, 24 hour Lab Collect    TSH, 3rd generation Lab Collect    Vitamin D 25 hydroxy Lab Collect    PTH, intact Lab Collect Lab Collect    DXA bone density spine hip and pelvis    Papillary carcinoma of thyroid ([facility] [address] )     Status post total thyroidectomy and radioactive iodine ablation in 2000  No evidence of recurrence  Will continue to monitor         Relevant Medications    SYNTHROID 112 MCG tablet    Other Relevant Orders    TGAB+THYROGLOBULIN,DANIELA OR LCMS    Postoperative hypothyroidism     Will aim to normalize the TSH    Repeat thyroid function test including TSH and free T4         Relevant Medications    SYNTHROID 112 MCG tablet    Other Relevant Orders    T4, free Lab Collect    TSH, 3rd generation Lab Collect    DXA bone density spine hip and pelvis    Vitamin D deficiency    Relevant Orders    Vitamin D 25 hydroxy Lab Collect    DXA bone density spine hip and pelvis      Other Visit Diagnoses     Bariatric surgery status        Relevant Orders    DXA bone density spine hip and pelvis        CC:   Thyroid cancer/postsurgical hypothyroidism and osteoporosis    History of Present Illness      HPI:  61-year-old woman with history of thyroid cancer, postsurgical hypothyroidism, osteoporosis, status post bariatric surgery here for follow-up  For post surgical hypothyroidism she is currently on Synthroid 112 mcg 1 tab daily -  Morning   Complains of Sleep disturbances , Synthroid dose was decreased about a month back as TSH was low  No palpitations   No constipation     Severe osteoporosis with history of prior fractures  Denies any recent falls, is steady on her feet  Calcium - 1000 mg supplementations , 1-2 servings of dairy ,  Vitamin d3 5000 iu daily   forteo - 18 months - had reclast in 2016-  Plan is to restart forteo - insurance pending   Has history of bariatric surgery 15 years back        Review of Systems   Constitutional: Positive for fatigue  Negative for unexpected weight change  Eyes: Negative for visual disturbance  Respiratory: Negative for cough and shortness of breath  Cardiovascular: Negative for chest pain, palpitations and leg swelling  Gastrointestinal: Negative for constipation, diarrhea, nausea and vomiting  Skin: Negative for color change, pallor, rash and wound  Psychiatric/Behavioral: Positive for sleep disturbance  Negative for behavioral problems and confusion  All other systems reviewed and are negative        Historical Information   Past Medical History:   Diagnosis Date    Anxiety     Cancer [facility])     thyroid    Closed rib fracture     x2    Fall     last assessed 16    Iron deficiency anemia     Viral gastroenteritis     last assessed 12     Past Surgical History:   Procedure Laterality Date    APPENDECTOMY  2006    BREAST SURGERY Left 2001    CATARACT EXTRACTION Left     lens implant     SECTION      CHOLECYSTECTOMY      ESOPHAGOGASTRODUODENOSCOPY      diagnostic    GASTRIC BYPASS      HIP SURGERY Right     HYSTERECTOMY      total abdominal    JOINT REPLACEMENT      KNEE ARTHROSCOPY Bilateral     LA COLONOSCOPY FLX DX W/COLLJ SPEC WHEN PFRMD N/A 11/21/2017    Procedure: EGD AND COLONOSCOPY;  Surgeon: Jose Kaplan MD;  [facility];   Service: Gastroenterology    LA FEMORAL FX, OPEN TX Right 10/10/2016    Procedure: FEMORAL NECK FIXATION ;  Surgeon: Linda Ramon MD;  [facility];  Service: Orthopedics    THYROIDECTOMY Bilateral     with LN dissectomy     Social History   History   Alcohol Use    Yes     Comment: moderate,social     History   Drug Use No     History   Smoking Status    Never Smoker   Smokeless Tobacco    Never Used     Family History:   Family History   Problem Relation Age of Onset    Heart disease Mother      cardiac disorder    Diabetes Mother     Cancer Father      of mouth    Bone cancer Maternal Grandfather     Brain cancer Maternal Grandfather     Lung cancer Maternal Grandfather     Skin cancer Maternal Grandfather     Coronary artery disease Family     Osteoporosis Family     Rheum arthritis Family     Breast cancer Cousin     Melanoma Cousin     Lung cancer Paternal Aunt     Throat cancer Brother        Meds/Allergies   Current Outpatient Prescriptions   Medication Sig Dispense Refill    ALPRAZolam (XANAX) 0 5 mg tablet Take 1 tablet (0 5 mg total) by mouth 2 (two) times a day as needed for anxiety for up to 30 days 60 tablet 0    Calcium Carbonate (CALCIUM 600) 1500 (600 CA) MG TABS Take by mouth      Cholecalciferol (VITAMIN D3) 5000 units CAPS Take by mouth      cyanocobalamin (VITAMIN B-12) 100 mcg tablet Take by mouth      Ginger, Zingiber officinalis, (GINGER ROOT) 250 MG CAPS Take by mouth      loratadine (CLARITIN) 10 mg tablet Take 10 mg by mouth daily      Turmeric 500 MG TABS Take by mouth      zolpidem (AMBIEN CR) 12 5 MG CR tablet Take 1 tablet (12 5 mg total) by mouth daily at bedtime as needed for sleep 30 tablet 0    Calcium 500 MG tablet Take 1 tablet by mouth daily      CINNAMON PO Take by mouth      Yissel 500 MG CAPS Take by mouth      Insulin Pen Needle (BD PEN NEEDLE WILLIAM U/F) 32G X 4 MM MISC by Does not apply route daily for 100 days 100 each 1    Insulin Pen Needle (BD PEN NEEDLE WILLIAM U/F) 32G X 4 MM MISC by Does not apply route      SYNTHROID 112 MCG tablet Take 1 tablet (112 mcg total) by mouth daily 90 tablet 3    teriparatide (FORTEO) 600 MCG/2 4ML injection Inject 0 08 mL (20 mcg total) under the skin daily for 30 days 2 4 mL 5     No current facility-administered medications for this visit  Allergies   Allergen Reactions    Other     Scopolamine      Reaction Date: 10Aug2011;     Sulfa Antibiotics Hives    Sulfamethoxazole-Trimethoprim      Reaction Date: 10Aug2011;        Objective   Vitals: Blood pressure 124/88, pulse 66, height 5' 7" (1 702 m), weight 70 9 kg (156 lb 3 2 oz)  Physical Exam   Constitutional: She is oriented to person, place, and time  She appears well-developed and well-nourished  No distress  HENT:   Head: Normocephalic and atraumatic  Mouth/Throat: No oropharyngeal exudate  Eyes: Conjunctivae and EOM are normal  No scleral icterus  Neck: Normal range of motion  Neck supple  Anterior neck surgical scar-no masses   Cardiovascular: Normal rate, regular rhythm and normal heart sounds  No murmur heard  Pulmonary/Chest: Effort normal and breath sounds normal  No respiratory distress  She has no wheezes  She has no rales  Abdominal: Soft  Bowel sounds are normal  She exhibits no distension  There is no tenderness  There is no rebound  Musculoskeletal: Normal range of motion  She exhibits no edema or deformity  Lymphadenopathy:     She has no cervical adenopathy  Neurological: She is alert and oriented to person, place, and time  Skin: Skin is warm and dry  No rash noted  No erythema  No pallor  Psychiatric: She has a normal mood and affect   Her behavior is normal  Judgment and thought content normal  The history was obtained from the review of the chart, patient  Lab Results:   Lab Results   Component Value Date/Time    TSH 3RD GENERATON 0 012 (L) 10/20/2017 07:23 AM    Free T4 1 77 (H) 10/20/2017 07:23 AM     Imaging Studies:  Results for orders placed during the hospital encounter of 04/20/18   US head neck lymph node mapping    Impression No suspicious cervical lymphadenopathy detected  Workstation performed: [id number]            I have personally reviewed pertinent reports  Portions of the record may have been created with voice recognition software  Occasional wrong word or "sound a like" substitutions may have occurred due to the inherent limitations of voice recognition software  Read the chart carefully and recognize, using context, where substitutions have occurred

## 2018-05-03 ENCOUNTER — TELEPHONE (OUTPATIENT)
Dept: OBGYN CLINIC | Facility: HOSPITAL | Age: 58
End: 2018-05-03

## 2018-05-03 NOTE — TELEPHONE ENCOUNTER
LEFT VOICEMAIL FOR PATIENT TO RETURN MY CALL SO SHE CAN BE SCHEDULED FOR HER SYNVISC-ONE INJECTIONS

## 2018-05-04 DIAGNOSIS — F41.1 GENERALIZED ANXIETY DISORDER: ICD-10-CM

## 2018-05-04 RX ORDER — ALPRAZOLAM 0.5 MG/1
0.5 TABLET ORAL 2 TIMES DAILY PRN
Qty: 60 TABLET | Refills: 0 | OUTPATIENT
Start: 2018-05-04 | End: 2018-07-22 | Stop reason: SDUPTHER

## 2018-05-09 ENCOUNTER — TELEPHONE (OUTPATIENT)
Dept: FAMILY MEDICINE CLINIC | Facility: CLINIC | Age: 58
End: 2018-05-09

## 2018-05-09 DIAGNOSIS — G47.09 OTHER INSOMNIA: ICD-10-CM

## 2018-05-09 RX ORDER — ZOLPIDEM TARTRATE 12.5 MG/1
12.5 TABLET, FILM COATED, EXTENDED RELEASE ORAL
Qty: 30 TABLET | Refills: 5 | Status: SHIPPED | OUTPATIENT
Start: 2018-05-09 | End: 2018-08-15 | Stop reason: ALTCHOICE

## 2018-05-09 RX ORDER — ZOLPIDEM TARTRATE 12.5 MG/1
12.5 TABLET, FILM COATED, EXTENDED RELEASE ORAL
Qty: 30 TABLET | Refills: 5 | OUTPATIENT
Start: 2018-05-09 | End: 2018-05-09 | Stop reason: SDUPTHER

## 2018-05-17 ENCOUNTER — TRANSCRIBE ORDERS (OUTPATIENT)
Dept: LAB | Facility: CLINIC | Age: 58
End: 2018-05-17

## 2018-05-18 ENCOUNTER — OFFICE VISIT (OUTPATIENT)
Dept: OBGYN CLINIC | Facility: CLINIC | Age: 58
End: 2018-05-18
Payer: COMMERCIAL

## 2018-05-18 VITALS
SYSTOLIC BLOOD PRESSURE: 131 MMHG | WEIGHT: 156 LBS | HEIGHT: 67 IN | HEART RATE: 68 BPM | DIASTOLIC BLOOD PRESSURE: 75 MMHG | BODY MASS INDEX: 24.48 KG/M2

## 2018-05-18 DIAGNOSIS — M17.11 PRIMARY OSTEOARTHRITIS OF RIGHT KNEE: Primary | ICD-10-CM

## 2018-05-18 PROCEDURE — 20610 DRAIN/INJ JOINT/BURSA W/O US: CPT | Performed by: ORTHOPAEDIC SURGERY

## 2018-05-18 RX ORDER — PREDNISONE 10 MG/1
10 TABLET ORAL 2 TIMES DAILY
Refills: 1 | COMMUNITY
Start: 2018-04-22 | End: 2018-09-11 | Stop reason: HOSPADM

## 2018-05-18 NOTE — PROGRESS NOTES
Patient Name:  Kevin Desir  MR#:  [id number]    [address] [place] [address]    Follow-up as needed if pain persists  Subjective    The patient presents for a hyaluronic acid injection  Objective    No excessive tenderness, soft tissue swelling, or erythema at the injection site        Large joint arthrocentesis  Date/Time: 5/18/2018 12:01 PM  Consent given by: patient  Site marked: site marked  Supporting Documentation  Indications: pain   Procedure Details  Location: knee - R knee  Needle size: 20 G  Approach: anterolateral  Medications administered: 48 mg hylan 48 MG/6ML    Patient tolerance: patient tolerated the procedure well with no immediate complications  Dressing:  Sterile dressing applied

## 2018-05-22 ENCOUNTER — HOSPITAL ENCOUNTER (OUTPATIENT)
Dept: RADIOLOGY | Age: 58
Discharge: HOME | End: 2018-05-22
Payer: COMMERCIAL

## 2018-05-22 DIAGNOSIS — Z98.84 BARIATRIC SURGERY STATUS: ICD-10-CM

## 2018-05-22 DIAGNOSIS — E89.0 POSTOPERATIVE HYPOTHYROIDISM: ICD-10-CM

## 2018-05-22 DIAGNOSIS — E55.9 VITAMIN D DEFICIENCY: ICD-10-CM

## 2018-05-22 DIAGNOSIS — M81.0 OSTEOPOROSIS, UNSPECIFIED OSTEOPOROSIS TYPE, UNSPECIFIED PATHOLOGICAL FRACTURE PRESENCE: ICD-10-CM

## 2018-05-22 PROCEDURE — 77080 DXA BONE DENSITY AXIAL: CPT

## 2018-05-29 ENCOUNTER — TELEPHONE (OUTPATIENT)
Dept: ENDOCRINOLOGY | Facility: CLINIC | Age: 58
End: 2018-05-29

## 2018-05-29 NOTE — PROGRESS NOTES
Please call the patient regarding her abnormal result  Bone mineral density-unchanged    Continue calcium, vitamin-D supplementations, continue Prolia

## 2018-05-29 NOTE — TELEPHONE ENCOUNTER
----- Message from Marlene Stauffer MD sent at 5/29/2018 11:45 AM EDT -----  Please call the patient regarding her abnormal result  Bone mineral density-unchanged    Continue calcium, vitamin-D supplementations, continue Prolia

## 2018-05-29 NOTE — TELEPHONE ENCOUNTER
----- Message from Donte Lua MD sent at 5/29/2018 11:45 AM EDT -----  Please call the patient regarding her abnormal result  Bone mineral density-unchanged    Continue calcium, vitamin-D supplementations, continue Prolia

## 2018-06-01 ENCOUNTER — TRANSCRIBE ORDERS (OUTPATIENT)
Dept: LAB | Facility: CLINIC | Age: 58
End: 2018-06-01

## 2018-06-01 ENCOUNTER — LAB (OUTPATIENT)
Dept: LAB | Facility: CLINIC | Age: 58
End: 2018-06-01
Payer: COMMERCIAL

## 2018-06-01 DIAGNOSIS — M81.0 OSTEOPOROSIS, UNSPECIFIED OSTEOPOROSIS TYPE, UNSPECIFIED PATHOLOGICAL FRACTURE PRESENCE: ICD-10-CM

## 2018-06-01 DIAGNOSIS — C73 PAPILLARY CARCINOMA OF THYROID (HCC): ICD-10-CM

## 2018-06-01 DIAGNOSIS — C73 MALIGNANT NEOPLASM OF THYROID GLAND (HCC): Primary | ICD-10-CM

## 2018-06-01 DIAGNOSIS — E89.0 POSTOPERATIVE HYPOTHYROIDISM: ICD-10-CM

## 2018-06-01 DIAGNOSIS — E55.9 VITAMIN D DEFICIENCY: ICD-10-CM

## 2018-06-01 LAB
25(OH)D3 SERPL-MCNC: 27.8 NG/ML (ref 30–100)
CALCIUM 24H UR-MCNC: 167.9 MG/24 HRS (ref 42–353)
CREAT 24H UR-MRATE: 0.9 G/24HR (ref 0.6–1.8)
PTH-INTACT SERPL-MCNC: 59.7 PG/ML (ref 18.4–80.1)
SPECIMEN VOL UR: 2300 ML
SPECIMEN VOL UR: 2300 ML
T4 FREE SERPL-MCNC: 1.19 NG/DL (ref 0.76–1.46)
TSH SERPL DL<=0.05 MIU/L-ACNC: 3.64 UIU/ML (ref 0.36–3.74)

## 2018-06-01 PROCEDURE — 82306 VITAMIN D 25 HYDROXY: CPT

## 2018-06-01 PROCEDURE — 86800 THYROGLOBULIN ANTIBODY: CPT

## 2018-06-01 PROCEDURE — 36415 COLL VENOUS BLD VENIPUNCTURE: CPT

## 2018-06-01 PROCEDURE — 82570 ASSAY OF URINE CREATININE: CPT

## 2018-06-01 PROCEDURE — 84439 ASSAY OF FREE THYROXINE: CPT

## 2018-06-01 PROCEDURE — 83970 ASSAY OF PARATHORMONE: CPT

## 2018-06-01 PROCEDURE — 82340 ASSAY OF CALCIUM IN URINE: CPT

## 2018-06-01 PROCEDURE — 84443 ASSAY THYROID STIM HORMONE: CPT

## 2018-06-02 LAB — THYROGLOB AB SERPL-ACNC: 3.3 IU/ML (ref 0–0.9)

## 2018-06-04 ENCOUNTER — TELEPHONE (OUTPATIENT)
Dept: ENDOCRINOLOGY | Facility: CLINIC | Age: 58
End: 2018-06-04

## 2018-06-04 NOTE — TELEPHONE ENCOUNTER
----- Message from Chance Corona MD sent at 6/2/2018  6:59 PM EDT -----  Please call the patient regarding her abnormal result  tg antibodies slightly elevated and stable

## 2018-06-11 ENCOUNTER — TELEPHONE (OUTPATIENT)
Dept: ENDOCRINOLOGY | Facility: CLINIC | Age: 58
End: 2018-06-11

## 2018-06-11 NOTE — PROGRESS NOTES
Please call the patient regarding her abnormal result   Vitamin D is low - please confirm current dose of vitamin d

## 2018-06-11 NOTE — TELEPHONE ENCOUNTER
----- Message from Gabrielle James MD sent at 6/10/2018 10:57 PM EDT -----  Please call the patient regarding her abnormal result   Vitamin D is low - please confirm current dose of vitamin d

## 2018-06-18 DIAGNOSIS — G47.09 OTHER INSOMNIA: ICD-10-CM

## 2018-06-18 RX ORDER — ZOLPIDEM TARTRATE 12.5 MG/1
12.5 TABLET, FILM COATED, EXTENDED RELEASE ORAL
Qty: 30 TABLET | Refills: 0 | Status: CANCELLED | OUTPATIENT
Start: 2018-06-18

## 2018-06-27 DIAGNOSIS — E89.0 POSTOPERATIVE HYPOTHYROIDISM: ICD-10-CM

## 2018-06-27 RX ORDER — LEVOTHYROXINE SODIUM 112 MCG
TABLET ORAL
Qty: 102 TABLET | Refills: 2 | Status: SHIPPED | OUTPATIENT
Start: 2018-06-27 | End: 2018-12-14 | Stop reason: DRUGHIGH

## 2018-07-03 ENCOUNTER — OFFICE VISIT (OUTPATIENT)
Dept: OBGYN CLINIC | Facility: CLINIC | Age: 58
End: 2018-07-03
Payer: COMMERCIAL

## 2018-07-03 ENCOUNTER — APPOINTMENT (OUTPATIENT)
Dept: RADIOLOGY | Facility: CLINIC | Age: 58
End: 2018-07-03
Payer: COMMERCIAL

## 2018-07-03 VITALS
DIASTOLIC BLOOD PRESSURE: 84 MMHG | BODY MASS INDEX: 24.42 KG/M2 | WEIGHT: 155.6 LBS | HEART RATE: 67 BPM | SYSTOLIC BLOOD PRESSURE: 123 MMHG | HEIGHT: 67 IN

## 2018-07-03 DIAGNOSIS — M25.562 LEFT KNEE PAIN, UNSPECIFIED CHRONICITY: ICD-10-CM

## 2018-07-03 DIAGNOSIS — M17.12 PRIMARY OSTEOARTHRITIS OF LEFT KNEE: Primary | ICD-10-CM

## 2018-07-03 PROCEDURE — 73562 X-RAY EXAM OF KNEE 3: CPT

## 2018-07-03 PROCEDURE — 99213 OFFICE O/P EST LOW 20 MIN: CPT | Performed by: PHYSICIAN ASSISTANT

## 2018-07-03 PROCEDURE — 20610 DRAIN/INJ JOINT/BURSA W/O US: CPT | Performed by: PHYSICIAN ASSISTANT

## 2018-07-03 RX ORDER — TRIAMCINOLONE ACETONIDE 40 MG/ML
40 INJECTION, SUSPENSION INTRA-ARTICULAR; INTRAMUSCULAR
Status: COMPLETED | OUTPATIENT
Start: 2018-07-03 | End: 2018-07-03

## 2018-07-03 RX ORDER — BUPIVACAINE HYDROCHLORIDE 5 MG/ML
6 INJECTION, SOLUTION EPIDURAL; INTRACAUDAL
Status: COMPLETED | OUTPATIENT
Start: 2018-07-03 | End: 2018-07-03

## 2018-07-03 RX ADMIN — TRIAMCINOLONE ACETONIDE 40 MG: 40 INJECTION, SUSPENSION INTRA-ARTICULAR; INTRAMUSCULAR at 12:53

## 2018-07-03 RX ADMIN — BUPIVACAINE HYDROCHLORIDE 6 ML: 5 INJECTION, SOLUTION EPIDURAL; INTRACAUDAL at 12:53

## 2018-07-03 NOTE — PROGRESS NOTES
Assessment/Plan:  1  Primary osteoarthritis of left knee  Large joint arthrocentesis   2  Left knee pain, unspecified chronicity  XR knee 3 vw left non injury    Large joint arthrocentesis     Large joint arthrocentesis  Date/Time: 7/3/2018 12:53 PM  Consent given by: patient  Site marked: site marked  Timeout: Immediately prior to procedure a time out was called to verify the correct patient, procedure, equipment, support staff and site/side marked as required   Supporting Documentation  Indications: pain   Procedure Details  Location: knee - L knee  Preparation: Patient was prepped and draped in the usual sterile fashion  Needle size: 20 G  Ultrasound guidance: no  Approach: anterolateral  Medications administered: 6 mL bupivacaine (PF) 0 5 %; 40 mg triamcinolone acetonide 40 mg/mL    Patient tolerance: patient tolerated the procedure well with no immediate complications  Dressing:  Sterile dressing applied      After the risks and benefits of the left knee injection were explained, and verbal consent was obtained for the injection  The left knee was prepped using aseptic technique using isopropyl alcohol and betadine solution  The left knee was successfully injected with 6cc of 0 5% marcaine without epinephrine and 2cc of Kenalog 40 suspension using a 20 gauge needle  After the injection, hemostasis was achieved, and a dressing was applied  Post-injection icing and activity modification instructions were provided  The patient tolerated the procedure well  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 attributable to her mild to moderate underlying osteoarthritis, most significant in the patellofemoral compartment  She previously has done very well with kenalog injections, and consented to a repeat left knee injection today, which she tolerated well without complication   She does understand to avoid strenuous Crossfit activities such as lunges, squats, and deep knee flexion, as this will likely aggravated her patellofemoral arthritis  If she fails to see relief from cortisone, we could escalate care in the form of Synvisc, as she successfully underwent a Synvisc 1 injection with Dr Papito Johansen for her right knee recently  She can follow up with us in 3-4 months as needed pending the efficacy of today's injection  All questions were answered  Subjective: Left knee pain and osteoarthritis    Patient ID: David Jacobs is a 63 y o  female  Imani Ramirez is a pleasant 63year old female presenting to our office for activity related left knee pain  She underwent an arthroscopy for a torn meniscus in 1989 and reports that the knee has been problematic ever since  She has undergone periodic steroid injections, which typically last between 12-18 months  She was under the care of Dr Evette Coronado and more recently Dr Papito Johansen for her right knee, but has not had a left knee injection since last July or August  She is very active and participates in Crossfit in addition to 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  She denies any locking, buckling, or giving way  She does not use assistive devices  Tylenol does not help  She has never had viscosupplementation in the left knee  The right knee Synvisc 1 injection from May has helped  She denies any parasthesias  Review of Systems   Constitutional: Negative  HENT: Negative  Eyes: Negative  Respiratory: Negative  Cardiovascular: Negative  Gastrointestinal: Negative  Endocrine: Negative  Genitourinary: Negative  Musculoskeletal: Positive for arthralgias, joint swelling and myalgias  Skin: Negative  Allergic/Immunologic: Negative  Neurological: Negative  Hematological: Negative  Psychiatric/Behavioral: Negative            Past Medical History:   Diagnosis Date    Anxiety     Cancer [facility])     thyroid    Closed rib fracture     x2    Fall     last assessed 5/14/16    Iron deficiency anemia     Viral gastroenteritis     last assessed 12       Past Surgical History:   Procedure Laterality Date    APPENDECTOMY  2006    BREAST SURGERY Left 2001    CATARACT EXTRACTION Left     lens implant     SECTION      CHOLECYSTECTOMY      ESOPHAGOGASTRODUODENOSCOPY      diagnostic    GASTRIC BYPASS      HIP SURGERY Right     HYSTERECTOMY      total abdominal    JOINT REPLACEMENT      KNEE ARTHROSCOPY Bilateral     [place] COLONOSCOPY FLX DX W/COLLJ SPEC WHEN PFRMD N/A 2017    Procedure: EGD AND COLONOSCOPY;  Surgeon: Edi Ny MD;  [facility]; Service: Gastroenterology    [place] FEMORAL FX, OPEN TX Right 10/10/2016    Procedure: FEMORAL NECK FIXATION ;  Surgeon: Diallo Sanchez MD;  [facility];  Service: Orthopedics    THYROIDECTOMY Bilateral     with LN dissectomy       Family History   Problem Relation Age of Onset    Heart disease Mother         cardiac disorder    Diabetes Mother     Cancer Father         of mouth    Bone cancer Maternal Grandfather     Brain cancer Maternal Grandfather     Lung cancer Maternal Grandfather     Skin cancer Maternal Grandfather     Coronary artery disease Family     Osteoporosis Family     Rheum arthritis Family     Breast cancer Cousin     Melanoma Cousin     Lung cancer Paternal Aunt     Throat cancer Brother        Social History     Occupational History    Not on file       Social History Main Topics    Smoking status: Never Smoker    Smokeless tobacco: Never Used    Alcohol use Yes      Comment: moderate,social    Drug use: No    Sexual activity: Not on file         Current Outpatient Prescriptions:     Calcium 500 MG tablet, Take 1 tablet by mouth daily, Disp: , Rfl:     Calcium Carbonate (CALCIUM 600) 1500 (600 CA) MG TABS, Take by mouth, Disp: , Rfl:     Cholecalciferol (VITAMIN D3) 5000 units CAPS, Take by mouth, Disp: , Rfl:     CINNAMON PO, Take by mouth, Disp: , Rfl:     cyanocobalamin (VITAMIN B-12) 100 mcg tablet, Take by mouth, Disp: , Rfl:     FORTEO 600 MCG/2 4ML injection, , Disp: , Rfl:     Ginger 500 MG CAPS, Take by mouth, Disp: , Rfl:     Ginger, Zingiber officinalis, (GINGER ROOT) 250 MG CAPS, Take by mouth, Disp: , Rfl:     Insulin Pen Needle (BD PEN NEEDLE WILLIAM U/F) 32G X 4 MM MISC, by Does not apply route, Disp: , Rfl:     loratadine (CLARITIN) 10 mg tablet, Take 10 mg by mouth daily, Disp: , Rfl:     predniSONE 10 mg tablet, Take 10 mg by mouth 2 (two) times a day, Disp: , Rfl: 1    SYNTHROID 112 MCG tablet, TAKE 1 TABLET DAILY MONDAY THROUGH SATURDAY; TAKE 2 TABLETS ON SUNDAY, Disp: 102 tablet, Rfl: 2    Turmeric 500 MG TABS, Take by mouth, Disp: , Rfl:     zolpidem (AMBIEN CR) 12 5 MG CR tablet, Take 1 tablet (12 5 mg total) by mouth daily at bedtime as needed for sleep, Disp: 30 tablet, Rfl: 5    ALPRAZolam (XANAX) 0 5 mg tablet, Take 1 tablet (0 5 mg total) by mouth 2 (two) times a day as needed for anxiety for up to 30 days, Disp: 60 tablet, Rfl: 0    Insulin Pen Needle (BD PEN NEEDLE WILLIAM U/F) 32G X 4 MM MISC, by Does not apply route daily for 100 days, Disp: 100 each, Rfl: 1    teriparatide (FORTEO) 600 MCG/2 4ML injection, Inject 0 08 mL (20 mcg total) under the skin daily for 30 days, Disp: 2 4 mL, Rfl: 5    Allergies   Allergen Reactions    Other     Scopolamine      Reaction Date: 10Aug2011;     Sulfa Antibiotics Hives    Sulfamethoxazole-Trimethoprim      Reaction Date: 10Aug2011;        Objective:  Vitals:    07/03/18 1152   BP: 123/84   Pulse: 67       Body mass index is 24 37 kg/m²  Left Knee Exam     Tenderness   The patient is experiencing tenderness in the patella, patellar tendon and lateral joint line      Range of Motion   Extension:  0 normal   Flexion:  130 (patellofemoral crepitus, lateral patellar facet tenderness) normal     Tests   Amanda:  Medial - negative Lateral - negative  Lachman:  Anterior - negative      Drawer:       Anterior - negative Varus: negative  Valgus: negative  Patellar Apprehension: negative    Other   Erythema: absent  Scars: present (previous arthroscopy scars)  Sensation: normal  Pulse: present  Swelling: none  Effusion: no effusion present    Comments:  Negative Amanda and Apley  Stressing of collateral ligaments at 0, 30, and 90 degrees reveals no instability  Thigh and calf soft and nontender  5/5 knee extension/flexion strength  Normal sensation L2-S1   Ambulates with a normal symmetrical gait          Observations   Left Knee   Negative for effusion  Physical Exam   Constitutional: She is oriented to person, place, and time  She appears well-developed and well-nourished  Body mass index is 24 37 kg/m²  HENT:   Head: Normocephalic and atraumatic  Eyes: EOM are normal    Neck: Normal range of motion  Cardiovascular: Intact distal pulses  Pulmonary/Chest: Effort normal    Musculoskeletal:        Left knee: She exhibits no effusion  See ortho exam   Neurological: She is alert and oriented to person, place, and time  Skin: Skin is warm and dry  Psychiatric: She has a normal mood and affect  Her behavior is normal  Judgment and thought content normal        I have personally reviewed pertinent films in PACS of the weight bearing X-rays taken of her left knee which show severe degenerative changes of the patellofemoral compartment with osteophytes and narrowing, and lateral joint space narrowing with small marginal osteophytes  The medial joint appears healthy

## 2018-07-22 DIAGNOSIS — F41.1 GENERALIZED ANXIETY DISORDER: ICD-10-CM

## 2018-07-23 RX ORDER — ALPRAZOLAM 0.5 MG/1
TABLET ORAL
Qty: 60 TABLET | Refills: 0 | Status: SHIPPED | OUTPATIENT
Start: 2018-07-23 | End: 2018-07-26 | Stop reason: SDUPTHER

## 2018-07-26 DIAGNOSIS — F41.1 GENERALIZED ANXIETY DISORDER: ICD-10-CM

## 2018-07-26 RX ORDER — ALPRAZOLAM 0.5 MG/1
0.5 TABLET ORAL 2 TIMES DAILY PRN
Qty: 60 TABLET | Refills: 0 | Status: SHIPPED | OUTPATIENT
Start: 2018-07-26 | End: 2018-11-08 | Stop reason: SDUPTHER

## 2018-08-15 ENCOUNTER — TELEPHONE (OUTPATIENT)
Dept: FAMILY MEDICINE CLINIC | Facility: CLINIC | Age: 58
End: 2018-08-15

## 2018-08-15 DIAGNOSIS — G47.00 INSOMNIA, UNSPECIFIED TYPE: Primary | ICD-10-CM

## 2018-08-15 RX ORDER — TRAZODONE HYDROCHLORIDE 50 MG/1
50 TABLET ORAL
Qty: 30 TABLET | Refills: 5 | Status: SHIPPED | OUTPATIENT
Start: 2018-08-15 | End: 2018-09-11 | Stop reason: HOSPADM

## 2018-09-11 ENCOUNTER — OFFICE VISIT (OUTPATIENT)
Dept: OBGYN CLINIC | Facility: CLINIC | Age: 58
End: 2018-09-11
Payer: COMMERCIAL

## 2018-09-11 VITALS
HEART RATE: 72 BPM | DIASTOLIC BLOOD PRESSURE: 78 MMHG | HEIGHT: 67 IN | WEIGHT: 156 LBS | SYSTOLIC BLOOD PRESSURE: 127 MMHG | BODY MASS INDEX: 24.48 KG/M2

## 2018-09-11 DIAGNOSIS — M17.12 PRIMARY OSTEOARTHRITIS OF LEFT KNEE: Primary | ICD-10-CM

## 2018-09-11 DIAGNOSIS — G89.29 CHRONIC PAIN OF LEFT KNEE: ICD-10-CM

## 2018-09-11 DIAGNOSIS — M25.562 CHRONIC PAIN OF LEFT KNEE: ICD-10-CM

## 2018-09-11 PROCEDURE — 99214 OFFICE O/P EST MOD 30 MIN: CPT | Performed by: ORTHOPAEDIC SURGERY

## 2018-09-11 NOTE — PROGRESS NOTES
Assessment/Plan:  1  Primary osteoarthritis of left knee  Injection procedure prior authorization   2  Chronic pain of left knee  Injection procedure prior authorization     Patient is here for follow-up regarding her left knee pain  She states that she underwent an injection in 7/3/2018 with a steroid in her left knee and she received approximately 1 month to 1 5 months worth of relief of her pain  She has been very active in states that over the last 3 weeks her knee pain has been starting to return  She had significant benefit from a Synvisc-One injection in her right knee years ago and has not had any issues since and she is here enquiring about this today  With the recent failure of steroid injection into the left knee I feel that this is a reasonable next step in her conservative care  We will start the approval process for Synvisc-One injection for her left knee  She will be notified by the office when she may schedule an appointment for her injection once it has been authorized by her insurance company  All of her questions were addressed today  Subjective:   Left knee pain follow-up    Patient ID: Trupti Avery is a 63 y o  female  HPI  Patient is here for follow-up regarding her left knee pain due to her underlying osteoarthritis  She underwent a steroid injection on 7/3/2018 and states that she got approximately 1 5 months worth of relief from that injection in over the last few weeks her activity related knee pain has started to return  She reports pain with activity specially with yoga and hiking  She states that the pain is diffuse in the left knee  She states that she has had benefit from Synvisc-One injections in her right knee in the past years ago and has not had any issues with her right knee since  She is here to discuss viscosupplementation injections  Review of Systems   Constitutional: Positive for activity change  HENT: Negative  Eyes: Negative      Respiratory: Negative  Cardiovascular: Negative  Gastrointestinal: Negative  Endocrine: Negative  Musculoskeletal: Positive for arthralgias  Skin: Negative  Neurological: Negative  Psychiatric/Behavioral: Negative  Past Medical History:   Diagnosis Date    Anxiety     Cancer [facility])     thyroid    Closed rib fracture     x2    Fall     last assessed 16    Iron deficiency anemia     Viral gastroenteritis     last assessed 12       Past Surgical History:   Procedure Laterality Date    APPENDECTOMY  2006    BREAST SURGERY Left 2001    CATARACT EXTRACTION Left     lens implant     SECTION      CHOLECYSTECTOMY      ESOPHAGOGASTRODUODENOSCOPY      diagnostic    GASTRIC BYPASS      HIP SURGERY Right     HYSTERECTOMY      total abdominal    JOINT REPLACEMENT      KNEE ARTHROSCOPY Bilateral     [place] COLONOSCOPY FLX DX W/COLLJ SPEC WHEN PFRMD N/A 2017    Procedure: EGD AND COLONOSCOPY;  Surgeon: Soren Ferguson MD;  [facility]; Service: Gastroenterology    [place] FEMORAL FX, OPEN [place] Right 10/10/2016    Procedure: FEMORAL NECK FIXATION ;  Surgeon: Stephanie Jarrett MD;  [facility];  Service: Orthopedics    THYROIDECTOMY Bilateral     with LN dissectomy       Family History   Problem Relation Age of Onset    Heart disease Mother         cardiac disorder    Diabetes Mother     Cancer Father         of mouth    Bone cancer Maternal Grandfather     Brain cancer Maternal Grandfather     Lung cancer Maternal Grandfather     Skin cancer Maternal Grandfather     Coronary artery disease Family     Osteoporosis Family     Rheum arthritis Family     Breast cancer Cousin     Melanoma Cousin     Lung cancer Paternal Aunt     Throat cancer Brother        Social History     Occupational History    Not on file       Social History Main Topics    Smoking status: Never Smoker    Smokeless tobacco: Never Used    Alcohol use Yes      Comment: moderate,social    Drug use: No    Sexual activity: Not on file         Current Outpatient Prescriptions:     ALPRAZolam (XANAX) 0 5 mg tablet, Take 1 tablet (0 5 mg total) by mouth 2 (two) times a day as needed for anxiety, Disp: 60 tablet, Rfl: 0    Calcium 500 MG tablet, Take 1 tablet by mouth daily, Disp: , Rfl:     Calcium Carbonate (CALCIUM 600) 1500 (600 CA) MG TABS, Take by mouth, Disp: , Rfl:     Cholecalciferol (VITAMIN D3) 5000 units CAPS, Take by mouth, Disp: , Rfl:     cyanocobalamin (VITAMIN B-12) 100 mcg tablet, Take by mouth, Disp: , Rfl:     FORTEO 600 MCG/2 4ML injection, , Disp: , Rfl:     Insulin Pen Needle (BD PEN NEEDLE WILLIAM U/F) 32G X 4 MM MISC, by Does not apply route daily for 100 days, Disp: 100 each, Rfl: 1    Insulin Pen Needle (BD PEN NEEDLE WILLIAM U/F) 32G X 4 MM MISC, by Does not apply route, Disp: , Rfl:     loratadine (CLARITIN) 10 mg tablet, Take 10 mg by mouth daily, Disp: , Rfl:     SYNTHROID 112 MCG tablet, TAKE 1 TABLET DAILY MONDAY THROUGH SATURDAY; TAKE 2 TABLETS ON SUNDAY, Disp: 102 tablet, Rfl: 2    teriparatide (FORTEO) 600 MCG/2 4ML injection, Inject 0 08 mL (20 mcg total) under the skin daily for 30 days, Disp: 2 4 mL, Rfl: 5    Turmeric 500 MG TABS, Take by mouth, Disp: , Rfl:     Allergies   Allergen Reactions    Other     Scopolamine      Reaction Date: 10Aug2011;     Sulfa Antibiotics Hives    Sulfamethoxazole-Trimethoprim      Reaction Date: 10Aug2011;        Objective:  Vitals:    09/11/18 1231   BP: 127/78   Pulse: 72       Body mass index is 24 43 kg/m²  Right Knee Exam     Other   Other tests: no effusion present      Left Knee Exam     Tenderness   The patient is experiencing tenderness in the patella, patellar tendon and lateral joint line      Range of Motion   Extension:  0 normal   Flexion:  130 (patellofemoral crepitus, lateral patellar facet tenderness) normal     Tests   Aamnda:  Medial - negative Lateral - negative  Lachman:  Anterior - negative      Drawer:       Anterior - negative       Varus: negative  Valgus: negative  Patellar Apprehension: negative    Other   Erythema: absent  Scars: present (previous arthroscopy scars)  Sensation: normal  Pulse: present  Swelling: none  Effusion: no effusion present    Comments:  Negative Amanda and Jose Migueley  Stressing of collateral ligaments at 0, 30, and 90 degrees reveals no instability  Thigh and calf soft and nontender  5/5 knee extension/flexion strength            Observations   Left Knee   Negative for effusion  Right Knee   Negative for effusion  Physical Exam   Constitutional: She is oriented to person, place, and time  She appears well-developed  HENT:   Head: Atraumatic  Eyes: EOM are normal    Neck: Neck supple  Cardiovascular: Normal rate  Pulmonary/Chest: Effort normal    Musculoskeletal:        Right knee: She exhibits no effusion  Left knee: She exhibits no effusion  See orthopedic exam   Neurological: She is alert and oriented to person, place, and time  Skin: Skin is warm and dry  Psychiatric: She has a normal mood and affect  Nursing note and vitals reviewed  Nataly LOVELL    Division of Adult Reconstruction  Department of [facility]

## 2018-09-19 DIAGNOSIS — M81.0 OSTEOPOROSIS, UNSPECIFIED OSTEOPOROSIS TYPE, UNSPECIFIED PATHOLOGICAL FRACTURE PRESENCE: Primary | ICD-10-CM

## 2018-09-20 DIAGNOSIS — M81.0 OSTEOPOROSIS, UNSPECIFIED OSTEOPOROSIS TYPE, UNSPECIFIED PATHOLOGICAL FRACTURE PRESENCE: ICD-10-CM

## 2018-09-26 ENCOUNTER — TELEPHONE (OUTPATIENT)
Dept: ENDOCRINOLOGY | Facility: CLINIC | Age: 58
End: 2018-09-26

## 2018-09-26 NOTE — TELEPHONE ENCOUNTER
Denzel Jeffrey from [facility] [phone] called and advised that Forteo requires prior auth  He stated you can do the PA over the phone

## 2018-10-01 ENCOUNTER — TELEPHONE (OUTPATIENT)
Dept: OBGYN CLINIC | Facility: HOSPITAL | Age: 58
End: 2018-10-01

## 2018-10-01 NOTE — TELEPHONE ENCOUNTER
Bill Raleigh  [phone]    Dr Elham Enriquez    Patient called to get status update on Synvisc injections

## 2018-10-03 ENCOUNTER — TELEPHONE (OUTPATIENT)
Dept: ENDOCRINOLOGY | Facility: CLINIC | Age: 58
End: 2018-10-03

## 2018-10-03 NOTE — TELEPHONE ENCOUNTER
Patient left message and wanted to know if [facility] has [facility]?   She would like a call  at [phone] and let her know if this has been completed

## 2018-10-04 NOTE — TELEPHONE ENCOUNTER
Left message for patient that P A   For Mariza Blairbeth has been submitted thru [facility] Faxed [phone]

## 2018-10-05 ENCOUNTER — TELEPHONE (OUTPATIENT)
Dept: ENDOCRINOLOGY | Facility: CLINIC | Age: 58
End: 2018-10-05

## 2018-10-05 ENCOUNTER — TRANSCRIBE ORDERS (OUTPATIENT)
Dept: LAB | Facility: CLINIC | Age: 58
End: 2018-10-05

## 2018-10-09 ENCOUNTER — OFFICE VISIT (OUTPATIENT)
Dept: ENDOCRINOLOGY | Facility: CLINIC | Age: 58
End: 2018-10-09
Payer: COMMERCIAL

## 2018-10-09 VITALS
SYSTOLIC BLOOD PRESSURE: 126 MMHG | WEIGHT: 158 LBS | DIASTOLIC BLOOD PRESSURE: 88 MMHG | HEART RATE: 70 BPM | HEIGHT: 67 IN | BODY MASS INDEX: 24.8 KG/M2

## 2018-10-09 DIAGNOSIS — E55.9 VITAMIN D DEFICIENCY: ICD-10-CM

## 2018-10-09 DIAGNOSIS — C73 PAPILLARY CARCINOMA OF THYROID (HCC): Primary | ICD-10-CM

## 2018-10-09 DIAGNOSIS — E89.0 POSTOPERATIVE HYPOTHYROIDISM: ICD-10-CM

## 2018-10-09 DIAGNOSIS — M81.0 OSTEOPOROSIS, UNSPECIFIED OSTEOPOROSIS TYPE, UNSPECIFIED PATHOLOGICAL FRACTURE PRESENCE: ICD-10-CM

## 2018-10-09 LAB
25(OH)D3 SERPL-MCNC: 32 NG/ML (ref 30–100)
ALBUMIN SERPL BCP-MCNC: 3.6 G/DL (ref 3.5–5)
ALP SERPL-CCNC: 75 U/L (ref 46–116)
ALT SERPL W P-5'-P-CCNC: 40 U/L (ref 12–78)
ANION GAP SERPL CALCULATED.3IONS-SCNC: 5 MMOL/L (ref 4–13)
AST SERPL W P-5'-P-CCNC: 67 U/L (ref 5–45)
BILIRUB SERPL-MCNC: 0.98 MG/DL (ref 0.2–1)
BUN SERPL-MCNC: 8 MG/DL (ref 5–25)
CALCIUM SERPL-MCNC: 8.7 MG/DL (ref 8.3–10.1)
CHLORIDE SERPL-SCNC: 103 MMOL/L (ref 100–108)
CO2 SERPL-SCNC: 30 MMOL/L (ref 21–32)
CREAT SERPL-MCNC: 0.77 MG/DL (ref 0.6–1.3)
GFR SERPL CREATININE-BSD FRML MDRD: 86 ML/MIN/1.73SQ M
GLUCOSE P FAST SERPL-MCNC: 94 MG/DL (ref 65–99)
POTASSIUM SERPL-SCNC: 4.3 MMOL/L (ref 3.5–5.3)
PROT SERPL-MCNC: 6.8 G/DL (ref 6.4–8.2)
SODIUM SERPL-SCNC: 138 MMOL/L (ref 136–145)
T4 FREE SERPL-MCNC: 1.33 NG/DL (ref 0.76–1.46)
TSH SERPL DL<=0.05 MIU/L-ACNC: 5.47 UIU/ML (ref 0.36–3.74)

## 2018-10-09 PROCEDURE — 99214 OFFICE O/P EST MOD 30 MIN: CPT | Performed by: PHYSICIAN ASSISTANT

## 2018-10-09 PROCEDURE — 80053 COMPREHEN METABOLIC PANEL: CPT | Performed by: PHYSICIAN ASSISTANT

## 2018-10-09 PROCEDURE — 82306 VITAMIN D 25 HYDROXY: CPT | Performed by: PHYSICIAN ASSISTANT

## 2018-10-09 PROCEDURE — 84443 ASSAY THYROID STIM HORMONE: CPT | Performed by: PHYSICIAN ASSISTANT

## 2018-10-09 PROCEDURE — 36415 COLL VENOUS BLD VENIPUNCTURE: CPT | Performed by: PHYSICIAN ASSISTANT

## 2018-10-09 PROCEDURE — 84439 ASSAY OF FREE THYROXINE: CPT | Performed by: PHYSICIAN ASSISTANT

## 2018-10-09 NOTE — PATIENT INSTRUCTIONS
Since you have had about 21 months total of forteo, When approved take Forteo another 3 months  When almost done with forteo, let us know, will start working on prolia approval     We will call you with lab results  If TSH remains elevated, will need slight increase in synthroid dose  Do labs before next visit

## 2018-10-09 NOTE — PROGRESS NOTES
Established Patient Progress Note       Chief Complaint   Patient presents with    Osteoporosis    Thyroid Cancer    Hypothyroidism        History of Present Illness:     Iris Mason is a 63 y o  female with a history of Thyroid Cancer, Hypothyroidism, Osteoporosis, and Vit D Deficiency  She has a history of follicular variant papillary thyroid cancer with Oncocytic features  She underwent total completion thyroidectomy and radioactive iodine ablation in 2010  She underwent Thyrogen stimulated thyroglobulin level/Scan in 2014 which showed no evidence of recurrent/metastatic disease  Thyroid Antibodies have been mildly elevated/stable  Ultrasound 4/2018 showed no evidence of disease  For  postsurgical hypothyroidism, she is currently taking Synthroid 112 mcg Mon-Sat and 2 tabs on sunday  Feels better/sleeping better on lower dose and when takes in mornign  For  her osteoporosis, she has completed 18 months of the Forteo a few years back  Due to falls/fractures about 1 year ago treated with an additional 3 months forteo (may Noreen July)  Was off briefly due to headaches but resumed  Didn't continue full 24 months due to insurance change, waiting on prior auth approval again  Taking Calcium 600mg per day plus high dietary intake  Also for Vitamin D Deficiency, increased to Vitamin D 8,000 daily  She had 1 treatment with reclast in November 2013 and second treatment 3/23/16-- had terrible flu like symptoms  She would not tolerate/absorb oral bisphonates due to hx gastric bypass surgery  Now does Yoga/Running  Stopped heavy weight lifting             Patient Active Problem List   Diagnosis    Femoral neck stress fracture    Primary osteoarthritis of left knee    Primary osteoarthritis of right knee    Anemia    Anxiety    Hematochezia    Closed L4 vertebral fracture (HCC)    Dupuytren's contracture    GERD (gastroesophageal reflux disease)    Lumbar scoliosis    Osteoporosis    Papillary carcinoma of thyroid (HCC)    Pernicious anemia    Postgastrectomy malabsorption    Postoperative hypothyroidism    Seasonal allergies    Vitamin D deficiency    Other insomnia    Chronic pain of left knee      Past Medical History:   Diagnosis Date    Anxiety     Cancer [facility])     thyroid    Closed rib fracture     x2    Fall     last assessed 16    Iron deficiency anemia     Viral gastroenteritis     last assessed 12      Past Surgical History:   Procedure Laterality Date    APPENDECTOMY  2006    BREAST SURGERY Left 2001    CATARACT EXTRACTION Left     lens implant     SECTION      CHOLECYSTECTOMY      ESOPHAGOGASTRODUODENOSCOPY      diagnostic    GASTRIC BYPASS      HIP SURGERY Right     HYSTERECTOMY      total abdominal    JOINT REPLACEMENT      KNEE ARTHROSCOPY Bilateral     IA COLONOSCOPY FLX DX W/COLLJ SPEC WHEN PFRMD N/A 2017    Procedure: EGD AND COLONOSCOPY;  Surgeon: Jeanne Weldon MD;  [facility];   Service: Gastroenterology    IA FEMORAL FX, OPEN TX Right 10/10/2016    Procedure: FEMORAL NECK FIXATION ;  Surgeon: Derek Hanna MD;  [facility];  Service: Orthopedics    THYROIDECTOMY Bilateral     with LN dissectomy      Family History   Problem Relation Age of Onset    Heart disease Mother         cardiac disorder    Diabetes Mother     Cancer Father         of mouth    Bone cancer Maternal Grandfather     Brain cancer Maternal Grandfather     Lung cancer Maternal Grandfather     Skin cancer Maternal Grandfather     Coronary artery disease Family     Osteoporosis Family     Rheum arthritis Family     Breast cancer Cousin     Melanoma Cousin     Lung cancer Paternal Aunt     Throat cancer Brother      Social History   Substance Use Topics    Smoking status: Never Smoker    Smokeless tobacco: Never Used    Alcohol use Yes      Comment: moderate,social     Allergies   Allergen Reactions    Other     Scopolamine      Reaction Date: 10Aug2011;     Sulfa Antibiotics Hives    Sulfamethoxazole-Trimethoprim      Reaction Date: 10Aug2011;        Current Outpatient Prescriptions:     ALPRAZolam (XANAX) 0 5 mg tablet, Take 1 tablet (0 5 mg total) by mouth 2 (two) times a day as needed for anxiety, Disp: 60 tablet, Rfl: 0    Calcium Carbonate (CALCIUM 600) 1500 (600 CA) MG TABS, Take by mouth, Disp: , Rfl:     Cholecalciferol (VITAMIN D3) 5000 units CAPS, Take by mouth, Disp: , Rfl:     cyanocobalamin (VITAMIN B-12) 100 mcg tablet, Take by mouth, Disp: , Rfl:     Insulin Pen Needle (BD PEN NEEDLE WILLIAM U/F) 32G X 4 MM MISC, by Does not apply route, Disp: , Rfl:     loratadine (CLARITIN) 10 mg tablet, Take 10 mg by mouth daily, Disp: , Rfl:     SYNTHROID 112 MCG tablet, TAKE 1 TABLET DAILY MONDAY THROUGH SATURDAY; TAKE 2 TABLETS ON SUNDAY, Disp: 102 tablet, Rfl: 2    Turmeric 500 MG TABS, Take by mouth, Disp: , Rfl:     FORTEO 600 MCG/2 4ML injection, Inject 0 08 mL (20 mcg total) under the skin daily for 90 days (Patient not taking: Reported on 10/9/2018 ), Disp: 7 2 mL, Rfl: 5    Insulin Pen Needle (BD PEN NEEDLE WILLIAM U/F) 32G X 4 MM MISC, by Does not apply route daily for 100 days, Disp: 100 each, Rfl: 1    Review of Systems   Constitutional: Negative for activity change, appetite change, chills, diaphoresis, fatigue, fever and unexpected weight change  HENT: Negative for trouble swallowing and voice change  Eyes: Negative for visual disturbance  Respiratory: Negative for shortness of breath  Cardiovascular: Negative for chest pain and palpitations  Gastrointestinal: Negative for abdominal pain, constipation and diarrhea  Endocrine: Negative for cold intolerance, heat intolerance, polydipsia, polyphagia and polyuria  Genitourinary: Negative for frequency and menstrual problem  Musculoskeletal: Positive for back pain  Negative for arthralgias and myalgias  Skin: Negative for rash  Allergic/Immunologic: Negative for food allergies  Neurological: Negative for dizziness and tremors  Hematological: Negative for adenopathy  Psychiatric/Behavioral: Negative for sleep disturbance  All other systems reviewed and are negative  Physical Exam:  Body mass index is 24 75 kg/m²  /88   Pulse 70   Ht 5' 7" (1 702 m)   Wt 71 7 kg (158 lb)   BMI 24 75 kg/m²    Wt Readings from Last 3 Encounters:   10/09/18 71 7 kg (158 lb)   09/11/18 70 8 kg (156 lb)   07/03/18 70 6 kg (155 lb 9 6 oz)       Physical Exam   Constitutional: She is oriented to person, place, and time  She appears well-developed and well-nourished  No distress  HENT:   Head: Normocephalic and atraumatic  Eyes: Pupils are equal, round, and reactive to light  Conjunctivae are normal    Neck: Normal range of motion  Neck supple  No thyromegaly present  Cardiovascular: Normal rate, regular rhythm and normal heart sounds  Pulmonary/Chest: Effort normal and breath sounds normal  No respiratory distress  She has no wheezes  She has no rales  Abdominal: Soft  Bowel sounds are normal  She exhibits no distension  There is no tenderness  Musculoskeletal: Normal range of motion  She exhibits no edema  Neurological: She is alert and oriented to person, place, and time  Skin: Skin is warm and dry  Psychiatric: She has a normal mood and affect  Vitals reviewed        Labs:       Lab Results   Component Value Date    CREATININE 0 68 10/20/2017    CREATININE 0 77 10/07/2016    CREATININE 0 90 02/22/2016    BUN 7 10/20/2017     10/20/2017    K 4 7 10/20/2017     (H) 10/20/2017    CO2 26 10/20/2017     eGFR   Date Value Ref Range Status   10/20/2017 98 ml/min/1 73sq m Final       Lab Results   Component Value Date    CHOL 180 06/10/2015    HDL 68 (H) 10/20/2017    TRIG 76 10/20/2017       Lab Results   Component Value Date    ALT 19 10/20/2017    AST 21 10/20/2017    ALKPHOS 83 10/20/2017    BILITOT 0 44 06/10/2015       Lab Results   Component Value Date    FREET4 1 19 06/01/2018         Impression & Plan:    Problem List Items Addressed This Visit     Osteoporosis     Waiting to hear back about Forteo approval from insurance  She should get 3 more months of forteo treatment  When forteo she completes an additional 3 months of forteo OR if forteo is not approved, will start prolia  She was given info on prolia  She will let us know after when she completes 3 more months of forteo so we can start prolia approval process  Continue calcium and Vitamin d  Continue regular exercise  She has avoided crossfit and heavy weight lifting  Relevant Orders    Comprehensive metabolic panel    Comprehensive metabolic panel    Papillary carcinoma of thyroid ([facility] [address] ) - Primary     Stable  Will order thyroglobulin panel before next visit for continued monitoring  Relevant Orders    TSH, 3rd generation    T4, free    Thyroglobulin    Postoperative hypothyroidism     Last TSH high normal   Repeat TSH/Free T4 and will increase Synthroid if need to keep TSH in low normal range  Relevant Orders    TSH, 3rd generation    T4, free    Vitamin D deficiency     She has increased supplements to 8,000 units daily after last lab test   Check Vitamin D level  Relevant Orders    Vitamin D 25 hydroxy    Vitamin D 25 hydroxy          Orders Placed This Encounter   Procedures    Comprehensive metabolic panel     This is a patient instruction: Patient fasting for 8 hours or longer recommended   TSH, 3rd generation    T4, free    Vitamin D 25 hydroxy    TSH, 3rd generation     This is a patient instruction: This test is non-fasting  Please drink two glasses of water morning of bloodwork          Standing Status:   Future     Standing Expiration Date:   10/9/2019    T4, free     Standing Status:   Future     Standing Expiration Date:   10/9/2019    Thyroglobulin     Standing Status:   Future     Standing Expiration Date:   10/9/2019    Vitamin D 25 hydroxy     Standing Status:   Future     Standing Expiration Date:   10/9/2019    Comprehensive metabolic panel     This is a patient instruction: Patient fasting for 8 hours or longer recommended  Standing Status:   Future     Standing Expiration Date:   10/9/2019       Patient Instructions   Since you have had about 21 months total of forteo, When approved take Forteo another 3 months  When almost done with forteo, let us know, will start working on prolia approval     We will call you with lab results  If TSH remains elevated, will need slight increase in synthroid dose  Do labs before next visit  Discussed with the patient and all questioned fully answered  She will call me if any problems arise  Follow-up appointment in 6 months       Counseled patient on diagnostic results, prognosis, risk and benefit of treatment options, instruction for management, importance of treatment compliance, Risk  factor reduction and impressions      Yara Lovell PA-C

## 2018-10-10 ENCOUNTER — TELEPHONE (OUTPATIENT)
Dept: OBGYN CLINIC | Facility: HOSPITAL | Age: 58
End: 2018-10-10

## 2018-10-10 DIAGNOSIS — M17.12 PRIMARY OSTEOARTHRITIS OF LEFT KNEE: Primary | ICD-10-CM

## 2018-10-10 DIAGNOSIS — G89.29 CHRONIC PAIN OF LEFT KNEE: ICD-10-CM

## 2018-10-10 DIAGNOSIS — M25.562 CHRONIC PAIN OF LEFT KNEE: ICD-10-CM

## 2018-10-10 NOTE — TELEPHONE ENCOUNTER
Left message for patient informing her that her insurance does not cover gel injections so I checked her pharmarcy benefits which she does have  Asked patient to give a call back if she needed more clarification  Also sent a task to Dr Paramjit Escudero with updated info and request for change for preferred injections

## 2018-10-15 ENCOUNTER — TELEPHONE (OUTPATIENT)
Dept: ENDOCRINOLOGY | Facility: CLINIC | Age: 58
End: 2018-10-15

## 2018-10-15 DIAGNOSIS — E03.9 HYPOTHYROIDISM, UNSPECIFIED TYPE: Primary | ICD-10-CM

## 2018-10-16 ENCOUNTER — TELEPHONE (OUTPATIENT)
Dept: ENDOCRINOLOGY | Facility: CLINIC | Age: 58
End: 2018-10-16

## 2018-10-16 NOTE — TELEPHONE ENCOUNTER
Spoke with MARGAUX about this, because she just spoke with the patient last night and she informed her that she received a letter in the mail that has been approved  See previous note  After speaking to MARGAUX she said that she has spoken to Dr Prakash Rodriguez and they feel that Felibertosadie Mcdonald is not the best treatment going forward (Forteo was only for 3 more months) for the pt  They feel that Prolia is what patient needs to start  I called pt and lm with all of this information  I told her that we would start this process and someone would be in touch with her  She should call with any questions

## 2018-10-16 NOTE — TELEPHONE ENCOUNTER
Crystal can you get patient set up for prolia? CC: Dr Suzan Jean so she is aware since only eligible for 3 more months of forteo will move forward with prolia instead of appealing forteo again  Side effects/prolia pamphlet/need to continue with prolia due to possible increase risk of fracture if discontinued reviewed with patient at visit

## 2018-10-29 ENCOUNTER — TELEPHONE (OUTPATIENT)
Dept: OBGYN CLINIC | Facility: HOSPITAL | Age: 58
End: 2018-10-29

## 2018-10-29 NOTE — TELEPHONE ENCOUNTER
Patient called and left  stating she has paid for her injections already and has signed receipt, so she is unsure why they were sent back? She is frustrated  because she feels like she is getting the run-around and it is taking over a month for there to get this injection so she can have some relief

## 2018-10-29 NOTE — TELEPHONE ENCOUNTER
Called number provided and left detailed voicemail with information provided on the injection status for her, I did advise that as week get updates so will she, I also provided my direct call back number

## 2018-10-29 NOTE — TELEPHONE ENCOUNTER
Dr Sang Butcher    Patient was calling to see if her injections have come in yet  She stated that she has already paid the out of pocket for them  The last note peter has in the referral was that she was calling to give a verbal to ship  Please call patient on status  If she does not answer please leave a detailed message and she will return the call      Thank you

## 2018-10-29 NOTE — TELEPHONE ENCOUNTER
There was a discrepancy with the pharmacy  They stated that they sent the injections to our office and it was rejected I [facility] this morning to find out what can we do to get this injections sent back to us  Once I hear back I will let you know

## 2018-10-29 NOTE — TELEPHONE ENCOUNTER
Thank you so much for getting back to me [facility], [phone]  I will call patient to advise them of the current situation

## 2018-10-29 NOTE — TELEPHONE ENCOUNTER
Saw referral note that you did call specialty pharmacy to ship, just wondering if you had any update on the status?

## 2018-10-30 NOTE — TELEPHONE ENCOUNTER
Called patient and left a message explaining what happened with [facility]  However we should be receiving the package today hopefully and I can call her back to schedule her appointments

## 2018-10-31 NOTE — TELEPHONE ENCOUNTER
Patient called leaving  in regards to injection update and wanting to schedule appointments will be reaching out so I can call and update

## 2018-11-01 NOTE — TELEPHONE ENCOUNTER
Got verbal confirmation from Uzma Bryan that the Visco injection were received in [place] this morning  Called patient to advise we could start to scheduling her for her Visco injections anytime after 11/06/2018

## 2018-11-02 NOTE — TELEPHONE ENCOUNTER
Checked patient's appointment desk and I see there are three appointments set up for her injections

## 2018-11-06 DIAGNOSIS — M81.0 AGE-RELATED OSTEOPOROSIS WITHOUT CURRENT PATHOLOGICAL FRACTURE: Primary | ICD-10-CM

## 2018-11-07 ENCOUNTER — APPOINTMENT (EMERGENCY)
Dept: RADIOLOGY | Facility: HOSPITAL | Age: 58
End: 2018-11-07
Payer: COMMERCIAL

## 2018-11-07 ENCOUNTER — HOSPITAL ENCOUNTER (EMERGENCY)
Facility: HOSPITAL | Age: 58
Discharge: HOME | End: 2018-11-07
Attending: EMERGENCY MEDICINE
Payer: COMMERCIAL

## 2018-11-07 VITALS
OXYGEN SATURATION: 100 % | SYSTOLIC BLOOD PRESSURE: 167 MMHG | TEMPERATURE: 98 F | HEIGHT: 67 IN | WEIGHT: 151 LBS | HEART RATE: 78 BPM | DIASTOLIC BLOOD PRESSURE: 76 MMHG | BODY MASS INDEX: 23.7 KG/M2 | RESPIRATION RATE: 16 BRPM

## 2018-11-07 DIAGNOSIS — M16.12 OSTEOARTHRITIS OF LEFT HIP: Primary | ICD-10-CM

## 2018-11-07 PROCEDURE — 99283 EMERGENCY DEPT VISIT LOW MDM: CPT

## 2018-11-07 PROCEDURE — 73700 CT LOWER EXTREMITY W/O DYE: CPT

## 2018-11-07 RX ORDER — NAPROXEN 500 MG/1
500 TABLET ORAL 2 TIMES DAILY WITH MEALS
Qty: 20 TABLET | Refills: 0 | Status: SHIPPED | OUTPATIENT
Start: 2018-11-07 | End: 2018-11-27

## 2018-11-07 NOTE — ED PROVIDER NOTES
History  Chief Complaint   Patient presents with    Hip Injury     left hip pain for several days,denies trauma,hx rt hip replacement and osteoporosis     62 y/o female presenting with left hip and groin pain over the past 2 weeks with pain ranking 7/10 nonradiating  Relays that this pain feels very similar to when she had a right-sided femoral neck stress fracture  Sees Dr Arvizu Simpler  States that she has history of arthritis in her hips and knees  Pain worsens with ambulation and is better with rest   Relays that she works on her feet all day making it difficult to do so  Has had no falls however she does perform a lot of physical activity running 5 miles a day  With prior right-sided femoral stress fracture, she did not have any injuries either  History of osteoporosis  Has been using crutches  Denies numbness, paresthesias, fevers, back pain, changes in urination, flank pain, nausea, vomiting, abdominal pain  Prior to Admission Medications   Prescriptions Last Dose Informant Patient Reported? Taking?    ALPRAZolam (XANAX) 0 5 mg tablet  Self No No   Sig: Take 1 tablet (0 5 mg total) by mouth 2 (two) times a day as needed for anxiety   Calcium Carbonate (CALCIUM 600) 1500 (600 CA) MG TABS  Self Yes No   Sig: Take by mouth   Cholecalciferol (VITAMIN D3) 5000 units CAPS  Self Yes No   Sig: Take by mouth   FORTEO 600 MCG/2 4ML injection  Self No No   Sig: Inject 0 08 mL (20 mcg total) under the skin daily for 90 days   Patient not taking: Reported on 10/9/2018    SYNTHROID 112 MCG tablet  Self No No   Sig: TAKE 1 TABLET DAILY MONDAY THROUGH SATURDAY; TAKE 2 TABLETS ON SUNDAY   Patient taking differently: TAKE 1 TABLET DAILY MONDAY THROUGH FRIDAY; TAKE 2 TABLETS ON SUNDAY   cyanocobalamin (VITAMIN B-12) 100 mcg tablet  Self Yes No   Sig: Take by mouth   denosumab (PROLIA) 60 mg/mL   No No   Sig: Inject 1 mL (60 mg total) under the skin once for 1 dose   loratadine (CLARITIN) 10 mg tablet  Self Yes No Sig: Take 10 mg by mouth daily      Facility-Administered Medications: None       Past Medical History:   Diagnosis Date    Anxiety     Cancer [facility])     thyroid    Closed rib fracture     x2    Disease of thyroid gland     cancer,had thyroidectomy    Fall     last assessed 16    Iron deficiency anemia     Viral gastroenteritis     last assessed 12       Past Surgical History:   Procedure Laterality Date    APPENDECTOMY  2006    BREAST SURGERY Left 2001    CATARACT EXTRACTION Left     lens implant     SECTION      CHOLECYSTECTOMY      ESOPHAGOGASTRODUODENOSCOPY      diagnostic    GASTRIC BYPASS      HIP SURGERY Right     HYSTERECTOMY      total abdominal    JOINT REPLACEMENT      KNEE ARTHROSCOPY Bilateral     VA COLONOSCOPY FLX DX W/COLLJ SPEC WHEN PFRMD N/A 2017    Procedure: EGD AND COLONOSCOPY;  Surgeon: Jose Hoff MD;  [facility]; Service: Gastroenterology    VA FEMORAL FX, OPEN TX Right 10/10/2016    Procedure: FEMORAL NECK FIXATION ;  Surgeon: Oleksandr Tobias MD;  [facility];  Service: Orthopedics    THYROIDECTOMY Bilateral     with LN dissectomy       Family History   Problem Relation Age of Onset    Heart disease Mother         cardiac disorder    Diabetes Mother     Cancer Father         of mouth    Bone cancer Maternal Grandfather     Brain cancer Maternal Grandfather     Lung cancer Maternal Grandfather     Skin cancer Maternal Grandfather     Coronary artery disease Family     Osteoporosis Family     Rheum arthritis Family     Breast cancer Cousin     Melanoma Cousin     Lung cancer Paternal Aunt     Throat cancer Brother      I have reviewed and agree with the history as documented  Social History   Substance Use Topics    Smoking status: Never Smoker    Smokeless tobacco: Never Used    Alcohol use Yes      Comment: moderate,social        Review of Systems   Constitutional: Negative  HENT: Negative  Eyes: Negative  Respiratory: Negative  Cardiovascular: Negative  Gastrointestinal: Negative  Genitourinary: Negative  Musculoskeletal: Positive for arthralgias  Negative for back pain, gait problem, joint swelling, myalgias, neck pain and neck stiffness  Skin: Negative  Neurological: Negative  All other systems reviewed and are negative  Physical Exam  Physical Exam   Constitutional: She is oriented to person, place, and time  She appears well-developed and well-nourished  HENT:   Head: Normocephalic and atraumatic  Nose: Nose normal    Eyes: Conjunctivae are normal    Cardiovascular: Normal rate  Pulmonary/Chest: Effort normal    spo2 is 100% indicating adequate oxygenation  Musculoskeletal: Normal range of motion  Legs:  Neurological: She is alert and oriented to person, place, and time  Skin: Skin is warm and dry  Capillary refill takes less than 2 seconds  Nursing note and vitals reviewed  Vital Signs  ED Triage Vitals [11/07/18 0812]   Temperature Pulse Respirations Blood Pressure SpO2   98 °F (36 7 °C) 78 16 167/76 100 %      Temp Source Heart Rate Source Patient Position - Orthostatic VS BP Location FiO2 (%)   Oral Monitor Sitting Right arm --      Pain Score       9           Vitals:    11/07/18 0812   BP: 167/76   Pulse: 78   Patient Position - Orthostatic VS: Sitting       Visual Acuity      ED Medications  Medications - No data to display    Diagnostic Studies  Results Reviewed     None                 CT hip left without contrast   Final Result by Nakul Coleman MD (11/07 7242)   No hip fracture        Workstation performed: [id number]                    Procedures  Procedures       Phone Contacts  ED Phone Contact    ED Course                               MDM  Number of Diagnoses or Management Options  Osteoarthritis of left hip:   Diagnosis management comments: Given patient's prior history of right-sided femoral neck stress fractures, CT scan of the left hip was performed and did not show any stress fractures however did show mild-to-moderate osteoarthritis  Will have patient follow up with Orthopedics for re-evaluation, made an MRI if warranted to rule out stress fracture  Patient is informed to return to the emergency department for worsening of symptoms and was given proper education regarding their diagnosis and symptoms  Otherwise the patient is informed to follow up with their primary care doctor for re-evaluation  The patient verbalizes understanding and agrees with above assessment and plan  All questions were answered  Please Note: Fluency Direct voice recognition software may have been used in the creation of this document  Wrong words or sound a like substitutions may have occurred due to the inherent limitations of the voice software  Amount and/or Complexity of Data Reviewed  Tests in the radiology section of CPT®: reviewed and ordered  Review and summarize past medical records: yes  Independent visualization of images, tracings, or specimens: yes      CritCare Time    Disposition  Final diagnoses:   Osteoarthritis of left hip     Time reflects when diagnosis was documented in both MDM as applicable and the Disposition within this note     Time User Action Codes Description Comment    11/7/2018  9:06 AM Odin Whitt Add [M16 12] Osteoarthritis of left hip       ED Disposition     ED Disposition Condition Comment    Discharge  [place] discharge to home/self care      Condition at discharge: Good        Follow-up Information     Follow up With Specialties Details Why Contact Info Additional [address] Emergency Department Emergency Medicine Go to If symptoms worsen [address] [facility]  [phone] [facility], [place], [place], [address] , DO Orthopedic Surgery Schedule an appointment as soon as possible for a visit  [phone] [address]  [phone]             Discharge Medication List as of 11/7/2018  9:07 AM      START taking these medications    Details   naproxen (NAPROSYN) 500 mg tablet Take 1 tablet (500 mg total) by mouth 2 (two) times a day with meals for 10 days, Starting Wed 11/7/2018, Until Sat 11/17/2018, Print         CONTINUE these medications which have NOT CHANGED    Details   ALPRAZolam (XANAX) 0 5 mg tablet Take 1 tablet (0 5 mg total) by mouth 2 (two) times a day as needed for anxiety, Starting Thu 7/26/2018, Normal      Calcium Carbonate (CALCIUM 600) 1500 (600 CA) MG TABS Take by mouth, Historical Med      Cholecalciferol (VITAMIN D3) 5000 units CAPS Take by mouth, Historical Med      cyanocobalamin (VITAMIN B-12) 100 mcg tablet Take by mouth, Historical Med      denosumab (PROLIA) 60 mg/mL Inject 1 mL (60 mg total) under the skin once for 1 dose, Starting Tue 11/6/2018, Normal      FORTEO 600 MCG/2 4ML injection Inject 0 08 mL (20 mcg total) under the skin daily for 90 days, Starting Thu 9/20/2018, Until Wed 12/19/2018, Normal      loratadine (CLARITIN) 10 mg tablet Take 10 mg by mouth daily, Historical Med      SYNTHROID 112 MCG tablet TAKE 1 TABLET DAILY MONDAY THROUGH SATURDAY; TAKE 2 TABLETS ON SUNDAY, Normal           No discharge procedures on file      ED Provider  Electronically Signed by           Beth Henning PA-C  11/07/18 1356

## 2018-11-07 NOTE — DISCHARGE INSTRUCTIONS
Osteoarthritis   WHAT YOU NEED TO KNOW:   Osteoarthritis (OA) occurs when cartilage (tissue that cushions a joint) wears away slowly and causes the bones to rub together  OA is a long-term condition that often affects the hands, neck, lower back, knees, and hips  OA is also called arthrosis or degenerative joint disease  DISCHARGE INSTRUCTIONS:   Return to the emergency department if:   · You have severe pain  · You cannot move your joint  Contact your healthcare provider if:   · You have a fever  · Your joint is red and tender  · You have questions or concerns about your condition or care  Medicines:   · Acetaminophen  is used to decrease pain  It is available without a doctor's order  Ask how much to take and how often to take it  Follow directions  Acetaminophen can cause liver damage if not taken correctly  · NSAIDs , such as ibuprofen, help decrease swelling, pain, and fever  This medicine is available with or without a doctor's order  NSAIDs can cause stomach bleeding or kidney problems in certain people  If you take blood thinner medicine, always ask your healthcare provider if NSAIDs are safe for you  Always read the medicine label and follow directions  · Prescription pain medicine  may be given to decrease severe pain if other medicines do not work  Take the medicine as directed  Do not wait until the pain is severe before you take your medicine  · Take your medicine as directed  Contact your healthcare provider if you think your medicine is not helping or if you have side effects  Tell him or her if you are allergic to any medicine  Keep a list of the medicines, vitamins, and herbs you take  Include the amounts, and when and why you take them  Bring the list or the pill bottles to follow-up visits  Carry your medicine list with you in case of an emergency  Follow up with your healthcare provider as directed:  Write down your questions so you remember to ask them during your visits  Go to physical therapy as directed:  A physical therapist teaches you exercises to help improve movement and strength, and to decrease pain in your joints  The exercises also help lower your risk for loss of function  Manage your OA:   · Stay active  Physical activity may reduce your pain and improve your ability to do daily activities  Avoid activities that cause pain  Ask your healthcare provider what type of exercise would be best for you  · Maintain a healthy weight  This helps decrease the strain on the joints in your back, hips, knees, ankles, and feet  Ask your healthcare provider how much you should weigh  Ask him to help you create a weight loss plan if you are overweight  · Use heat or ice  on your joints as directed  Heat and ice help decrease pain, swelling, and muscle spasms  Use a heating pad on a low setting or take a warm bath  Use an ice pack, or put crushed ice in a plastic bag  Cover it with a towel  · Massage  the muscles around the joint to relieve pain and stiffness  · Use a cane, crutches, or a walker  to protect and relieve pressure on your ankle, knee, and hip joints  You may also be prescribed shoe inserts to decrease pressure in your joints  · Wear flat or low-heeled shoes  This will help decrease pain and reduce pressure on your ankle, knee, and hip joints  © 2017 [employer]  Information is for End User's use only and may not be sold, redistributed or otherwise used for commercial purposes  All illustrations and images included in CareNotes® are the copyrighted property of A D A M , Inc  or Damien Tamayo  The above information is an  only  It is not intended as medical advice for individual conditions or treatments  Talk to your doctor, nurse or pharmacist before following any medical regimen to see if it is safe and effective for you

## 2018-11-08 DIAGNOSIS — F41.1 GENERALIZED ANXIETY DISORDER: ICD-10-CM

## 2018-11-08 RX ORDER — ALPRAZOLAM 0.5 MG/1
0.5 TABLET ORAL 2 TIMES DAILY PRN
Qty: 60 TABLET | Refills: 0 | Status: SHIPPED | OUTPATIENT
Start: 2018-11-08 | End: 2018-12-11 | Stop reason: SDUPTHER

## 2018-11-09 ENCOUNTER — TELEPHONE (OUTPATIENT)
Dept: OBGYN CLINIC | Facility: CLINIC | Age: 58
End: 2018-11-09

## 2018-11-09 ENCOUNTER — OFFICE VISIT (OUTPATIENT)
Dept: OBGYN CLINIC | Facility: CLINIC | Age: 58
End: 2018-11-09
Payer: COMMERCIAL

## 2018-11-09 ENCOUNTER — HOSPITAL ENCOUNTER (OUTPATIENT)
Dept: RADIOLOGY | Age: 58
Discharge: HOME | End: 2018-11-09
Payer: COMMERCIAL

## 2018-11-09 VITALS
HEART RATE: 74 BPM | WEIGHT: 153 LBS | BODY MASS INDEX: 24.01 KG/M2 | DIASTOLIC BLOOD PRESSURE: 81 MMHG | SYSTOLIC BLOOD PRESSURE: 146 MMHG | HEIGHT: 67 IN

## 2018-11-09 DIAGNOSIS — M25.552 PAIN IN LEFT HIP: Primary | ICD-10-CM

## 2018-11-09 DIAGNOSIS — M25.552 PAIN IN LEFT HIP: ICD-10-CM

## 2018-11-09 DIAGNOSIS — M16.12 PRIMARY OSTEOARTHRITIS OF ONE HIP, LEFT: ICD-10-CM

## 2018-11-09 PROCEDURE — 99214 OFFICE O/P EST MOD 30 MIN: CPT | Performed by: ORTHOPAEDIC SURGERY

## 2018-11-09 PROCEDURE — 73721 MRI JNT OF LWR EXTRE W/O DYE: CPT

## 2018-11-09 NOTE — TELEPHONE ENCOUNTER
Patient was called at the time of this encounter to inform her of her left hip MRI results  She is found to have a left femoral neck stress fracture on the inferior aspect of the femoral neck  It consumes less than 50 percent of the femoral neck  This is on the compression side of the femoral neck and does not need surgical intervention at this time  I recommended that the patient remain strict nonweightbearing with crutches for the next 6 weeks and I will see her back in the office at that time  The patient demonstrates understanding of my recommendations as well as the findings on the MRI  She did have a similar issue back in 2016 in the right femoral neck therefore she is familiar with the finding and treatment protocols  All of her questions were addressed  She was advised to call the office immediately if her pain quality or quantity changes despite nonweightbearing efforts  I will see her back in 6 weeks time for follow-up  Pat LOVELL    Division of Adult Reconstruction  Department of [facility]

## 2018-11-09 NOTE — PROGRESS NOTES
Assessment/Plan:  1  Pain in left hip  XR hip/pelv 2-3 vws left if performed    MRI hip left wo contrast   2  Primary osteoarthritis of one hip, left       Patient is here for a new issue regarding her left hip  After thorough history, clinical exam and review of her CT scan from the emergency department she does have osteoarthritis that is mild to moderate in her left hip however I do not feel that this is the issue at hand today  An underlying I am concerned for an underlying femoral neck stress fracture based off her mechanism of injury and her pain, in addition to her history of having a right-sided femoral neck fracture in 2016 that required surgical fixation  I recommended that she undergo a stat MRI of her left hip to evaluate if this is present and we will further delineate her plan of care from there  I will call her the results of her MRI  Subjective:  Left hip pain    Patient ID: Dennis Vega is a 63 y o  female  HPI  Patient is a 52-year-old female here for evaluation of acute left hip pain x1 week  She states that she was playing softball on Sunday when she slipped in a puddle and caught herself and subsequently had significant left hip pain  She was unable to put weight on it Monday due to left hip pain and pain in her groin  The pain is 10/10 on the pain scale and she cannot put weight on her left lower extremity this point  She states the pain is not improved since her incident on Sunday during softball  She denies hitting the ground during the slipping event and states that she just caught herself after slipping  She denies any paresthesias down left lower extremity  The pain is relieved with rest when she is not up and attempting to use it  She was seen evaluated emergency department early this week and a CT scan was obtained  She has currently been nonweightbearing and using crutches until her evaluation with me here today    She does have a significant history of a right femoral neck fracture that was discovered in 2016 that needed surgical intervention  She is a active 72-year-old female that does enjoy running and recently participated in 5 5ks this summer  Review of Systems   Constitutional: Positive for activity change  HENT: Negative  Eyes: Negative  Respiratory: Negative  Cardiovascular: Negative  Gastrointestinal: Negative  Endocrine: Negative  Musculoskeletal: Positive for arthralgias and gait problem  Skin: Negative  Psychiatric/Behavioral: Negative  Past Medical History:   Diagnosis Date    Anxiety     Cancer [facility])     thyroid    Closed rib fracture     x2    Disease of thyroid gland     cancer,had thyroidectomy    Fall     last assessed 16    Iron deficiency anemia     Viral gastroenteritis     last assessed 12       Past Surgical History:   Procedure Laterality Date    APPENDECTOMY  2006    BREAST SURGERY Left 2001    CATARACT EXTRACTION Left     lens implant     SECTION      CHOLECYSTECTOMY      ESOPHAGOGASTRODUODENOSCOPY      diagnostic    GASTRIC BYPASS      HIP SURGERY Right     HYSTERECTOMY      total abdominal    JOINT REPLACEMENT      KNEE ARTHROSCOPY Bilateral     NJ COLONOSCOPY FLX DX W/COLLJ SPEC WHEN PFRMD N/A 2017    Procedure: EGD AND COLONOSCOPY;  Surgeon: Deshawn Torres MD;  [facility];   Service: Gastroenterology    NJ FEMORAL FX, OPEN TX Right 10/10/2016    Procedure: FEMORAL NECK FIXATION ;  Surgeon: Saira Martin MD;  [facility];  Service: Orthopedics    THYROIDECTOMY Bilateral     with LN dissectomy       Family History   Problem Relation Age of Onset    Heart disease Mother         cardiac disorder    Diabetes Mother     Cancer Father         of mouth    Bone cancer Maternal Grandfather     Brain cancer Maternal Grandfather     Lung cancer Maternal Grandfather     Skin cancer Maternal Grandfather     Coronary artery disease Family     Osteoporosis Family     Rheum arthritis Family     Breast cancer Cousin     Melanoma Cousin     Lung cancer Paternal Aunt     Throat cancer Brother        Social History     Occupational History    Not on file  Social History Main Topics    Smoking status: Never Smoker    Smokeless tobacco: Never Used    Alcohol use Yes      Comment: moderate,social    Drug use: No    Sexual activity: Not on file         Current Outpatient Prescriptions:     ALPRAZolam (XANAX) 0 5 mg tablet, Take 1 tablet (0 5 mg total) by mouth 2 (two) times a day as needed for anxiety, Disp: 60 tablet, Rfl: 0    Calcium Carbonate (CALCIUM 600) 1500 (600 CA) MG TABS, Take by mouth, Disp: , Rfl:     Cholecalciferol (VITAMIN D3) 5000 units CAPS, Take by mouth, Disp: , Rfl:     cyanocobalamin (VITAMIN B-12) 100 mcg tablet, Take by mouth, Disp: , Rfl:     FORTEO 600 MCG/2 4ML injection, Inject 0 08 mL (20 mcg total) under the skin daily for 90 days, Disp: 7 2 mL, Rfl: 5    loratadine (CLARITIN) 10 mg tablet, Take 10 mg by mouth daily, Disp: , Rfl:     naproxen (NAPROSYN) 500 mg tablet, Take 1 tablet (500 mg total) by mouth 2 (two) times a day with meals for 10 days, Disp: 20 tablet, Rfl: 0    SYNTHROID 112 MCG tablet, TAKE 1 TABLET DAILY MONDAY THROUGH SATURDAY; TAKE 2 TABLETS ON SUNDAY (Patient taking differently: TAKE 1 TABLET DAILY MONDAY THROUGH FRIDAY; TAKE 2 TABLETS ON SUNDAY), Disp: 102 tablet, Rfl: 2    denosumab (PROLIA) 60 mg/mL, Inject 1 mL (60 mg total) under the skin once for 1 dose, Disp: 1 mL, Rfl: 0    Allergies   Allergen Reactions    Other     Scopolamine      Reaction Date: 10Aug2011;     Sulfa Antibiotics Hives    Sulfamethoxazole-Trimethoprim      Reaction Date: 10Aug2011;        Objective:  Vitals:    11/09/18 1032   BP: 146/81   Pulse: 74       Body mass index is 23 96 kg/m²      Right Knee Exam     Other   Other tests: no effusion present      Left Hip Exam     Tenderness   The patient is experiencing tenderness in the anterior  Range of Motion   Extension: abnormal   Flexion: abnormal   Internal Rotation: abnormal   External Rotation: abnormal   Abduction: abnormal   Adduction: abnormal     Muscle Strength   Left hip normal muscle strength: With pain  Flexion: 5/5     Other   Erythema: absent  Scars: absent  Sensation: normal  Pulse: present    Comments:  Gentle passive range of motion of internal and external rotation as well as flexion causes pain into the left groin  Resisted hip flexion also causes significant discomfort in the left groin  No tenderness to palpation over the greater troch but tenderness palpation anteriorly  Neurovascular intact left lower extremity          Observations     Right Knee   Negative for effusion  Physical Exam   Constitutional: She is oriented to person, place, and time  She appears well-developed  HENT:   Head: Atraumatic  Eyes: EOM are normal    Neck: Neck supple  Cardiovascular: Normal rate  Pulmonary/Chest: Effort normal    Musculoskeletal:        Right knee: She exhibits no effusion  See orthopedic exam   Neurological: She is alert and oriented to person, place, and time  Skin: Skin is warm and dry  Psychiatric: She has a normal mood and affect  Nursing note and vitals reviewed  I have personally reviewed pertinent films in PACS  CT scan of the left hip obtained on 11/7/2018 reviewed by me demonstrates mild to moderate osteoarthritis of the left hip with no sign of acute fracture of the left femoral neck or hip  No lytic or blastic lesions  Maria Antonia LOVELL    Division of Adult Reconstruction  Department of [facility]

## 2018-11-10 DIAGNOSIS — G47.09 OTHER INSOMNIA: ICD-10-CM

## 2018-11-13 ENCOUNTER — OFFICE VISIT (OUTPATIENT)
Dept: OBGYN CLINIC | Facility: CLINIC | Age: 58
End: 2018-11-13
Payer: COMMERCIAL

## 2018-11-13 ENCOUNTER — TELEPHONE (OUTPATIENT)
Dept: ENDOCRINOLOGY | Facility: CLINIC | Age: 58
End: 2018-11-13

## 2018-11-13 VITALS
BODY MASS INDEX: 24.01 KG/M2 | DIASTOLIC BLOOD PRESSURE: 84 MMHG | SYSTOLIC BLOOD PRESSURE: 144 MMHG | WEIGHT: 153 LBS | HEIGHT: 67 IN | HEART RATE: 76 BPM

## 2018-11-13 DIAGNOSIS — G47.09 OTHER INSOMNIA: ICD-10-CM

## 2018-11-13 DIAGNOSIS — M25.552 LEFT HIP PAIN: ICD-10-CM

## 2018-11-13 DIAGNOSIS — M84.353A STRESS FRACTURE OF NECK OF FEMUR, INITIAL ENCOUNTER: ICD-10-CM

## 2018-11-13 DIAGNOSIS — M17.12 PRIMARY OSTEOARTHRITIS OF LEFT KNEE: Primary | ICD-10-CM

## 2018-11-13 PROCEDURE — 99213 OFFICE O/P EST LOW 20 MIN: CPT | Performed by: ORTHOPAEDIC SURGERY

## 2018-11-13 RX ORDER — ZOLPIDEM TARTRATE 12.5 MG/1
12.5 TABLET, FILM COATED, EXTENDED RELEASE ORAL
Qty: 30 TABLET | Refills: 0 | Status: SHIPPED | OUTPATIENT
Start: 2018-11-13 | End: 2018-12-11 | Stop reason: SDUPTHER

## 2018-11-13 RX ORDER — ZOLPIDEM TARTRATE 12.5 MG/1
TABLET, FILM COATED, EXTENDED RELEASE ORAL
Qty: 15 TABLET | Refills: 4 | Status: CANCELLED | OUTPATIENT
Start: 2018-11-13

## 2018-11-13 NOTE — TELEPHONE ENCOUNTER
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

## 2018-11-13 NOTE — TELEPHONE ENCOUNTER
Did she start prolia already ? ? If she has not and can get the last few months of forteo - even if we have to get her some samples - lets do that     If she has already received prolia - then hold off on forteo

## 2018-11-13 NOTE — LETTER
November 13, 2018     Patient: Iris Mason   YOB: 1960   Date of Visit: 11/13/2018       To Whom it May Concern:    Iris Mason is under my professional care  She was seen in my office on 11/13/2018  She may return to work with limitations 11/14/18  She needs to remain non-weight bearing on her left lower extremity at all times for the next 6 weeks  Accordingly, she should be limited to 6 hour shifts until we follow up with her       If you have any questions or concerns, please don't hesitate to call           Sincerely,          Joann Garcia DO        CC: No Recipients

## 2018-11-13 NOTE — PROGRESS NOTES
Assessment/Plan:  1  Primary osteoarthritis of left knee     2  Stress fracture of neck of femur, initial encounter     3  Left hip pain       Felicita Ojeda is a very pleasant 63year old female with activity related left knee pain due to her underlying osteoarthritis, primarily of the patellofemoral compartment as well as a recently diagnosed partial stress fracture of the left femoral neck, for which she is non-weight bearing on the left lower extremity  Because she is not putting weight on the left knee, she is not currently having pain  Therefore, we agreed to defer the Gelsyn-3 injections today as there is no role for prophylactic injections and they would more likely be efficacious once she initiates weight bearing again  We had a long discussion with her about being non-weight bearing on the left leg to avoid propagation and extension of the stress fracture in the femoral neck  She expressed understanding and will attempt to modify her activities accordingly  Additionally, we provided a note that she should not work more than 6 hour shifts until we follow up with her  We will plan to see her back in 6 weeks for a clinical reevaluation and determination of the need for further imaging  She is familiar with this process as she had the same problem in her right hip 2 years ago  All questions addressed  Subjective: Left knee and hip follow up    Patient ID: John Gerardo is a 63 y o  female  Felicita Ojeda is a pleasant 63year old female presenting for follow up here for her activity related left knee pain due to her underlying osteoarthritis  She also recently found out that she has a stress fracture of the left femoral neck, and has been non-weight bearing on the left lower extremity since Friday  She does have some questions about what activity she can do while non-weight bearing  She did try to work as a PA, 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   Since she is not putting weight on her left leg, her left knee does not cause her any pain at present  She denies new injuries  Review of Systems   Constitutional: Negative  HENT: Negative  Eyes: Negative  Respiratory: Negative  Cardiovascular: Negative  Gastrointestinal: Negative  Endocrine: Negative  Genitourinary: Negative  Musculoskeletal: Positive for arthralgias and joint swelling  Skin: Negative  Allergic/Immunologic: Negative  Neurological: Negative  Hematological: Negative  Psychiatric/Behavioral: Negative  Past Medical History:   Diagnosis Date    Anxiety     Cancer [facility])     thyroid    Closed rib fracture     x2    Disease of thyroid gland     cancer,had thyroidectomy    Fall     last assessed 16    Iron deficiency anemia     Viral gastroenteritis     last assessed 12       Past Surgical History:   Procedure Laterality Date    APPENDECTOMY  2006    BREAST SURGERY Left 2001    CATARACT EXTRACTION Left     lens implant     SECTION      CHOLECYSTECTOMY      ESOPHAGOGASTRODUODENOSCOPY      diagnostic    GASTRIC BYPASS      HIP SURGERY Right     HYSTERECTOMY      total abdominal    JOINT REPLACEMENT      KNEE ARTHROSCOPY Bilateral     DC COLONOSCOPY FLX DX W/COLLJ SPEC WHEN PFRMD N/A 2017    Procedure: EGD AND COLONOSCOPY;  Surgeon: Andrew Jean MD;  [facility];   Service: Gastroenterology    DC FEMORAL FX, OPEN TX Right 10/10/2016    Procedure: FEMORAL NECK FIXATION ;  Surgeon: Bianka Hdez MD;  [facility];  Service: Orthopedics    THYROIDECTOMY Bilateral     with LN dissectomy       Family History   Problem Relation Age of Onset    Heart disease Mother         cardiac disorder    Diabetes Mother     Cancer Father         of mouth    Bone cancer Maternal Grandfather     Brain cancer Maternal Grandfather     Lung cancer Maternal Grandfather     Skin cancer Maternal Grandfather     Coronary artery disease Family     Osteoporosis Family     Rheum arthritis Family     Breast cancer Cousin     Melanoma Cousin     Lung cancer Paternal Aunt     Throat cancer Brother        Social History     Occupational History    Not on file       Social History Main Topics    Smoking status: Never Smoker    Smokeless tobacco: Never Used    Alcohol use Yes      Comment: moderate,social    Drug use: No    Sexual activity: Not on file         Current Outpatient Prescriptions:     ALPRAZolam (XANAX) 0 5 mg tablet, Take 1 tablet (0 5 mg total) by mouth 2 (two) times a day as needed for anxiety, Disp: 60 tablet, Rfl: 0    Calcium Carbonate (CALCIUM 600) 1500 (600 CA) MG TABS, Take by mouth, Disp: , Rfl:     Cholecalciferol (VITAMIN D3) 5000 units CAPS, Take by mouth, Disp: , Rfl:     cyanocobalamin (VITAMIN B-12) 100 mcg tablet, Take by mouth, Disp: , Rfl:     FORTEO 600 MCG/2 4ML injection, Inject 0 08 mL (20 mcg total) under the skin daily for 90 days, Disp: 7 2 mL, Rfl: 5    loratadine (CLARITIN) 10 mg tablet, Take 10 mg by mouth daily, Disp: , Rfl:     naproxen (NAPROSYN) 500 mg tablet, Take 1 tablet (500 mg total) by mouth 2 (two) times a day with meals for 10 days, Disp: 20 tablet, Rfl: 0    SYNTHROID 112 MCG tablet, TAKE 1 TABLET DAILY MONDAY THROUGH SATURDAY; TAKE 2 TABLETS ON SUNDAY (Patient taking differently: TAKE 1 TABLET DAILY MONDAY THROUGH FRIDAY; TAKE 2 TABLETS ON SUNDAY), Disp: 102 tablet, Rfl: 2    zolpidem (AMBIEN CR) 12 5 MG CR tablet, Take 1 tablet (12 5 mg total) by mouth daily at bedtime as needed for sleep, Disp: 30 tablet, Rfl: 0    denosumab (PROLIA) 60 mg/mL, Inject 1 mL (60 mg total) under the skin once for 1 dose, Disp: 1 mL, Rfl: 0    Allergies   Allergen Reactions    Other     Scopolamine      Reaction Date: 10Aug2011;     Sulfa Antibiotics Hives    Sulfamethoxazole-Trimethoprim      Reaction Date: 10Aug2011;        Objective:  Vitals:    11/13/18 0935   BP: 144/84   Pulse: 76       Body mass index is 23 96 kg/m²  Left Knee Exam     Tenderness   The patient is experiencing no tenderness  Range of Motion   Extension:  0 normal   Flexion:  120 normal     Muscle Strength     The patient has normal left knee strength  Tests   Amanda:  Medial - negative Lateral - negative  Lachman:  Anterior - negative      Drawer:       Anterior - negative       Varus: negative  Valgus: negative  Patellar Apprehension: negative    Other   Erythema: absent  Scars: absent  Sensation: normal  Pulse: present  Swelling: none  Effusion: no effusion present    Comments:  Crepitus with PROM but negative PFG  Calf soft and non tender  Negative Apley/Amanda  Grossly NVI  Ambulates with crutches and non-weight bearing of the left lower extremity          Observations   Left Knee   Negative for effusion  Physical Exam   Constitutional: She is oriented to person, place, and time  She appears well-developed and well-nourished  Body mass index is 23 96 kg/m²  HENT:   Head: Normocephalic and atraumatic  Eyes: EOM are normal    Neck: Normal range of motion  Cardiovascular: Intact distal pulses  Pulmonary/Chest: Effort normal    Musculoskeletal:        Left knee: She exhibits no effusion  See ortho exam   Neurological: She is alert and oriented to person, place, and time  Skin: Skin is warm and dry  Psychiatric: She has a normal mood and affect   Her behavior is normal  Judgment and thought content normal

## 2018-11-13 NOTE — TELEPHONE ENCOUNTER
Note   RIKKI IS STILL TAKING THIS MED  AND SHE DOES TAKE IT ABOUT 4 NIGHTS A WEEK  A PA WAS ALSO RECENTLY COMPLETED HERE IN OUR OFFICE      PLEASE SEND TO [facility] [address]     I ADDED THE CORRECT [facility] TO HER [facility]

## 2018-11-13 NOTE — TELEPHONE ENCOUNTER
Pt called and said she has a stress fracture in her left hip and femoral neck, she will be on crutches for six weeks at least

## 2018-11-13 NOTE — TELEPHONE ENCOUNTER
Please let patient know she should hold off on starting prolia until the fracture has healed since it is possible that prolia can slow fracture healing  Can you ask her to call us once fracture is fully healed then would schedule prolia after that? Thanks

## 2018-11-14 NOTE — TELEPHONE ENCOUNTER
Spoke with patient and she just completed 1 month of forteo and has 2 more to go  Advised her to continue the 2 more months of prolia and will place referral to [facility] at [place]  Advised her to call us if she doesn't hear from [facility] in the next week or so    Sofi-- we are going to hold off on prolia for sasha Pierce

## 2018-11-16 ENCOUNTER — TELEPHONE (OUTPATIENT)
Dept: ENDOCRINOLOGY | Facility: CLINIC | Age: 58
End: 2018-11-16

## 2018-11-27 ENCOUNTER — OFFICE VISIT (OUTPATIENT)
Dept: FAMILY MEDICINE CLINIC | Facility: CLINIC | Age: 58
End: 2018-11-27
Payer: COMMERCIAL

## 2018-11-27 VITALS
OXYGEN SATURATION: 98 % | DIASTOLIC BLOOD PRESSURE: 84 MMHG | SYSTOLIC BLOOD PRESSURE: 122 MMHG | HEART RATE: 78 BPM | RESPIRATION RATE: 15 BRPM | TEMPERATURE: 97.6 F

## 2018-11-27 DIAGNOSIS — Z23 ENCOUNTER FOR IMMUNIZATION: ICD-10-CM

## 2018-11-27 DIAGNOSIS — Z00.00 WELL ADULT EXAM: Primary | ICD-10-CM

## 2018-11-27 DIAGNOSIS — M80.80XA LOCALIZED OSTEOPOROSIS WITH CURRENT PATHOLOGICAL FRACTURE, INITIAL ENCOUNTER: ICD-10-CM

## 2018-11-27 PROBLEM — K92.1 HEMATOCHEZIA: Status: RESOLVED | Noted: 2017-11-08 | Resolved: 2018-11-27

## 2018-11-27 PROBLEM — S32.049A CLOSED L4 VERTEBRAL FRACTURE (HCC): Status: RESOLVED | Noted: 2017-12-07 | Resolved: 2018-11-27

## 2018-11-27 PROCEDURE — 90471 IMMUNIZATION ADMIN: CPT

## 2018-11-27 PROCEDURE — 99396 PREV VISIT EST AGE 40-64: CPT | Performed by: FAMILY MEDICINE

## 2018-11-27 PROCEDURE — 90715 TDAP VACCINE 7 YRS/> IM: CPT

## 2018-11-27 NOTE — PROGRESS NOTES
FAMILY PRACTICE HEALTH MAINTENANCE OFFICE VISIT  [facility]    NAME: Cherylene Lessen  AGE: 63 y o  SEX: female  : 1960     DATE: 2018    Assessment and Plan     Problem List Items Addressed This Visit     Osteoporosis     Seeing endocrine  Seeing bone center in [facility]  On forteo         Well adult exam - Primary     tdap today                 · Patient Counseling:   · Nutrition: Stressed importance of a well balanced diet, moderation of sodium/saturated fat, caloric balance and sufficient intake of fiber  · Exercise: Stressed the importance of regular exercise with a goal of 150 minutes per week  · Dental Health: Discussed daily flossing and brushing and regular dental visits   · Alcohol Use:  Recommended moderation of alcohol intake  · Injury Prevention: Discussed Safety Belts, Safety Helmets, and Smoke Detectors    · Immunizations reviewed need tdap  · Discussed benefits of screening up to date  BMI Counseling: There is no height or weight on file to calculate BMI  Discussed with patient's BMI with her  The BMI is in the acceptable range  Return in 1 year (on 2019)          Chief Complaint     Chief Complaint   Patient presents with    Physical Exam     Patient here for annual wellness exam        History of Present Illness     Here for wellness  Recent stress fracture femoral neck from running-seeing ortho Dec 28th  Was off forteo for 3 month  Going to see specialist in [facility] [address]- July  Bone density worse in femoral hip        Well Adult Physical   Patient here for a comprehensive physical exam       Diet and Physical Activity  Diet: well balanced diet  Weight concerns: None, patient's BMI is between 18 5-24 9  Exercise: daily      Depression Screen  PHQ-9 Depression Screening    PHQ-9:    Frequency of the following problems over the past two weeks:               General Health  Hearing: Normal:  bilateral  Vision: no vision problems and most recent eye exam <1 year  Dental: regular dental visits and brushes teeth twice daily    Reproductive Health  Up to date      The following portions of the patient's history were reviewed and updated as appropriate: allergies, current medications, past family history, past medical history, past social history, past surgical history and problem list     Review of Systems     Review of Systems   Constitutional: Positive for fatigue  HENT: Negative  Eyes: Negative  Respiratory: Negative  Cardiovascular: Negative  Gastrointestinal: Negative  Endocrine: Negative  Musculoskeletal: Positive for gait problem (on crutches- stress fracture in left hip)  Allergic/Immunologic: Negative  Hematological: Negative  Psychiatric/Behavioral: Negative  Past Medical History     Past Medical History:   Diagnosis Date    Anxiety     Cancer [facility])     thyroid    Closed rib fracture     x2    Disease of thyroid gland     cancer,had thyroidectomy    Fall     last assessed 16    Iron deficiency anemia     Viral gastroenteritis     last assessed 12       Past Surgical History     Past Surgical History:   Procedure Laterality Date    APPENDECTOMY  2006    BREAST SURGERY Left 2001    CATARACT EXTRACTION Left     lens implant     SECTION      CHOLECYSTECTOMY      ESOPHAGOGASTRODUODENOSCOPY      diagnostic    GASTRIC BYPASS      HIP SURGERY Right     HYSTERECTOMY      total abdominal    JOINT REPLACEMENT      KNEE ARTHROSCOPY Bilateral     MD COLONOSCOPY FLX DX W/COLLJ SPEC WHEN PFRMD N/A 2017    Procedure: EGD AND COLONOSCOPY;  Surgeon: Mariza Jolley MD;  [facility];   Service: Gastroenterology    MD FEMORAL FX, OPEN TX Right 10/10/2016    Procedure: FEMORAL NECK FIXATION ;  Surgeon: Rachna Salcido MD;  [facility];  Service: Orthopedics    THYROIDECTOMY Bilateral     with LN dissectomy       Social History     Social History     Social History    Marital status:      Spouse name: N/A    Number of children: N/A    Years of education: N/A     Social History Main Topics    Smoking status: Never Smoker    Smokeless tobacco: Never Used    Alcohol use Yes      Comment: moderate,social    Drug use: No    Sexual activity: Not Asked     Other Topics Concern    None     Social History Narrative    Caffeine use    Daily coffee 4 cups a day    Exercises regularly           Family History     Family History   Problem Relation Age of Onset    Heart disease Mother         cardiac disorder    Diabetes Mother     Cancer Father         of mouth    Bone cancer Maternal Grandfather     Brain cancer Maternal Grandfather     Lung cancer Maternal Grandfather     Skin cancer Maternal Grandfather     Coronary artery disease Family     Osteoporosis Family     Rheum arthritis Family     Breast cancer Cousin     Melanoma Cousin     Lung cancer Paternal Aunt     Throat cancer Brother        Current Medications       Current Outpatient Prescriptions:     ALPRAZolam (XANAX) 0 5 mg tablet, Take 1 tablet (0 5 mg total) by mouth 2 (two) times a day as needed for anxiety, Disp: 60 tablet, Rfl: 0    Calcium Carbonate (CALCIUM 600) 1500 (600 CA) MG TABS, Take by mouth, Disp: , Rfl:     Cholecalciferol (VITAMIN D3) 5000 units CAPS, Take by mouth, Disp: , Rfl:     cyanocobalamin (VITAMIN B-12) 100 mcg tablet, Take by mouth, Disp: , Rfl:     FORTEO 600 MCG/2 4ML injection, Inject 0 08 mL (20 mcg total) under the skin daily for 90 days, Disp: 7 2 mL, Rfl: 5    loratadine (CLARITIN) 10 mg tablet, Take 10 mg by mouth daily, Disp: , Rfl:     SYNTHROID 112 MCG tablet, TAKE 1 TABLET DAILY MONDAY THROUGH SATURDAY; TAKE 2 TABLETS ON SUNDAY (Patient taking differently: TAKE 1 TABLET DAILY MONDAY THROUGH FRIDAY; TAKE 2 TABLETS ON SUNDAY), Disp: 102 tablet, Rfl: 2    zolpidem (AMBIEN CR) 12 5 MG CR tablet, Take 1 tablet (12 5 mg total) by mouth daily at bedtime as needed for sleep, Disp: 30 tablet, Rfl: 0     Allergies     Allergies   Allergen Reactions    Other     Scopolamine      Reaction Date: 10Aug2011;     Sulfa Antibiotics Hives    Sulfamethoxazole-Trimethoprim      Reaction Date: 10Aug2011;        Objective     /84   Pulse 78   Temp 97 6 °F (36 4 °C)   Resp 15   SpO2 98%      Physical Exam   Constitutional: She is oriented to person, place, and time  Vital signs are normal  She appears well-developed and well-nourished  HENT:   Head: Normocephalic and atraumatic  Nose: Nose normal    Mouth/Throat: Oropharynx is clear and moist    Eyes: Pupils are equal, round, and reactive to light  Conjunctivae, EOM and lids are normal    Neck: Normal range of motion  Neck supple  Cardiovascular: Normal rate, regular rhythm, S1 normal, S2 normal, normal heart sounds and intact distal pulses  Pulmonary/Chest: Effort normal and breath sounds normal    Abdominal: Soft  Bowel sounds are normal    Musculoskeletal: Normal range of motion  Neurological: She is alert and oriented to person, place, and time  She has normal strength and normal reflexes  Skin: Skin is warm, dry and intact  Psychiatric: She has a normal mood and affect  Her speech is normal and behavior is normal  Judgment and thought content normal  Cognition and memory are normal    Nursing note and vitals reviewed          No exam data present    Health Maintenance     Health Maintenance   Topic Date Due    Hepatitis C Screening  1960    Pneumococcal PPSV23 Highest Risk Adult (1 of 3 - PCV13) 02/05/1980    DTaP,Tdap,and Td Vaccines (1 - Tdap) 02/05/1982    Depression Screening PHQ  04/03/2019    MAMMOGRAM  04/20/2019    CRC Screening: Colonoscopy  11/21/2020    INFLUENZA VACCINE  Completed     Immunization History   Administered Date(s) Administered    Influenza 10/17/2017, 10/30/2018    Influenza Quadrivalent Preservative Free 3 years and older IM 10/17/2017       Lucile Councilman Bethany Rocha, [address] [facility]

## 2018-11-27 NOTE — PATIENT INSTRUCTIONS

## 2018-11-28 ENCOUNTER — TELEPHONE (OUTPATIENT)
Dept: ENDOCRINOLOGY | Facility: CLINIC | Age: 58
End: 2018-11-28

## 2018-11-28 DIAGNOSIS — M84.352A STRESS FRACTURE OF LEFT HIP: Primary | ICD-10-CM

## 2018-11-28 NOTE — TELEPHONE ENCOUNTER
----- Message from [employer] sent at 11/28/2018  4:57 PM EST -----  Regarding: RE: Referral Request  Contact: [phone]  Thuy,  If you can, that's great  My plate is a bit full right now with dealing with the stress fracture itself and hoping to avoid surgery in January  I'm not going to be able to get to [facility] much before February due to vacation, coverage, and schedule  Adeola Pierce  ----- Message -----  From: Roge Weaver PA-C  Sent: 11/27/2018  3:38 PM EST  To: [employer] Christopher  Subject: RE: Referral Request  I'm going to call to see if we can get you in sooner  If not, the options would be longer course of forteo (if insurance will approve-- they may only approve total 24 months) or starting prolia         ----- Message -----     From: [employer]  Trev Christopher     Sent: 11/20/2018  2:11 PM EST       To: Roge Weaver PA-C  Subject: RE: Referral Request    Theresa Bobby,  Apparently the first non-urgent appointment is June 2019  I am done with the Forteo at end of January      ----- Message -----  From: Roge Weaver PA-C  Sent: 11/20/18, 1:59 PM  To: [employer]  Trevrenu Christopher  Subject: RE: Referral Request    Kiel Mir,    Any of the endocrinologists in the metabolic bone center would be fine on a non-urgent basis  I would suggest to finish up what you have of the Forteo and call us when you are done and if you haven't seen [place] by then we will figure out a temporary plan     Thuy Pierce    ----- Message -----     From: [employer] Christopher     Sent: 11/19/2018  4:04 PM EST       To: Roge Weaver PA-C  Subject: Referral Request    Isadora Hurd,  I called [facility] to make an appointment but not booking out until next year sometime  I need clarification on who to make appointment with and the urgency of the appointment  Thanks     Happy Thanksscott Mir

## 2018-12-11 DIAGNOSIS — F41.1 GENERALIZED ANXIETY DISORDER: ICD-10-CM

## 2018-12-11 DIAGNOSIS — G47.09 OTHER INSOMNIA: ICD-10-CM

## 2018-12-11 RX ORDER — ZOLPIDEM TARTRATE 12.5 MG/1
12.5 TABLET, FILM COATED, EXTENDED RELEASE ORAL
Qty: 30 TABLET | Refills: 3 | Status: SHIPPED | OUTPATIENT
Start: 2018-12-11 | End: 2019-05-22 | Stop reason: SDUPTHER

## 2018-12-11 RX ORDER — ALPRAZOLAM 0.5 MG/1
0.5 TABLET ORAL 2 TIMES DAILY PRN
Qty: 60 TABLET | Refills: 3 | Status: SHIPPED | OUTPATIENT
Start: 2018-12-11 | End: 2019-05-22 | Stop reason: SDUPTHER

## 2018-12-13 ENCOUNTER — APPOINTMENT (OUTPATIENT)
Dept: LAB | Facility: CLINIC | Age: 58
End: 2018-12-13
Payer: COMMERCIAL

## 2018-12-13 ENCOUNTER — PATIENT MESSAGE (OUTPATIENT)
Dept: ENDOCRINOLOGY | Facility: CLINIC | Age: 58
End: 2018-12-13

## 2018-12-13 DIAGNOSIS — E03.9 HYPOTHYROIDISM, UNSPECIFIED TYPE: ICD-10-CM

## 2018-12-13 LAB
T4 FREE SERPL-MCNC: 1.34 NG/DL (ref 0.76–1.46)
TSH SERPL DL<=0.05 MIU/L-ACNC: 0.79 UIU/ML (ref 0.36–3.74)

## 2018-12-13 PROCEDURE — 36415 COLL VENOUS BLD VENIPUNCTURE: CPT

## 2018-12-13 PROCEDURE — 84443 ASSAY THYROID STIM HORMONE: CPT

## 2018-12-13 PROCEDURE — 84439 ASSAY OF FREE THYROXINE: CPT

## 2018-12-14 ENCOUNTER — TELEPHONE (OUTPATIENT)
Dept: ENDOCRINOLOGY | Facility: CLINIC | Age: 58
End: 2018-12-14

## 2018-12-14 DIAGNOSIS — E89.0 POSTOPERATIVE HYPOTHYROIDISM: ICD-10-CM

## 2018-12-14 RX ORDER — LEVOTHYROXINE SODIUM 112 MCG
TABLET ORAL DAILY
COMMUNITY
End: 2019-01-15 | Stop reason: SDUPTHER

## 2018-12-14 NOTE — TELEPHONE ENCOUNTER
----- Message from Ike Vasquez PA-C sent at 12/14/2018  7:38 AM EST -----  TSH has improved  Continue current dose of levothyroxine-- Tell her I haven't forgotten about scheduling her at DEPARTMENT [facility] [place] give her a call next week thanks!

## 2018-12-14 NOTE — TELEPHONE ENCOUNTER
Spoke to pt and provided lab results  She advised that she has been taking her Synthyroid 112 mcg as follows  1 tab Mon-Sat and 1/2 tab every other day   And this seems to be working for as far as sleeping, she's sleeping better and it has not been disruptive to her sleep pattern  And advised that you will call her next week

## 2018-12-14 NOTE — TELEPHONE ENCOUNTER
OK please update med list to what she has been taking-- seems like this is working well based on both symptoms and lab results

## 2018-12-18 ENCOUNTER — TELEPHONE (OUTPATIENT)
Dept: ENDOCRINOLOGY | Facility: CLINIC | Age: 58
End: 2018-12-18

## 2018-12-18 NOTE — TELEPHONE ENCOUNTER
Kiel Gilliland,    We are unable to get you a sooner appt with [facility]  I was able to get you in with a general endocrinologist through [facility]  Dr Prakash Rodriguez thought it may be a good idea to start there and then if needed, hopefully they can get you in quicker with one of the bone specialists  If this appt doesn't work, you can call to reschedule  You will need to get a CD of your DEXA Scan images through medical records and bring to appointment  They have access to [facility] everwhere, but I will send over some records  If you would prefer to start with the bone center would definitely get the 1 prolia injection prior to your appt  Friday Jan 4th--- 1:00PM  Bring Disc with DEXA Scan     Stanislav Gibson  [address] [place] [address]   [phone]  (there is a bone specialist in this office but she is booking out until October 2019)    I will fax records to [phone]    Thanks,  Melia Aguilar

## 2018-12-19 ENCOUNTER — TELEPHONE (OUTPATIENT)
Dept: OBGYN CLINIC | Facility: CLINIC | Age: 58
End: 2018-12-19

## 2018-12-21 NOTE — TELEPHONE ENCOUNTER
From: Anton Walker  To: Shane Hurd PA-C  Sent: 12/13/2018 8:14 PM EST  Subject: Prescription Question    [facility]   Haven't made appointment with [facility] yet  I would like you to go ahead and order the Prolia and start the prior authorization process     Thanks  Adeola

## 2018-12-24 ENCOUNTER — DOCUMENTATION (OUTPATIENT)
Dept: ENDOCRINOLOGY | Facility: CLINIC | Age: 58
End: 2018-12-24

## 2018-12-24 NOTE — PROGRESS NOTES
Please let patient know that prolia not covered at this time  Continue to finish up forteo for now  Let us know once fracture is healed and/or finished with forteo then we can do the prior auth or peer to peer if needed for prolia   (don't want to do it now because I see ortho is doing peer to peer for stress fracture, and concerned they will say not indicated while on forteo and while healing from an active fracture)  Also she is planning on seeing endo at [place] and we can wait for their opinion as well and if they can get her into metabolic/Bone clinic

## 2018-12-24 NOTE — PROGRESS NOTES
Submitted insurance verification for Prolia  Summary of benefits returned stating Prolia is not covered by the patient's medical benefits at this time  Please advise

## 2018-12-26 ENCOUNTER — HOSPITAL ENCOUNTER (OUTPATIENT)
Dept: MRI IMAGING | Facility: HOSPITAL | Age: 58
Discharge: HOME | End: 2018-12-26
Payer: COMMERCIAL

## 2018-12-26 DIAGNOSIS — M84.352A STRESS FRACTURE OF LEFT HIP: ICD-10-CM

## 2018-12-26 PROCEDURE — 73721 MRI JNT OF LWR EXTRE W/O DYE: CPT

## 2018-12-26 NOTE — PROGRESS NOTES
Spoke to pt and provided info from Suzette Lim  She states she has finished the [place]   And will keeps the office updated in regards to the fracture

## 2018-12-28 ENCOUNTER — APPOINTMENT (OUTPATIENT)
Dept: LAB | Facility: CLINIC | Age: 58
End: 2018-12-28
Payer: COMMERCIAL

## 2018-12-28 ENCOUNTER — TRANSCRIBE ORDERS (OUTPATIENT)
Dept: LAB | Facility: CLINIC | Age: 58
End: 2018-12-28

## 2018-12-28 ENCOUNTER — APPOINTMENT (OUTPATIENT)
Dept: RADIOLOGY | Facility: CLINIC | Age: 58
End: 2018-12-28
Payer: COMMERCIAL

## 2018-12-28 ENCOUNTER — TELEPHONE (OUTPATIENT)
Dept: OBGYN CLINIC | Facility: CLINIC | Age: 58
End: 2018-12-28

## 2018-12-28 ENCOUNTER — OFFICE VISIT (OUTPATIENT)
Dept: OBGYN CLINIC | Facility: CLINIC | Age: 58
End: 2018-12-28
Payer: COMMERCIAL

## 2018-12-28 VITALS
HEIGHT: 67 IN | BODY MASS INDEX: 24.01 KG/M2 | HEART RATE: 88 BPM | SYSTOLIC BLOOD PRESSURE: 128 MMHG | DIASTOLIC BLOOD PRESSURE: 86 MMHG | WEIGHT: 153 LBS

## 2018-12-28 DIAGNOSIS — M84.353G STRESS FRACTURE OF NECK OF FEMUR WITH DELAYED HEALING, SUBSEQUENT ENCOUNTER: ICD-10-CM

## 2018-12-28 DIAGNOSIS — M84.353G STRESS FRACTURE OF NECK OF FEMUR WITH DELAYED HEALING, SUBSEQUENT ENCOUNTER: Primary | ICD-10-CM

## 2018-12-28 DIAGNOSIS — M84.353A: ICD-10-CM

## 2018-12-28 DIAGNOSIS — Z98.890 HISTORY OF HIP SURGERY: Primary | ICD-10-CM

## 2018-12-28 DIAGNOSIS — M25.552 LEFT HIP PAIN: ICD-10-CM

## 2018-12-28 LAB
APTT PPP: 29 SECONDS (ref 26–38)
BASOPHILS # BLD AUTO: 0.02 THOUSANDS/ΜL (ref 0–0.1)
BASOPHILS NFR BLD AUTO: 0 % (ref 0–1)
EOSINOPHIL # BLD AUTO: 0.07 THOUSAND/ΜL (ref 0–0.61)
EOSINOPHIL NFR BLD AUTO: 1 % (ref 0–6)
ERYTHROCYTE [DISTWIDTH] IN BLOOD BY AUTOMATED COUNT: 13.3 % (ref 11.6–15.1)
HCT VFR BLD AUTO: 40 % (ref 34.8–46.1)
HGB BLD-MCNC: 13 G/DL (ref 11.5–15.4)
IMM GRANULOCYTES # BLD AUTO: 0.01 THOUSAND/UL (ref 0–0.2)
IMM GRANULOCYTES NFR BLD AUTO: 0 % (ref 0–2)
INR PPP: 0.81 (ref 0.86–1.17)
LYMPHOCYTES # BLD AUTO: 2.89 THOUSANDS/ΜL (ref 0.6–4.47)
LYMPHOCYTES NFR BLD AUTO: 47 % (ref 14–44)
MCH RBC QN AUTO: 30.6 PG (ref 26.8–34.3)
MCHC RBC AUTO-ENTMCNC: 32.5 G/DL (ref 31.4–37.4)
MCV RBC AUTO: 94 FL (ref 82–98)
MONOCYTES # BLD AUTO: 0.33 THOUSAND/ΜL (ref 0.17–1.22)
MONOCYTES NFR BLD AUTO: 5 % (ref 4–12)
NEUTROPHILS # BLD AUTO: 2.9 THOUSANDS/ΜL (ref 1.85–7.62)
NEUTS SEG NFR BLD AUTO: 47 % (ref 43–75)
NRBC BLD AUTO-RTO: 0 /100 WBCS
PLATELET # BLD AUTO: 232 THOUSANDS/UL (ref 149–390)
PMV BLD AUTO: 9.2 FL (ref 8.9–12.7)
PROTHROMBIN TIME: 11.3 SECONDS (ref 11.8–14.2)
RBC # BLD AUTO: 4.25 MILLION/UL (ref 3.81–5.12)
WBC # BLD AUTO: 6.22 THOUSAND/UL (ref 4.31–10.16)

## 2018-12-28 PROCEDURE — 85730 THROMBOPLASTIN TIME PARTIAL: CPT

## 2018-12-28 PROCEDURE — 36415 COLL VENOUS BLD VENIPUNCTURE: CPT

## 2018-12-28 PROCEDURE — 99215 OFFICE O/P EST HI 40 MIN: CPT | Performed by: ORTHOPAEDIC SURGERY

## 2018-12-28 PROCEDURE — 71046 X-RAY EXAM CHEST 2 VIEWS: CPT

## 2018-12-28 PROCEDURE — 85610 PROTHROMBIN TIME: CPT

## 2018-12-28 PROCEDURE — 85025 COMPLETE CBC W/AUTO DIFF WBC: CPT

## 2018-12-28 RX ORDER — ACETAMINOPHEN 325 MG/1
650 TABLET ORAL EVERY 6 HOURS PRN
COMMUNITY

## 2018-12-28 NOTE — PRE-PROCEDURE INSTRUCTIONS
My Surgical Experience    The following information was developed to assist you to prepare for your operation  What do I need to do before coming to the hospital?   Arrange for a responsible person to drive you to and from the hospital    Arrange care for your children at home  Children are not allowed in the recovery areas of the hospital   Plan to wear clothing that is easy to put on and take off  If you are having shoulder surgery, wear a shirt that buttons or zippers in the front  Bathing  o Shower the evening before and the morning of your surgery with an antibacterial soap  Please refer to the Pre Op Showering Instructions for Surgery Patients Sheet   o Remove nail polish and all body piercing jewelry  o Do not shave any body part for at least 24 hours before surgery-this includes face, arms, legs and upper body  Food  o Nothing to eat or drink after midnight the night before your surgery  This includes candy and chewing gum  o Exception: If your surgery is after 12:00pm (noon), you may have clear liquids such as 7-Up®, ginger ale, apple or cranberry juice, Jell-O®, water, or clear broth until 8:00 am  o Do not drink milk or juice with pulp on the morning before surgery  o Do not drink alcohol 24 hours before surgery  Medicine  o Follow instructions you received from your surgeon about which medicines you may take on the day of surgery  o If instructed to take medicine on the morning of surgery, take pills with just a small sip of water  Call your prescribing doctor for specific infroamtion on what to do if you take insulin    What should I bring to the hospital?    Bring:  Valentino Ester or a walker, if you have them, for foot or knee surgery   A list of the daily medicines, vitamins, minerals, herbals and nutritional supplements you take   Include the dosages of medicines and the time you take them each day   Glasses, dentures or hearing aids   Minimal clothing; you will be wearing hospital sleepwear   Photo ID; required to verify your identity   If you have a Living Will or Power of , bring a copy of the documents   If you have an ostomy, bring an extra pouch and any supplies you use    Do not bring   Medicines or inhalers   Money, valuables or jewelry    What other information should I know about the day of surgery?  Notify your surgeons if you develop a cold, sore throat, cough, fever, rash or any other illness   Report to the Ambulatory Surgical/Same Day Surgery Unit   You will be instructed to stop at Registration only if you have not been pre-registered   Inform your  fi they do not stay that they will be asked by the staff to leave a phone number where they can be reached   Be available to be reached before surgery  In the event the operating room schedule changes, you may be asked to come in earlier or later than expected    *It is important to tell your doctor and others involved in your health care if you are taking or have been taking any non-prescription drugs, vitamins, minerals, herbals or other nutritional supplements  Any of these may interact with some food or medicines and cause a reaction      Pre-Surgery Instructions:   Medication Instructions    acetaminophen (TYLENOL) 325 mg tablet Instructed patient per Anesthesia Guidelines   ALPRAZolam (XANAX) 0 5 mg tablet Instructed patient per Anesthesia Guidelines   Calcium Carbonate (CALCIUM 600) 1500 (600 CA) MG TABS Instructed patient per Anesthesia Guidelines   Cholecalciferol (VITAMIN D3) 5000 units CAPS Instructed patient per Anesthesia Guidelines   cyanocobalamin (VITAMIN B-12) 100 mcg tablet Instructed patient per Anesthesia Guidelines   loratadine (CLARITIN) 10 mg tablet Instructed patient per Anesthesia Guidelines   SYNTHROID 112 MCG tablet Instructed patient per Anesthesia Guidelines   zolpidem (AMBIEN CR) 12 5 MG CR tablet Instructed patient per Anesthesia Guidelines      To take synthroid and xanax(prn) a m  Of surgery

## 2018-12-28 NOTE — PROGRESS NOTES
Assessment/Plan:  1  Stress fracture of neck of femur with delayed healing, subsequent encounter  Case request operating room: OPEN REDUCTION W/ INTERNAL FIXATION (ORIF) HIP WITH PLATE AND SCREWS - Left hip dynamic hip screw    Ambulatory referral to Family Practice    CBC and differential    APTT    Protime-INR    Type and screen    MRSA culture    EKG 12 lead    XR chest pa & lateral    Case request operating room: OPEN REDUCTION W/ INTERNAL FIXATION (ORIF) HIP WITH PLATE AND SCREWS - Left hip dynamic hip screw   2  Left hip pain       Patient is here for follow-up regarding her compression sided left femoral neck fracture  She has been nonweightbearing for 6 weeks and although her clinical symptoms are minimal and she states she feels well the repeat MRI that was obtained this week demonstrates propagation of a incomplete fracture line and increased edema around the inferior aspect of the femoral neck suggesting that the stress fracture she he is not improving or healing and is in fact getting worse  This was similar to the clinical situation that happened on her contralateral side a year ago  With this constellation of clinical findings as well as an MRI that suggest worsening stress fracture with edema and now a visible or significant inferior incomplete fracture line I recommended that she undergo surgical fixation of her left femoral neck stress fracture as risk of complete fracture is increasing  She demonstrates understanding of this  She denies a history of DVT/PE, GI bleeding and peptic ulcer disease, and history of MRSA  She does report a history of thyroid cancer few years ago that was treated with thyroidectomy  She denies a history of cardiovascular or pulmonary issues and denies any chest pain shortness of breath or dyspnea on exertion    She is an active person that wishes to remain so and I feel that a DHS implant such as the 1 in her contralateral hip is the appropriate implant for her as this will allow for medial weight-bearing after surgery and has the most opportunity for her to remain active postoperatively  The risks and benefits of undergoing the surgery were discussed at length with the patient  Consents were signed and placed in the chart  Please see risk discussion below  She does have recent lab work and a recent evaluation by her PCP and she is in overall good health  I have asked her to obtained a brief clearance letter from her PCP in addition to some other labs preoperatively so that she may undergo this surgery in a more urgent nature rather than an elective 1  She will remain nonweightbearing until her surgical date  All of her questions were addressed  I look for to seeing her back postoperatively  Subjective:  6 week follow-up left femoral neck stress fracture    Patient ID: Carlos Peterson is a 63 y o  female  HPI  Patient is here for follow-up regarding her left femoral neck stress fracture  She has been compliant with her nonweightbearing and crutches use 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  She states clinically she does not have pain in her hip groin and has even had periods where she accidentally put more weight on and still did not have discomfort  She did undergo a repeat MRI prior to her appointment today and she is here for review that with me  Review of Systems   Constitutional: Positive for activity change  HENT: Negative  Eyes: Negative  Respiratory: Negative  Cardiovascular: Negative  Gastrointestinal: Negative  Endocrine: Negative  Musculoskeletal: Positive for arthralgias and gait problem  Skin: Negative  Psychiatric/Behavioral: Negative            Past Medical History:   Diagnosis Date    Anxiety     Cancer [facility])     thyroid    Closed rib fracture     x2    Disease of thyroid gland     cancer,had thyroidectomy    Fall     last assessed 5/14/16    Iron deficiency anemia     Viral gastroenteritis last assessed 12       Past Surgical History:   Procedure Laterality Date    APPENDECTOMY  2006    BREAST SURGERY Left 2001    CATARACT EXTRACTION Left     lens implant     SECTION      CHOLECYSTECTOMY      ESOPHAGOGASTRODUODENOSCOPY      diagnostic    GASTRIC BYPASS      HIP SURGERY Right     HYSTERECTOMY      total abdominal    JOINT REPLACEMENT      KNEE ARTHROSCOPY Bilateral     AZ COLONOSCOPY FLX DX W/COLLJ SPEC WHEN PFRMD N/A 2017    Procedure: EGD AND COLONOSCOPY;  Surgeon: Dre Melgoza MD;  [facility]: [facility]; Service: Gastroenterology    AZ FEMORAL FX, OPEN TX Right 10/10/2016    Procedure: FEMORAL NECK FIXATION ;  Surgeon: Carlin Patel MD;  [facility];  Service: Orthopedics    THYROIDECTOMY Bilateral     with LN dissectomy       Family History   Problem Relation Age of Onset    Heart disease Mother         cardiac disorder    Diabetes Mother     Cancer Father         of mouth    Bone cancer Maternal Grandfather     Brain cancer Maternal Grandfather     Lung cancer Maternal Grandfather     Skin cancer Maternal Grandfather     Coronary artery disease Family     Osteoporosis Family     Rheum arthritis Family     Breast cancer Cousin     Melanoma Cousin     Lung cancer Paternal Aunt     Throat cancer Brother        Social History     Occupational History    Not on file       Social History Main Topics    Smoking status: Never Smoker    Smokeless tobacco: Never Used    Alcohol use Yes      Comment: moderate,social    Drug use: No    Sexual activity: Not on file         Current Outpatient Prescriptions:     ALPRAZolam (XANAX) 0 5 mg tablet, Take 1 tablet (0 5 mg total) by mouth 2 (two) times a day as needed for anxiety, Disp: 60 tablet, Rfl: 3    Calcium Carbonate (CALCIUM 600) 1500 (600 CA) MG TABS, Take by mouth, Disp: , Rfl:     Cholecalciferol (VITAMIN D3) 5000 units CAPS, Take by mouth, Disp: , Rfl:     cyanocobalamin (VITAMIN B-12) 100 mcg tablet, Take by mouth, Disp: , Rfl:     loratadine (CLARITIN) 10 mg tablet, Take 10 mg by mouth daily, Disp: , Rfl:     SYNTHROID 112 MCG tablet, 1 tab Mon-Sat and 1/2 tab every other day  , Disp: , Rfl:     zolpidem (AMBIEN CR) 12 5 MG CR tablet, Take 1 tablet (12 5 mg total) by mouth daily at bedtime as needed for sleep, Disp: 30 tablet, Rfl: 3    FORTEO 600 MCG/2 4ML injection, Inject 0 08 mL (20 mcg total) under the skin daily for 90 days, Disp: 7 2 mL, Rfl: 5    Allergies   Allergen Reactions    Other     Scopolamine      Reaction Date: 10Aug2011;     Sulfa Antibiotics Hives    Sulfamethoxazole-Trimethoprim      Reaction Date: 10Aug2011;        Objective:  Vitals:    12/28/18 0940   BP: 128/86   Pulse: 88       Body mass index is 23 96 kg/m²  Left Hip Exam     Tenderness   The patient is experiencing no tenderness  Range of Motion   Internal Rotation: abnormal   External Rotation: abnormal   Abduction: abnormal     Muscle Strength   Abduction: 5/5   Adduction: 5/5   Flexion: 5/5     Other   Erythema: absent  Scars: absent  Sensation: normal  Pulse: present    Comments:  No leg length discrepancy  Mild discomfort with extreme of external and internal rotation and AB duction  Neurovascular intact            Physical Exam   Constitutional: She is oriented to person, place, and time  She appears well-developed  HENT:   Head: Atraumatic  Eyes: EOM are normal    Neck: Neck supple  Cardiovascular: Normal rate  Pulmonary/Chest: Effort normal    Musculoskeletal:   See orthopedic exam   Neurological: She is alert and oriented to person, place, and time  Skin: Skin is warm and dry  Psychiatric: She has a normal mood and affect  Nursing note and vitals reviewed  I have personally reviewed pertinent films in PACS  MRI from 12/26/2018 reviewed by me  It demonstrates an increase in femoral neck edema that has progressed since last MRI evaluation    There is a fracture line that is slightly more pronounced and incomplete in nature in the inferior aspect of the femoral neck  When compared to the MRI of her left hip 6 weeks ago it appears that the compression sided/inferior neck stress fracture has worsened in its severity  The patient was counseled in detail regarding the diagnosis, the treatment options available, the prognosis of each treatment option, the potential risks and complications  These are, but are not limited to; deep vein thrombosis, pulmonary embolism, neurologic and vascular injury, infection, hematoma, loosening, need for amputation, leg length discrepancy, reflex sympathetic dystrophy, persistent and chronic limp, chronic pain, acute pain, heterotopic ossification, stiffness of the hip, ankylosis of the hip, chronic leg swelling, fracture, screw or prosthetic perforation, medical morbidities, death, heart attack, and stroke  The patient's questions were answered in detail  The patient demonstrates understanding of these risks and wishes to proceed with surgery  Reyes Canard D O    Division of Adult Reconstruction  Department of [facility]

## 2018-12-30 ENCOUNTER — ANESTHESIA EVENT (OUTPATIENT)
Dept: PERIOP | Facility: HOSPITAL | Age: 58
End: 2018-12-30
Payer: COMMERCIAL

## 2018-12-31 ENCOUNTER — HOSPITAL ENCOUNTER (OUTPATIENT)
Facility: HOSPITAL | Age: 58
Setting detail: OBSERVATION
Discharge: HOME | End: 2019-01-01
Attending: ORTHOPAEDIC SURGERY | Admitting: ORTHOPAEDIC SURGERY
Payer: COMMERCIAL

## 2018-12-31 ENCOUNTER — HOSPITAL ENCOUNTER (OUTPATIENT)
Dept: RADIOLOGY | Facility: HOSPITAL | Age: 58
Setting detail: OUTPATIENT SURGERY
Discharge: HOME | End: 2018-12-31
Payer: COMMERCIAL

## 2018-12-31 ENCOUNTER — ANESTHESIA (OUTPATIENT)
Dept: PERIOP | Facility: HOSPITAL | Age: 58
End: 2018-12-31
Payer: COMMERCIAL

## 2018-12-31 DIAGNOSIS — M25.552 PAIN IN LEFT HIP: ICD-10-CM

## 2018-12-31 DIAGNOSIS — M84.30XA STRESS FRACTURE: ICD-10-CM

## 2018-12-31 DIAGNOSIS — M84.353G STRESS FRACTURE OF NECK OF FEMUR WITH DELAYED HEALING, SUBSEQUENT ENCOUNTER: Primary | ICD-10-CM

## 2018-12-31 LAB
ABO GROUP BLD: NORMAL
BLD GP AB SCN SERPL QL: NEGATIVE
RH BLD: NEGATIVE
SPECIMEN EXPIRATION DATE: NORMAL

## 2018-12-31 PROCEDURE — G8978 MOBILITY CURRENT STATUS: HCPCS

## 2018-12-31 PROCEDURE — 97163 PT EVAL HIGH COMPLEX 45 MIN: CPT

## 2018-12-31 PROCEDURE — 97110 THERAPEUTIC EXERCISES: CPT

## 2018-12-31 PROCEDURE — 27236 TREAT THIGH FRACTURE: CPT | Performed by: PHYSICIAN ASSISTANT

## 2018-12-31 PROCEDURE — 86901 BLOOD TYPING SEROLOGIC RH(D): CPT | Performed by: ORTHOPAEDIC SURGERY

## 2018-12-31 PROCEDURE — 86850 RBC ANTIBODY SCREEN: CPT | Performed by: ORTHOPAEDIC SURGERY

## 2018-12-31 PROCEDURE — C1713 ANCHOR/SCREW BN/BN,TIS/BN: HCPCS | Performed by: ORTHOPAEDIC SURGERY

## 2018-12-31 PROCEDURE — 27236 TREAT THIGH FRACTURE: CPT | Performed by: ORTHOPAEDIC SURGERY

## 2018-12-31 PROCEDURE — G8979 MOBILITY GOAL STATUS: HCPCS

## 2018-12-31 PROCEDURE — 87081 CULTURE SCREEN ONLY: CPT | Performed by: ORTHOPAEDIC SURGERY

## 2018-12-31 PROCEDURE — 73502 X-RAY EXAM HIP UNI 2-3 VIEWS: CPT

## 2018-12-31 PROCEDURE — 86900 BLOOD TYPING SEROLOGIC ABO: CPT | Performed by: ORTHOPAEDIC SURGERY

## 2018-12-31 PROCEDURE — C1769 GUIDE WIRE: HCPCS | Performed by: ORTHOPAEDIC SURGERY

## 2018-12-31 DEVICE — DHS®/DCS® ONE-STEP LAG SCREW 12.7MM THREAD/90MM-STERILE
Type: IMPLANTABLE DEVICE | Site: HIP | Status: FUNCTIONAL
Brand: DHS

## 2018-12-31 DEVICE — 135 DEG DHS PLATE-STD BARREL 2 HOLES/46MM-STERILE
Type: IMPLANTABLE DEVICE | Site: HIP | Status: FUNCTIONAL
Brand: DHS

## 2018-12-31 RX ORDER — BUPIVACAINE HYDROCHLORIDE 7.5 MG/ML
INJECTION, SOLUTION INTRASPINAL AS NEEDED
Status: DISCONTINUED | OUTPATIENT
Start: 2018-12-31 | End: 2018-12-31 | Stop reason: SURG

## 2018-12-31 RX ORDER — SODIUM CHLORIDE 9 MG/ML
INJECTION, SOLUTION INTRAVENOUS CONTINUOUS PRN
Status: DISCONTINUED | OUTPATIENT
Start: 2018-12-31 | End: 2018-12-31 | Stop reason: SURG

## 2018-12-31 RX ORDER — SODIUM CHLORIDE 9 MG/ML
75 INJECTION, SOLUTION INTRAVENOUS CONTINUOUS
Status: DISCONTINUED | OUTPATIENT
Start: 2018-12-31 | End: 2019-01-01 | Stop reason: ALTCHOICE

## 2018-12-31 RX ORDER — BUPIVACAINE HYDROCHLORIDE 5 MG/ML
INJECTION, SOLUTION EPIDURAL; INTRACAUDAL AS NEEDED
Status: DISCONTINUED | OUTPATIENT
Start: 2018-12-31 | End: 2018-12-31 | Stop reason: HOSPADM

## 2018-12-31 RX ORDER — PROPOFOL 10 MG/ML
INJECTION, EMULSION INTRAVENOUS CONTINUOUS PRN
Status: DISCONTINUED | OUTPATIENT
Start: 2018-12-31 | End: 2018-12-31 | Stop reason: SURG

## 2018-12-31 RX ORDER — HYDROMORPHONE HCL/PF 1 MG/ML
0.5 SYRINGE (ML) INJECTION
Status: DISCONTINUED | OUTPATIENT
Start: 2018-12-31 | End: 2019-01-01 | Stop reason: HOSPADM

## 2018-12-31 RX ORDER — ACETAMINOPHEN 325 MG/1
975 TABLET ORAL EVERY 8 HOURS SCHEDULED
Status: DISCONTINUED | OUTPATIENT
Start: 2018-12-31 | End: 2019-01-01 | Stop reason: HOSPADM

## 2018-12-31 RX ORDER — HYDROMORPHONE HCL/PF 1 MG/ML
1 SYRINGE (ML) INJECTION
Status: DISCONTINUED | OUTPATIENT
Start: 2018-12-31 | End: 2019-01-01 | Stop reason: HOSPADM

## 2018-12-31 RX ORDER — LEVOTHYROXINE SODIUM 112 UG/1
112 TABLET ORAL
Status: DISCONTINUED | OUTPATIENT
Start: 2019-01-01 | End: 2019-01-01 | Stop reason: HOSPADM

## 2018-12-31 RX ORDER — SODIUM CHLORIDE, SODIUM LACTATE, POTASSIUM CHLORIDE, CALCIUM CHLORIDE 600; 310; 30; 20 MG/100ML; MG/100ML; MG/100ML; MG/100ML
125 INJECTION, SOLUTION INTRAVENOUS CONTINUOUS
Status: DISCONTINUED | OUTPATIENT
Start: 2018-12-31 | End: 2019-01-01 | Stop reason: ALTCHOICE

## 2018-12-31 RX ORDER — PROPOFOL 10 MG/ML
INJECTION, EMULSION INTRAVENOUS AS NEEDED
Status: DISCONTINUED | OUTPATIENT
Start: 2018-12-31 | End: 2018-12-31 | Stop reason: SURG

## 2018-12-31 RX ORDER — OXYCODONE HYDROCHLORIDE 5 MG/1
10 TABLET ORAL EVERY 4 HOURS PRN
Status: DISCONTINUED | OUTPATIENT
Start: 2018-12-31 | End: 2019-01-01 | Stop reason: HOSPADM

## 2018-12-31 RX ORDER — OXYCODONE HYDROCHLORIDE 5 MG/1
5 TABLET ORAL EVERY 4 HOURS PRN
Status: DISCONTINUED | OUTPATIENT
Start: 2018-12-31 | End: 2019-01-01 | Stop reason: HOSPADM

## 2018-12-31 RX ORDER — DOCUSATE SODIUM 100 MG/1
100 CAPSULE, LIQUID FILLED ORAL 2 TIMES DAILY
Status: DISCONTINUED | OUTPATIENT
Start: 2018-12-31 | End: 2019-01-01 | Stop reason: HOSPADM

## 2018-12-31 RX ORDER — MAGNESIUM HYDROXIDE 1200 MG/15ML
LIQUID ORAL AS NEEDED
Status: DISCONTINUED | OUTPATIENT
Start: 2018-12-31 | End: 2018-12-31 | Stop reason: HOSPADM

## 2018-12-31 RX ORDER — FENTANYL CITRATE 50 UG/ML
INJECTION, SOLUTION INTRAMUSCULAR; INTRAVENOUS AS NEEDED
Status: DISCONTINUED | OUTPATIENT
Start: 2018-12-31 | End: 2018-12-31 | Stop reason: SURG

## 2018-12-31 RX ORDER — ONDANSETRON 2 MG/ML
4 INJECTION INTRAMUSCULAR; INTRAVENOUS EVERY 6 HOURS PRN
Status: DISCONTINUED | OUTPATIENT
Start: 2018-12-31 | End: 2019-01-01 | Stop reason: HOSPADM

## 2018-12-31 RX ORDER — CEFAZOLIN SODIUM 2 G/50ML
2000 SOLUTION INTRAVENOUS EVERY 8 HOURS
Status: COMPLETED | OUTPATIENT
Start: 2018-12-31 | End: 2019-01-01

## 2018-12-31 RX ORDER — LORATADINE 10 MG/1
10 TABLET ORAL DAILY
Status: DISCONTINUED | OUTPATIENT
Start: 2018-12-31 | End: 2019-01-01 | Stop reason: HOSPADM

## 2018-12-31 RX ORDER — LIDOCAINE HYDROCHLORIDE 10 MG/ML
INJECTION, SOLUTION INFILTRATION; PERINEURAL AS NEEDED
Status: DISCONTINUED | OUTPATIENT
Start: 2018-12-31 | End: 2018-12-31 | Stop reason: SURG

## 2018-12-31 RX ORDER — MIDAZOLAM HYDROCHLORIDE 1 MG/ML
INJECTION INTRAMUSCULAR; INTRAVENOUS AS NEEDED
Status: DISCONTINUED | OUTPATIENT
Start: 2018-12-31 | End: 2018-12-31 | Stop reason: SURG

## 2018-12-31 RX ORDER — ONDANSETRON 2 MG/ML
INJECTION INTRAMUSCULAR; INTRAVENOUS AS NEEDED
Status: DISCONTINUED | OUTPATIENT
Start: 2018-12-31 | End: 2018-12-31 | Stop reason: SURG

## 2018-12-31 RX ORDER — CEFAZOLIN SODIUM 2 G/50ML
2000 SOLUTION INTRAVENOUS ONCE
Status: COMPLETED | OUTPATIENT
Start: 2018-12-31 | End: 2018-12-31

## 2018-12-31 RX ORDER — MAGNESIUM HYDROXIDE/ALUMINUM HYDROXICE/SIMETHICONE 120; 1200; 1200 MG/30ML; MG/30ML; MG/30ML
30 SUSPENSION ORAL EVERY 6 HOURS PRN
Status: DISCONTINUED | OUTPATIENT
Start: 2018-12-31 | End: 2019-01-01 | Stop reason: HOSPADM

## 2018-12-31 RX ORDER — FENTANYL CITRATE/PF 50 MCG/ML
25 SYRINGE (ML) INJECTION
Status: DISCONTINUED | OUTPATIENT
Start: 2018-12-31 | End: 2018-12-31 | Stop reason: HOSPADM

## 2018-12-31 RX ADMIN — PROPOFOL 50 MG: 10 INJECTION, EMULSION INTRAVENOUS at 07:44

## 2018-12-31 RX ADMIN — ACETAMINOPHEN 975 MG: 325 TABLET, FILM COATED ORAL at 17:33

## 2018-12-31 RX ADMIN — ENOXAPARIN SODIUM 40 MG: 40 INJECTION SUBCUTANEOUS at 17:34

## 2018-12-31 RX ADMIN — MIDAZOLAM HYDROCHLORIDE 1 MG: 1 INJECTION, SOLUTION INTRAMUSCULAR; INTRAVENOUS at 07:40

## 2018-12-31 RX ADMIN — ACETAMINOPHEN 975 MG: 325 TABLET, FILM COATED ORAL at 11:34

## 2018-12-31 RX ADMIN — ACETAMINOPHEN 975 MG: 325 TABLET, FILM COATED ORAL at 23:18

## 2018-12-31 RX ADMIN — MIDAZOLAM HYDROCHLORIDE 2 MG: 1 INJECTION, SOLUTION INTRAMUSCULAR; INTRAVENOUS at 07:30

## 2018-12-31 RX ADMIN — ONDANSETRON 4 MG: 2 INJECTION INTRAMUSCULAR; INTRAVENOUS at 11:41

## 2018-12-31 RX ADMIN — PROPOFOL 50 MG: 10 INJECTION, EMULSION INTRAVENOUS at 08:33

## 2018-12-31 RX ADMIN — PROPOFOL 120 MCG/KG/MIN: 10 INJECTION, EMULSION INTRAVENOUS at 07:44

## 2018-12-31 RX ADMIN — DOCUSATE SODIUM 100 MG: 100 CAPSULE, LIQUID FILLED ORAL at 11:33

## 2018-12-31 RX ADMIN — SODIUM CHLORIDE, SODIUM LACTATE, POTASSIUM CHLORIDE, AND CALCIUM CHLORIDE 125 ML/HR: .6; .31; .03; .02 INJECTION, SOLUTION INTRAVENOUS at 06:41

## 2018-12-31 RX ADMIN — CEFAZOLIN SODIUM 2000 MG: 2 SOLUTION INTRAVENOUS at 07:43

## 2018-12-31 RX ADMIN — OXYCODONE HYDROCHLORIDE 10 MG: 5 TABLET ORAL at 15:25

## 2018-12-31 RX ADMIN — PROPOFOL 50 MG: 10 INJECTION, EMULSION INTRAVENOUS at 09:10

## 2018-12-31 RX ADMIN — FENTANYL CITRATE 50 MCG: 50 INJECTION, SOLUTION INTRAMUSCULAR; INTRAVENOUS at 07:35

## 2018-12-31 RX ADMIN — OXYCODONE HYDROCHLORIDE 5 MG: 5 TABLET ORAL at 23:29

## 2018-12-31 RX ADMIN — CEFAZOLIN SODIUM 2000 MG: 2 SOLUTION INTRAVENOUS at 15:26

## 2018-12-31 RX ADMIN — BUPIVACAINE HYDROCHLORIDE IN DEXTROSE 2 ML: 7.5 INJECTION, SOLUTION SUBARACHNOID at 07:43

## 2018-12-31 RX ADMIN — ONDANSETRON 4 MG: 2 INJECTION INTRAMUSCULAR; INTRAVENOUS at 08:05

## 2018-12-31 RX ADMIN — MIDAZOLAM HYDROCHLORIDE 1 MG: 1 INJECTION, SOLUTION INTRAMUSCULAR; INTRAVENOUS at 07:35

## 2018-12-31 RX ADMIN — CEFAZOLIN SODIUM 2000 MG: 2 SOLUTION INTRAVENOUS at 23:22

## 2018-12-31 RX ADMIN — DEXAMETHASONE SODIUM PHOSPHATE 8 MG: 10 INJECTION INTRAMUSCULAR; INTRAVENOUS at 08:05

## 2018-12-31 RX ADMIN — SODIUM CHLORIDE 75 ML/HR: 0.9 INJECTION, SOLUTION INTRAVENOUS at 11:35

## 2018-12-31 RX ADMIN — SODIUM CHLORIDE: 0.9 INJECTION, SOLUTION INTRAVENOUS at 08:50

## 2018-12-31 RX ADMIN — LIDOCAINE HYDROCHLORIDE 20 ML: 10 INJECTION, SOLUTION INFILTRATION; PERINEURAL at 07:44

## 2018-12-31 RX ADMIN — FENTANYL CITRATE 50 MCG: 50 INJECTION, SOLUTION INTRAMUSCULAR; INTRAVENOUS at 07:36

## 2018-12-31 RX ADMIN — LORATADINE 10 MG: 10 TABLET ORAL at 11:34

## 2018-12-31 RX ADMIN — HYDROMORPHONE HYDROCHLORIDE 0.5 MG: 1 INJECTION, SOLUTION INTRAMUSCULAR; INTRAVENOUS; SUBCUTANEOUS at 11:32

## 2018-12-31 NOTE — ANESTHESIA PROCEDURE NOTES
Spinal Block    Patient location during procedure: OR  Start time: 12/31/2018 7:27 AM  Reason for block: at surgeon's request and primary anesthetic  Staffing  Anesthesiologist: GAIL Moore  Performed: anesthesiologist   Preanesthetic Checklist  Completed: patient identified, site marked, surgical consent, pre-op evaluation, timeout performed, IV checked, risks and benefits discussed and monitors and equipment checked  Spinal Block  Patient position: sitting  Prep: Betadine  Patient monitoring: heart rate, cardiac monitor, continuous pulse ox and frequent blood pressure checks  Approach: midline  Location: L4-5  Injection technique: single-shot  Needle  Needle type: Quincke   Needle gauge: 25 G  Needle length: 10 cm  Assessment  Sensory level: T10  Injection Assessment:  negative aspiration for heme, no paresthesia on injection and positive aspiration for clear CSF    Post-procedure:  site cleaned  Additional Notes  Left sided scoliosis (lumbar area)  One attempt L4-5  0 75% bupivicaine with dextrose 2 ccs  Sitting up for  1 min after spinal in

## 2018-12-31 NOTE — SOCIAL WORK
SW met with pt to assess needs and discuss plans  Discussed goals of making sure pt's needs are met upon discharge, pt's preferences are taken into account  [place]  Pt has been relatively independent despite injury  Has a son who lives close by that visits daily as well as a very supportive network of friends  Pt has a wheelchair she has been using indoors and crutches for outdoors  Pt's plan is to return home and go for outpatient therapy  Per Dr Bethea Form pt will need to continue Lovenox after discharge  Pt's preferred pharmacy is [address]  SW contacted pharmacy to confirm that they have Lovenox in stock  They do and pharmacy will be open 9:00 am - 5:00 pm on New Year's Day  No discharge needs expressed or anticipated by pt at this time  Offered support in future if needed

## 2018-12-31 NOTE — Clinical Note
Kiel Jay, Can you please help Shala set up outpatient physical therapy for later this week? Thank you very much!

## 2018-12-31 NOTE — PROGRESS NOTES
Progress Note - Orthopedics   Leandrew Band 63 y o  female MRN: [id number]  Unit/Bed#: OR POOL Encounter: [id number]    Assessment:  1) POD#0 s/p left hip DHS    Plan:  Ancef 2 g IV x 2 for 24 hours postop  DVT prophylaxis:  Lovenox 40 mg subcu q day/SCD's/Ambulation  NWB LLE  PT/OT- NWB LLE with crutches  Analgesia PRN  Follow up AM labs  Monitor for spinal resolution  Dressing- monitor for drainage, dressing may stay in place x7 days postop  Discharge planning - plan for observation overnight for pain control  Patient lives alone  Patient does have arrangements for people to stay with her tomorrow and we will anticipate discharge to home tomorrow with outpatient physical therapy to start later this week  Weight bearing: NWB LLE    VTE Pharmacologic Prophylaxis: Enoxaparin (Lovenox)  VTE Mechanical Prophylaxis: sequential compression device    Subjective:  Patient seen examined in PACU  Pain controlled  Events of surgery discussed with the patient  Vitals: Blood pressure 163/93, pulse 75, temperature 98 4 °F (36 9 °C), temperature source Tympanic, resp  rate 18, height 5' 7" (1 702 m), weight 71 2 kg (157 lb), SpO2 99 %  ,Body mass index is 24 59 kg/m²  Intake/Output Summary (Last 24 hours) at 12/31/18 0936  Last data filed at 12/31/18 4184   Gross per 24 hour   Intake             1300 ml   Output              250 ml   Net             1050 ml       Invasive Devices     Peripheral Intravenous Line            Peripheral IV 12/31/18 Left Antecubital less than 1 day                Physical Exam: NAD  Ortho Exam: LLE: Dsg c/d/i, thigh soft, +DF/PF/EHL, +L3-S1 SILT but slightly decreased due to spinal, DP2+, foot warm    [place] D O    Division of Adult Reconstruction  Department of [facility]

## 2018-12-31 NOTE — OP NOTE
OPERATIVE REPORT  PATIENT NAME: Greg Martin    :  1960  MRN: [id number]  Pt Location: [facility] OR ROOM 03    SURGERY DATE: 2018    Surgeon(s) and Role:     * Amy Kincaid,  - Primary     * Yuriy Richards PA-C - Assisting, no qualified resident was available an assistant was needed for patient positioning, at soft tissue retraction, protection of vital structures, and to complete the case  Preop Diagnosis:  Stress fracture of neck of femur with delayed healing, subsequent encounter [M84 359G]    Post-Op Diagnosis Codes:     * Stress fracture of neck of femur with delayed healing, subsequent encounter [M84 359G]    Procedure:  Internal fixation left femoral neck    Specimen(s):  * No specimens in log *    Estimated Blood Loss:   250 mL    Drains:  None     Anesthesia Type:   Spinal with sedation    Antibiotics:  Ancef 2 g    Intravenous fluids:  1300 cc    Implants:  Synthes 135 degree DHS 2 hole plate with standard barrel, 12 7 mm x 90 mm lag screw, 4 5 mm cortical screw x2    Operative Indications:  Stress fracture of neck of femur with delayed healing, subsequent encounter [S32 359G]  Patient is a 60-year-old female that presented to the outpatient orthopedic practice for complaints of left hip pain  Ultimately after thorough evaluation she was found to have a compression sided left femoral neck stress fracture  She did have a similar injury on the contralateral side last year in the same location that ultimately underwent surgical fixation  With the findings based on clinical exam as well as MRI she was recommended to undergo conservative treatment in form of nonweightbearing on left lower extremity for 6-12 weeks    The patient returned after 6 weeks of treatment and states that her clinical symptoms had improved however the repeat MRI demonstrates progression of the inferior femoral neck stress fracture with increased edema and a more pronounced incomplete fracture line on the inferior neck   Patient did report compliance with her conservative treatment and nonweightbearing on left lower extremity however her MRI demonstrates that her clinical condition had worsened  With worsening of her clinical condition on MRI she was recommended to undergo surgical fixation of her femoral neck stress fracture  The risks and benefits of doing so were discussed at length and consents were signed and placed into the chart  The patient underwent preoperative evaluation and testing was cleared by her PCP for the procedure  Operative Findings: On fluoroscopic imaging prior to incision there was no definitive fracture line and the proximal femoral anatomy appeared normal fluoroscopy  A 2 hole standard DHS plate was successfully implanted without complication  Lag screw position was in the center center position of the femoral head with a tip to apex distance of approximately 25 mm  There were no complications  Complications:   None    Procedure and Technique:  Patient seen examined in the preoperative holding area the left lower extremity was identified marked  Patient was taken back to the operating room where there were administered spinal anesthesia with sedation in preparation for the procedure  The patient was administered 2 g of IV Ancef as a form perioperative antibiotics  Patient was positioned on the fracture table with left lower extremity in the traction boot after was well padded  The right lower extremity was flexed abduct it and placed into the well leg cordova with all bony prominences well padded  Fluoroscopy imaging demonstrates that there was no discrete fracture line in the proximal femoral anatomy was normal without sign of trauma or interval progression of the patient's underlying stress fracture identified on MR I  The left lower extremity was prepped and draped in standard sterile fashion    Final time-out was performed and all members of the operating room staff were in agreement that the correct patient was present and the intended procedure was going to be performed on the correct lower extremity  The proximal femoral anatomy was delineated using a marking pen and starting from the vastus ridge moving distally down the long axis of the femur a incision was delineated  This was down the mid axis of the lateral border of the femur  Using a 10 blade the skin was incised and hemostasis was achieved with electrocautery  The subcutaneous tissues were dissected using electrocautery and this was carried down to the tensor fascia  Tensor fascia was incised longitudinally and the vastus lateralis was exposed  The fascia of vastus lateralis was incised using electrocautery in the vastus was split in line with its fibers to expose the lateral cortex of the femur  There was a perforating encountered from the split of the vastus and it was cauterized and hemostasis was maintained  Using 135° angle targeting guide a 2 5 mm guide pin was inserted up in through the femoral neck into the appropriate position in the femoral head and femoral neck  The appropriate position was confirmed on both AP and lateral projections  Tip to apex distance at this point was approximately 20 mm  Guide pin was measured and it was felt that a 90 mm lag screw would be appropriate  Targeting guide was removed and the triple Reamer was assembled and set to a depth of 85 in order not to over ream the guide pin and causes displacement  The Reamer was taken to the desired depth  Next the proximal femur was tapped in preparation for the lag screw  Next the 90 mm lag screw DHS construct was inserted in the proximal femur impacted is final position on the lateral cortex of the femur  It is final position the tip of the lag screw had a tip to apex distance that was less than 25 mm  The 2 hole plate was then addressed with 4 5 mm cortical locking screws which were drilled measured and inserted in sequence  Both screws demonstrated excellent bicortical purchase  Final images were obtained in the AP and lateral projections and demonstrated appropriate position of the plate screw construct  The wound was copiously irrigated  The fascia over the vastus lateralis was closed in a running fashion using 2-0 Vicryl  The tensor fascial plane was closed with a running #1 Vicryl suture, the the deep subcutaneous tissues were closed using an undyed 0 Vicryl and the superficial subcutaneous tissues were closed using an undyed 2 0 Vicryl  Skin edges were reapproximated using a bidirectional barbed Monocryl suture and the skin edges were sealed using histoacryl  A silver impregnated Mepilex dressing was applied  Drapes were removed and the patient was awakened from the sedation and transferred to PACU in stable condition  There were no complications     I was present for the entire procedure    Patient Disposition:  PACU     SIGNATURE: Jazmin Fan DO  DATE: December 31, 2018  TIME: 9:36 AM

## 2018-12-31 NOTE — Clinical Note
I amended the outpatient PT order with Nicholas's recs for weight bearing  Please use new script when setting up PT appointment for her this week  Thank you!

## 2018-12-31 NOTE — PHYSICAL THERAPY NOTE
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 )   Comments Pt uses w/c inside, crutches outside, and a rolling stool at work;pt has been NWB LLE x 7 wks  Pt is able to drive  Restrictions/Precautions   LLE Weight Bearing Per Order NWB   Other Precautions Pain; Fall Risk   General   Additional Pertinent History Pt adm with stress fx femoral neck with delayed healing  Pt is s/p internal fixation of left femoral neck 12/31/18  Family/Caregiver Present No   Cognition   Overall Cognitive Status WFL   Arousal/Participation Cooperative   Orientation Level Oriented X4   Following Commands Follows all commands and directions without difficulty   RLE Assessment   RLE Assessment WFL  (4+/5)   LLE Assessment   LLE Assessment WFL  (AArom;hip/knee 2 to 2+/5, ankle 3+/5)   Bed Mobility   Supine to Sit 5  Supervision   Additional items Verbal cues   Sit to Supine 4  Minimal assistance   Additional items Assist x 1;LE management   Additional Comments Pt is able to partly bridge and left hips using UEs to change pad  Pt is still partly numb at time of eval from anesthesia and is incontinent of urine due to anesthesia  Pad changed multiple times under pt but pt lifting her hips and sliding pad underneath pt  Pt sat EOB x 3 mins  Pt with increased pain LLE sitting EOB, and pt was nauseous and difficulty   Trans not attempted due to pt with only part sensation back in pelvis and LEs  Transfers   Additional Comments Trans deferred as pt remains partly numb pelvis and LEs due to anesthesia  Ambulation/Elevation   Gait pattern (Gait deferred due to pt partly numb pelvis and LEs)   Balance   Static Sitting Fair   Activity Tolerance   Activity Tolerance Patient limited by pain;Treatment limited secondary to medical complications (Comment)  (pt remains numb due to anesthesia)   Assessment   Prognosis Good   Problem List Decreased strength;Decreased range of motion;Decreased endurance; Impaired balance;Decreased mobility; Decreased coordination;Pain;Orthopedic restrictions   Assessment Patient seen for Physical Therapy evaluation  Patient admitted with Stress fracture of femoral neck  Comorbidities affecting patient's physical performance include: femoral neck stress fx, OA knees, anemia, lumbar scoliosis, chronic left knee pain  Personal factors affecting patient at time of initial evaluation include: lives in one story house, ambulating with assistive device, stairs to enter home, inability to ambulate household distances, inability to navigate community distances, inability to navigate level surfaces without external assistance, inability to perform dynamic tasks in community, limited home support and anxiety  Prior to admission, patient was independent with functional mobility with crutches/wc/rolling stool, independent with ADLS, independent with IADLS, living alone in one story home with 3 steps to enter, ambulating household distance, ambulating community distances and works full time  Please find objective findings from Physical Therapy assessment regarding body systems outlined above with impairments and limitations including weakness, decreased ROM, impaired balance, decreased endurance, impaired coordination, gait deviations, pain, decreased activity tolerance, decreased functional mobility tolerance, fall risk and orthopedic restrictions    The Barthel Index was used as a functional outcome tool presenting with a score of 30 today indicating marked limitations of functional mobility and ADLS  Patient's clinical presentation is currently unstable/unpredictable as seen in patient's presentation of vital sign response, changing level of pain, increased fall risk, new onset of impairment of functional mobility, decreased endurance and new onset of weakness  Pt would benefit from continued Physical Therapy treatment to address deficits as defined above and maximize level of functional mobility  As demonstrated by objective findings, the assigned level of complexity for this evaluation is high  Goals   Patient Goals go home tomorrow   STG Expiration Date 01/07/19   Short Term Goal #1 bed mob - I; trans - S   Short Term Goal #2 pt will amb with crutches NWB LLE functional household distances; up/down 3 steps with crutches and rail - S so pt can enter/exit her home   LTG Expiration Date 01/14/19   Long Term Goal #1 Pt will return to prior independent functional mobility with crutches, NWB LLE functional household and community distances; balance with crutches - F+ for safe mobility and to decrease fall risk   Long Term Goal #2 up/down 3 steps with rail and crutches - I NWB LLE; arom LLE WFL; strength hip/knee - 3 to 3+/5   Plan   Treatment/Interventions ADL retraining;Functional transfer training;LE strengthening/ROM; Elevations; Therapeutic exercise; Endurance training;Patient/family training;Equipment eval/education; Bed mobility;Gait training   PT Frequency Twice a day   Recommendation   Recommendation Home with family support  (F/U PT per ortho MD, home vs OP)   Equipment Recommended (Pt has all DME needs)   Barthel Index   Feeding 10   Bathing 0   Grooming Score 0   Dressing Score 5   Bladder Score 0   Bowels Score 0   Toilet Use Score 5   Transfers (Bed/Chair) Score 10   Mobility (Level Surface) Score 0   Stairs Score 0   Barthel Index Score 30   Licensure   [place] License Number Joseph Johnson, [place] [id number]     Time In:1525  Time HQM:8276  Total Time: 10 mins      S:  "I can't believe how bad the pain is in my quad "  O:  Pt performed AA heel slides x 5 reps, hip abd x 10 reps, quad and glut sets x 10 and APs x 10  A:  Pt will cont to benefit from skilled PT services to increase pt's strength, arom, mobility, gait and endurance  P:  Cont per PT POC  DCP - home with family support and use of crutches and w/c as previous, NWB LLE  F/U PT per ortho MD, home vs OP      Joseph Johnson, [place]   [id number]

## 2018-12-31 NOTE — INTERVAL H&P NOTE
H&P reviewed  After examining the patient this morning, Dr Keiko Holland found no changes in the patients condition since the H&P had been written   Patient agreeable to proceed and all questions addressed

## 2018-12-31 NOTE — PLAN OF CARE
Problem: DISCHARGE PLANNING - CARE MANAGEMENT  Goal: Discharge to post-acute care or home with appropriate resources  INTERVENTIONS:  - Conduct assessment to determine patient/family and health care team treatment goals, and need for post-acute services based on payer coverage, community resources, and patient preferences, and barriers to discharge  - Address psychosocial, clinical, and financial barriers to discharge as identified in assessment in conjunction with the patient/family and health care team  - Arrange appropriate level of post-acute services according to patient's   needs and preference and payer coverage in collaboration with the physician and health care team  - Communicate with and update the patient/family, physician, and health care team regarding progress on the discharge plan  - Arrange appropriate transportation to post-acute venues    RETURN HOME WITH OUTPATIENT THERAPY  Outcome: Progressing  Pt's plan is to return home and go for outpatient therapy  Pt will need to continue Lovenox after discharge  Pt's preferred pharmacy is [address]  Contacted pharmacy to confirm that they have Lovenox in stock  They do and pharmacy will be open 9:00 am - 5:00 pm on New Year's Day  No discharge needs expressed or anticipated by pt at this time

## 2018-12-31 NOTE — PROGRESS NOTES
Outpatient PT order amended to show that she should be non-weight bearing on left lower extremity for 2 weeks post-op, then 50% for 2 weeks, and progress to weight bearing as tolerated

## 2018-12-31 NOTE — H&P (VIEW-ONLY)
Assessment/Plan:  1  Stress fracture of neck of femur with delayed healing, subsequent encounter  Case request operating room: OPEN REDUCTION W/ INTERNAL FIXATION (ORIF) HIP WITH PLATE AND SCREWS - Left hip dynamic hip screw    Ambulatory referral to Family Practice    CBC and differential    APTT    Protime-INR    Type and screen    MRSA culture    EKG 12 lead    XR chest pa & lateral    Case request operating room: OPEN REDUCTION W/ INTERNAL FIXATION (ORIF) HIP WITH PLATE AND SCREWS - Left hip dynamic hip screw   2  Left hip pain       Patient is here for follow-up regarding her compression sided left femoral neck fracture  She has been nonweightbearing for 6 weeks and although her clinical symptoms are minimal and she states she feels well the repeat MRI that was obtained this week demonstrates propagation of a incomplete fracture line and increased edema around the inferior aspect of the femoral neck suggesting that the stress fracture she he is not improving or healing and is in fact getting worse  This was similar to the clinical situation that happened on her contralateral side a year ago  With this constellation of clinical findings as well as an MRI that suggest worsening stress fracture with edema and now a visible or significant inferior incomplete fracture line I recommended that she undergo surgical fixation of her left femoral neck stress fracture as risk of complete fracture is increasing  She demonstrates understanding of this  She denies a history of DVT/PE, GI bleeding and peptic ulcer disease, and history of MRSA  She does report a history of thyroid cancer few years ago that was treated with thyroidectomy  She denies a history of cardiovascular or pulmonary issues and denies any chest pain shortness of breath or dyspnea on exertion    She is an active person that wishes to remain so and I feel that a DHS implant such as the 1 in her contralateral hip is the appropriate implant for her as this will allow for medial weight-bearing after surgery and has the most opportunity for her to remain active postoperatively  The risks and benefits of undergoing the surgery were discussed at length with the patient  Consents were signed and placed in the chart  Please see risk discussion below  She does have recent lab work and a recent evaluation by her PCP and she is in overall good health  I have asked her to obtained a brief clearance letter from her PCP in addition to some other labs preoperatively so that she may undergo this surgery in a more urgent nature rather than an elective 1  She will remain nonweightbearing until her surgical date  All of her questions were addressed  I look for to seeing her back postoperatively  Subjective:  6 week follow-up left femoral neck stress fracture    Patient ID: Arely Barber is a 63 y o  female  HPI  Patient is here for follow-up regarding her left femoral neck stress fracture  She has been compliant with her nonweightbearing and crutches use 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  She states clinically she does not have pain in her hip groin and has even had periods where she accidentally put more weight on and still did not have discomfort  She did undergo a repeat MRI prior to her appointment today and she is here for review that with me  Review of Systems   Constitutional: Positive for activity change  HENT: Negative  Eyes: Negative  Respiratory: Negative  Cardiovascular: Negative  Gastrointestinal: Negative  Endocrine: Negative  Musculoskeletal: Positive for arthralgias and gait problem  Skin: Negative  Psychiatric/Behavioral: Negative            Past Medical History:   Diagnosis Date    Anxiety     Cancer [facility])     thyroid    Closed rib fracture     x2    Disease of thyroid gland     cancer,had thyroidectomy    Fall     last assessed 5/14/16    Iron deficiency anemia     Viral gastroenteritis last assessed 12       Past Surgical History:   Procedure Laterality Date    APPENDECTOMY  2006    BREAST SURGERY Left 2001    CATARACT EXTRACTION Left     lens implant     SECTION      CHOLECYSTECTOMY      ESOPHAGOGASTRODUODENOSCOPY      diagnostic    GASTRIC BYPASS      HIP SURGERY Right     HYSTERECTOMY      total abdominal    JOINT REPLACEMENT      KNEE ARTHROSCOPY Bilateral     AK COLONOSCOPY FLX DX W/COLLJ SPEC WHEN PFRMD N/A 2017    Procedure: EGD AND COLONOSCOPY;  Surgeon: Fabienne Lamas MD;  [facility]; Service: Gastroenterology    AK FEMORAL FX, OPEN TX Right 10/10/2016    Procedure: FEMORAL NECK FIXATION ;  Surgeon: Cirilo Harvey MD;  [facility];  Service: Orthopedics    THYROIDECTOMY Bilateral     with LN dissectomy       Family History   Problem Relation Age of Onset    Heart disease Mother         cardiac disorder    Diabetes Mother     Cancer Father         of mouth    Bone cancer Maternal Grandfather     Brain cancer Maternal Grandfather     Lung cancer Maternal Grandfather     Skin cancer Maternal Grandfather     Coronary artery disease Family     Osteoporosis Family     Rheum arthritis Family     Breast cancer Cousin     Melanoma Cousin     Lung cancer Paternal Aunt     Throat cancer Brother        Social History     Occupational History    Not on file       Social History Main Topics    Smoking status: Never Smoker    Smokeless tobacco: Never Used    Alcohol use Yes      Comment: moderate,social    Drug use: No    Sexual activity: Not on file         Current Outpatient Prescriptions:     ALPRAZolam (XANAX) 0 5 mg tablet, Take 1 tablet (0 5 mg total) by mouth 2 (two) times a day as needed for anxiety, Disp: 60 tablet, Rfl: 3    Calcium Carbonate (CALCIUM 600) 1500 (600 CA) MG TABS, Take by mouth, Disp: , Rfl:     Cholecalciferol (VITAMIN D3) 5000 units CAPS, Take by mouth, Disp: , Rfl:     cyanocobalamin (VITAMIN B-12) 100 mcg tablet, Take by mouth, Disp: , Rfl:     loratadine (CLARITIN) 10 mg tablet, Take 10 mg by mouth daily, Disp: , Rfl:     SYNTHROID 112 MCG tablet, 1 tab Mon-Sat and 1/2 tab every other day  , Disp: , Rfl:     zolpidem (AMBIEN CR) 12 5 MG CR tablet, Take 1 tablet (12 5 mg total) by mouth daily at bedtime as needed for sleep, Disp: 30 tablet, Rfl: 3    FORTEO 600 MCG/2 4ML injection, Inject 0 08 mL (20 mcg total) under the skin daily for 90 days, Disp: 7 2 mL, Rfl: 5    Allergies   Allergen Reactions    Other     Scopolamine      Reaction Date: 10Aug2011;     Sulfa Antibiotics Hives    Sulfamethoxazole-Trimethoprim      Reaction Date: 10Aug2011;        Objective:  Vitals:    12/28/18 0940   BP: 128/86   Pulse: 88       Body mass index is 23 96 kg/m²  Left Hip Exam     Tenderness   The patient is experiencing no tenderness  Range of Motion   Internal Rotation: abnormal   External Rotation: abnormal   Abduction: abnormal     Muscle Strength   Abduction: 5/5   Adduction: 5/5   Flexion: 5/5     Other   Erythema: absent  Scars: absent  Sensation: normal  Pulse: present    Comments:  No leg length discrepancy  Mild discomfort with extreme of external and internal rotation and AB duction  Neurovascular intact            Physical Exam   Constitutional: She is oriented to person, place, and time  She appears well-developed  HENT:   Head: Atraumatic  Eyes: EOM are normal    Neck: Neck supple  Cardiovascular: Normal rate  Pulmonary/Chest: Effort normal    Musculoskeletal:   See orthopedic exam   Neurological: She is alert and oriented to person, place, and time  Skin: Skin is warm and dry  Psychiatric: She has a normal mood and affect  Nursing note and vitals reviewed  I have personally reviewed pertinent films in PACS  MRI from 12/26/2018 reviewed by me  It demonstrates an increase in femoral neck edema that has progressed since last MRI evaluation    There is a fracture line that is slightly more pronounced and incomplete in nature in the inferior aspect of the femoral neck  When compared to the MRI of her left hip 6 weeks ago it appears that the compression sided/inferior neck stress fracture has worsened in its severity  The patient was counseled in detail regarding the diagnosis, the treatment options available, the prognosis of each treatment option, the potential risks and complications  These are, but are not limited to; deep vein thrombosis, pulmonary embolism, neurologic and vascular injury, infection, hematoma, loosening, need for amputation, leg length discrepancy, reflex sympathetic dystrophy, persistent and chronic limp, chronic pain, acute pain, heterotopic ossification, stiffness of the hip, ankylosis of the hip, chronic leg swelling, fracture, screw or prosthetic perforation, medical morbidities, death, heart attack, and stroke  The patient's questions were answered in detail  The patient demonstrates understanding of these risks and wishes to proceed with surgery  Emigdio LOVELL    Division of Adult Reconstruction  Department of [facility]

## 2018-12-31 NOTE — PROGRESS NOTES
Per Dr Kelsy Rae, we will provide her with an outpatient prescription for 4 weeks of Lovenox subcutaneously and an outpatient physical therapy prescription  She should begin the Lovenox immediately upon discharge and physical therapy later this week  We will see her back in 2 weeks as planned

## 2018-12-31 NOTE — ANESTHESIA PREPROCEDURE EVALUATION
Review of Systems/Medical History  Patient summary reviewed  Chart reviewed  No history of anesthetic complications     Cardiovascular  EKG reviewed, Negative cardio ROS Exercise tolerance (METS): good,    Comment: Hx PVCs/PACs,  Pulmonary  Negative pulmonary ROS No sleep apnea ,        GI/Hepatic    GERD well controlled,   Comment: S/p gastric bypass     Negative  ROS        Endo/Other  History of thyroid disease (hx thyroid CA) , hypothyroidism,      GYN    Hysterectomy,        Hematology  Anemia ,     Musculoskeletal  Negative musculoskeletal ROS   Comment: Femoral neck fracture Arthritis     Neurology  Negative neurology ROS      Psychology   Anxiety,              Physical Exam    Airway    Mallampati score: II  TM Distance: >3 FB  Neck ROM: full     Dental   No notable dental hx     Cardiovascular  Comment: Negative ROS,     Pulmonary      Other Findings        Anesthesia Plan  ASA Score- 2     Anesthesia Type- spinal with ASA Monitors  Additional Monitors:   Airway Plan:     Comment: Spinal with TIVA vs  GA with LMA as backup  IV, antiemetics  Plan Factors-    Induction- intravenous  Postoperative Plan-     Informed Consent- Anesthetic plan and risks discussed with patient  I personally reviewed this patient with the CRNA  Discussed and agreed on the Anesthesia Plan with the CRNA  Sam Kong

## 2019-01-01 VITALS
SYSTOLIC BLOOD PRESSURE: 152 MMHG | WEIGHT: 157 LBS | HEART RATE: 88 BPM | HEIGHT: 67 IN | RESPIRATION RATE: 20 BRPM | DIASTOLIC BLOOD PRESSURE: 75 MMHG | OXYGEN SATURATION: 99 % | TEMPERATURE: 98.9 F | BODY MASS INDEX: 24.64 KG/M2

## 2019-01-01 LAB
ANION GAP SERPL CALCULATED.3IONS-SCNC: 9 MMOL/L (ref 4–13)
BASOPHILS # BLD AUTO: 0.01 THOUSANDS/ΜL (ref 0–0.1)
BASOPHILS NFR BLD AUTO: 0 % (ref 0–1)
BUN SERPL-MCNC: 15 MG/DL (ref 5–25)
CALCIUM SERPL-MCNC: 8.4 MG/DL (ref 8.3–10.1)
CHLORIDE SERPL-SCNC: 106 MMOL/L (ref 100–108)
CO2 SERPL-SCNC: 26 MMOL/L (ref 21–32)
CREAT SERPL-MCNC: 0.87 MG/DL (ref 0.6–1.3)
EOSINOPHIL # BLD AUTO: 0 THOUSAND/ΜL (ref 0–0.61)
EOSINOPHIL NFR BLD AUTO: 0 % (ref 0–6)
ERYTHROCYTE [DISTWIDTH] IN BLOOD BY AUTOMATED COUNT: 13.3 % (ref 11.6–15.1)
GFR SERPL CREATININE-BSD FRML MDRD: 74 ML/MIN/1.73SQ M
GLUCOSE SERPL-MCNC: 104 MG/DL (ref 65–140)
HCT VFR BLD AUTO: 32.7 % (ref 34.8–46.1)
HGB BLD-MCNC: 10.8 G/DL (ref 11.5–15.4)
IMM GRANULOCYTES # BLD AUTO: 0.02 THOUSAND/UL (ref 0–0.2)
IMM GRANULOCYTES NFR BLD AUTO: 0 % (ref 0–2)
LYMPHOCYTES # BLD AUTO: 2.93 THOUSANDS/ΜL (ref 0.6–4.47)
LYMPHOCYTES NFR BLD AUTO: 34 % (ref 14–44)
MCH RBC QN AUTO: 30.9 PG (ref 26.8–34.3)
MCHC RBC AUTO-ENTMCNC: 33 G/DL (ref 31.4–37.4)
MCV RBC AUTO: 93 FL (ref 82–98)
MONOCYTES # BLD AUTO: 0.58 THOUSAND/ΜL (ref 0.17–1.22)
MONOCYTES NFR BLD AUTO: 7 % (ref 4–12)
MRSA NOSE QL CULT: NORMAL
NEUTROPHILS # BLD AUTO: 5.05 THOUSANDS/ΜL (ref 1.85–7.62)
NEUTS SEG NFR BLD AUTO: 59 % (ref 43–75)
NRBC BLD AUTO-RTO: 0 /100 WBCS
PLATELET # BLD AUTO: 220 THOUSANDS/UL (ref 149–390)
PMV BLD AUTO: 8.6 FL (ref 8.9–12.7)
POTASSIUM SERPL-SCNC: 4.4 MMOL/L (ref 3.5–5.3)
RBC # BLD AUTO: 3.5 MILLION/UL (ref 3.81–5.12)
SODIUM SERPL-SCNC: 141 MMOL/L (ref 136–145)
WBC # BLD AUTO: 8.59 THOUSAND/UL (ref 4.31–10.16)

## 2019-01-01 PROCEDURE — 80048 BASIC METABOLIC PNL TOTAL CA: CPT | Performed by: ORTHOPAEDIC SURGERY

## 2019-01-01 PROCEDURE — G8988 SELF CARE GOAL STATUS: HCPCS

## 2019-01-01 PROCEDURE — G8987 SELF CARE CURRENT STATUS: HCPCS

## 2019-01-01 PROCEDURE — 85025 COMPLETE CBC W/AUTO DIFF WBC: CPT | Performed by: ORTHOPAEDIC SURGERY

## 2019-01-01 PROCEDURE — 99024 POSTOP FOLLOW-UP VISIT: CPT | Performed by: PHYSICIAN ASSISTANT

## 2019-01-01 PROCEDURE — 97530 THERAPEUTIC ACTIVITIES: CPT

## 2019-01-01 PROCEDURE — 97166 OT EVAL MOD COMPLEX 45 MIN: CPT

## 2019-01-01 RX ORDER — OXYCODONE HYDROCHLORIDE 5 MG/1
5 TABLET ORAL EVERY 4 HOURS PRN
Qty: 30 TABLET | Refills: 0 | Status: SHIPPED | OUTPATIENT
Start: 2019-01-01 | End: 2019-01-06

## 2019-01-01 RX ADMIN — ENOXAPARIN SODIUM 40 MG: 40 INJECTION SUBCUTANEOUS at 09:51

## 2019-01-01 RX ADMIN — LEVOTHYROXINE SODIUM 112 MCG: 112 TABLET ORAL at 05:13

## 2019-01-01 RX ADMIN — LORATADINE 10 MG: 10 TABLET ORAL at 09:50

## 2019-01-01 RX ADMIN — ACETAMINOPHEN 975 MG: 325 TABLET, FILM COATED ORAL at 05:12

## 2019-01-01 NOTE — PLAN OF CARE
DISCHARGE PLANNING     Discharge to home or other facility with appropriate resources Adequate for Discharge        DISCHARGE PLANNING - CARE MANAGEMENT     Discharge to post-acute care or home with appropriate resources Adequate for Discharge        Knowledge Deficit     Patient/family/caregiver demonstrates understanding of disease process, treatment plan, medications, and discharge instructions Adequate for Discharge        PAIN - ADULT     Verbalizes/displays adequate comfort level or baseline comfort level Adequate for Discharge        Potential for Falls     Patient will remain free of falls Adequate for Discharge        SAFETY ADULT     Maintain or return to baseline ADL function Adequate for Discharge     Maintain or return mobility status to optimal level Adequate for Discharge

## 2019-01-01 NOTE — OCCUPATIONAL THERAPY NOTE
OT EVALUATION     01/01/19 1005   Note Type   Note type Eval only   Restrictions/Precautions   LLE Weight Bearing Per Order NWB   Other Precautions Pain   Pain Assessment   Pain Assessment 0-10   Pain Score 7   Pain Type Surgical pain   Pain Location Leg;Hip   Pain Orientation Left   Home Living   Type of Home House  (3 VLAD)   Home Layout One level; Laundry in basement   Bathroom Shower/Tub Tub/shower unit   Home Equipment Wheelchair-manual;Crutches   Prior Function   Level of [place] Independent with ADLs and functional mobility   Lives With Alone   Receives Help From Family   ADL Assistance Independent   IADLs Independent   Vocational Full time employment  (PA)   Comments Pt uses w/c inside, crutches outside, and a rolling stool at work;pt has been NWB LLE x 7 wks  Pt is able to drive     Lifestyle   Intrinsic Gratification Travel with girlfriends, run, dogs   ADL   Eating Assistance 7  Independent   Grooming Assistance 7  Independent   UB Bathing Assistance 7  Independent   LB [address] 5  Supervision/Setup   UB Dressing Assistance 7  Independent    [address]  5  Supervision/Setup   Bed Mobility   Supine to Sit 7  Independent   Sit to Supine 7  Independent   Transfers   Sit to Stand 7  Independent   Stand to Sit 7  Independent   Stand pivot 7  Independent   Toilet transfer 5  Supervision   Functional Mobility   Functional Mobility 7  Independent   Additional Comments 20 feet x 2 to and from bathroom    Additional items Crutches   Balance   Static Sitting Good   Dynamic Sitting Good   Static Standing Fair +   Dynamic Standing Fair   Activity Tolerance   Activity Tolerance Patient limited by pain   Nurse Made Aware yes, independent with mobility with crutches to bathroom    RUE Assessment   RUE Assessment WNL   LUE Assessment   LUE Assessment WNL   Cognition   Overall Cognitive Status WFL   Orientation Level Oriented X4   Following Commands Follows all commands and directions without difficulty   Assessment   Limitation Decreased ADL status; Decreased self-care trans;Decreased high-level ADLs; Decreased endurance   Prognosis Good   Assessment Patient evaluated by Occupational Therapy  Patient admitted with Stress fracture of femoral neck s/p L hip DHS, NWB LLE  The patients occupational profile, medical and therapy history includes a expanded review of medical and/or therapy records and additional review of physical, cognitive, or psychosocial history related to current functional performance  Comorbidities affecting functional mobility and ADLS include: anemia, anxiety, falls and cancer  Prior to admission, patient was independent with functional mobility with crutches, wheelchair, independent with ADLS, independent with IADLS and works full time  The evaluation identifies the following performance deficits: impaired balance, decreased ADLS, decreased IADLS, pain and ortheopedic restrictions, that result in activity limitations and/or participation restrictions  This evaluation requires clinical decision making of moderate complexity, because the patient may present with comorbidities that affect occupational performance and required minimal or moderate modification of tasks or assistance with the consideration of several treatment options  The Barthel Index was used as a functional outcome tool presenting with a score of 65, indicating moderate limitations of functional mobility and ADLS  Patient will benefit from skilled Occupational Therapy services to address above deficits and facilitate a safe return to prior level of function     Goals   Patient Goals go home   STG Time Frame (1-7 days)   Short Term Goal  Goals established to promote patient goal of going home:  Patient will increase standing tolerance to 3 minutes during ADL task to decrease assistance level and decrease fall risk;  Patient will improve functional activity tolerance to 10 minutes of sustained functional tasks to increase participation in basic self-care and decrease assistance level  LTG Time Frame (8-14 days)   Long Term Goal Goals established to promote patient goal of going home:  Patient will increase standing tolerance to 6 minutes during ADL task to decrease assistance level and decrease fall risk;  Patient will improve functional activity tolerance to 20 minutes of sustained functional tasks to increase participation in basic self-care and decrease assistance level  Functional Transfer Goals   Pt Will Transfer To Toilet With toilet riser  (STG independent)   ADL Goals   Pt Will Perform Bathing (LTG independent )   Pt Will Perform LE Dressing (STG independent )   Pt Will Perform Toileting (STG independent )   Plan   Treatment Interventions ADL retraining;Functional transfer training;Patient/family training;Equipment evaluation/education; Activityengagement; Compensatory technique education   Goal Expiration Date 01/15/19   OT Frequency 3-5x/wk   Recommendation   OT Discharge Recommendation Home independent   Equipment Recommended (toilet raiser, pt ordered it on [employer])   Barthel Index   Feeding 10   Bathing 0   Grooming Score 5   Dressing Score 5   Bladder Score 10   Bowels Score 10   Toilet Use Score 5   Transfers (Bed/Chair) Score 10   Mobility (Level Surface) Score 5   Stairs Score 5   Barthel Index Score 72   Licensure   [place] License Number  Imer Whyte Mellisa Barry 88 OTR/L [id number]

## 2019-01-01 NOTE — DISCHARGE SUMMARY
Discharge Summary - Orthopedics   Alex Ritchie 63 y o  female MRN: [id number]  Unit/Bed#: [address] [place] [address]    Attending Physician: Reyes Canard, DO    Admitting diagnosis: Stress fracture of neck of femur with delayed healing, subsequent encounter [M84 359G]    Discharge diagnosis: Stress fracture of neck of femur with delayed healing, subsequent encounter [M84 359G]    Condition at discharge: Stable    Date of admission: 12/31/2018    Date of discharge: 01/01/19    Procedure:  Internal fixation or a left femoral neck stress fracture with a dynamic hip screw, 12/13/18; 250cc EBL, spinal anesthesia, no complications    HPI: 63 y o  female with a history of atraumatic left hip and groin pain who has been seen by Dr Hesham Peter in clinic as an outpatient  She has a history of a right femoral neck stress fracture that was treated with a DHS fixation in 20176  In her workup, MRI revealed a developing left femoral neck stress fracture  She attempted conservative treatment in the form of non weight bearing on the left lower extremity for 6 weeks, but unfortunately repeat MRI demonstrated worsening edema and a visible fracture line of the stress fracture in the left femoral neck  After discussing all options, she agreed that surgical fixation of the left femoral neck stress fracture with a dynamic hip screw, similar to her other side 2 years prior, was the best treatment option  Prior to surgery the risks and benefits of surgery were explained and informed consent was obtained  Hospital course: Pt was taken to the OR on 12/31/18  Surgery went without complications as detailed in the operative report  She was transferred to the PACU in a stable condition and was subsequently transferred to the floor  She did have some urinary incontinence on the floor, likely as a result of spinal anesthesia   However, the anesthesia resolved in the evening on the day of surgery and she was subsequently able to void properly without complication or any decreased sensation in the lower extremities  She was able to work with physical therapy and maintain the non weight bearing recommendations on her left lower extremity, including navigating stairs  On discharge date pt was cleared by PT and the medicine team and determined to be safe for discharge  Although there was a slight decrease in hemoglobin and hematocrit on post-op day #1, she remained asymptomatic and this is likely a result of dilution from her IV fluids  Her pain was controlled with oral oxycodone  Daily discussion was had with the patient, nursing staff, orthopaedic team, and family members if present  All questions were answered to the patients satisifaction  0  Lab Value Date/Time   HGB 10 8 (L) 01/01/2019 0512   HGB 13 0 12/28/2018 1246   HGB 13 1 04/03/2018 0933   HGB 13 0 01/26/2018 0819   HGB 11 3 (L) 11/29/2017 1441   HGB 9 6 (L) 11/11/2017 1004   HGB 8 5 (L) 10/20/2017 0723   HGB 9 6 (L) 10/07/2016 1017   HGB 10 9 (L) 06/10/2015 0733   HGB 10 6 (L) 06/04/2014 0952   HGB 10 6 (L) 05/27/2014 1134       Discharge Instructions:   · Non weight bearing on left lower extremity for 2 weeks, then 50% weight bearing for 2 weeks, and advance to weight bearing as tolerated as directed by physical therapy  · Keep dressings clean and dry at all times, Mepilex may remain in place for 7 days  Do not get incision wet for 2 weeks  · DVT prophylaxis with lovenox for 4 weeks post-operatively  · Oxycodone as needed for pain as prescribed  · Physical therapy as an outpatient will be set up with the help of our office  · Follow-up as scheduled, and call Dr Jazmin Blakely office with any questions or concerns    Discharge Medications: For the complete list of discharge medications, please refer to the patient's medication reconciliation  Disposition: Home     Planned Readmission: No     Discharge Statement   I spent 30 minutes discharging the patient   This time was spent on the day of discharge  I had direct contact with the patient on the day of discharge  Additional documentation is required if more than 30 minutes were spent on discharge

## 2019-01-01 NOTE — PLAN OF CARE
Problem: PHYSICAL THERAPY ADULT  Goal: Performs mobility at highest level of function for planned discharge setting  See evaluation for individualized goals  Outcome: Progressing  Prognosis: Good  Problem List: Pain, Orthopedic restrictions, Decreased strength  Assessment: Pt demonstrates steady gait with axillary crutches NWB L LE  Verbally reviewed L LE supine exercise program, pt declined need for HEP handout  Reviewed the benefits of ice and encouraged pt to take ice packs home with her  Recommendation: Outpatient PT (when OK's by surgeon)     PT - OK to Discharge: Yes    See flowsheet documentation for full assessment

## 2019-01-01 NOTE — UTILIZATION REVIEW
Initial Clinical Review    Admission: Date/Time/Statement: 12/31/18 0947    Orders Placed This Encounter   Procedures    Place in Observation     Standing Status:   Standing     Number of Occurrences:   1     Order Specific Question:   Admitting Physician     Answer:   Sheila Li     Order Specific Question:   Level of Care     Answer:   Med Surg [16]     Order Specific Question:   Bed request comments     Answer:   [facility] [place]         History of Illness    63year old female  Presents for surgical fixation of left femoral neck stress fracture as risk of complete fracture is increasing  Patient has been compliant with non weight bearing and crutches       Patient with left femoral neck fracture was non weight bearing for 6 weeks  And appeared to be doing well however MRI  Obtained this week  Shows progression  Of incomplete fracture line and increased edema around the inferior aspect of the femoral neck  Suggesting that the stress fracture  Is not improving or healing  And is in fact getting worse   Orthopedic surgeon recommends  Surgical fixation as the risk of complete fracture is increasing          12/31/18 0612 12/31/18 0612 12/31/18 0612 12/31/18 0612 12/31/18 0612   98 4 °F (36 9 °C) 75 18 163/93 99 %      Pain Score       3       12/31/18 71 2 kg (157 lb)     Abnormal Labs/Diagnostic Test Results:    MRI left hip  Compared to 11/9/2018, there is interval worsening of the medial left femoral neck stress fracture, both in terms of the conspicuity of the stress fracture line and the surrounding marrow edema          Past Medical/Surgical History:     Diagnosis    Femoral neck stress fracture    Primary osteoarthritis of left knee    Primary osteoarthritis of right knee    Anemia    Anxiety    Dupuytren's contracture    GERD (gastroesophageal reflux disease)    Lumbar scoliosis    Osteoporosis    History of thyroid cancer    Pernicious anemia    Postgastrectomy malabsorption    Postoperative hypothyroidism    Seasonal allergies    Vitamin D deficiency    Other insomnia    Chronic pain of left knee    Pain in left hip    Well adult exam           Admitting Diagnosis:       Stress fracture of neck of femur with delayed healing, subsequent encounter [M84 359G]    Age/Sex: 63 y o  female    Assessment/Plan     Anesthesia Start Date/Time: 12/31/18 0730   Procedure: OPEN REDUCTION W/ INTERNAL FIXATION (ORIF) HIP WITH PLATE AND SCREWS - Left hip dynamic hip screw (Left Hip)   Anesthesia type: spinal   Diagnosis: Stress fracture of neck of femur with delayed healing, subsequent encounter [M84 359G]     Procedure:  Internal fixation left femoral neck    Estimated Blood Loss:   250 mL    Anesthesia Type:   Spinal with sedation    Implants:  Synthes 135 degree DHS 2 hole plate with standard barrel, 12 7 mm x 90 mm lag screw, 4 5 mm cortical screw x2    Operative Findings: On fluoroscopic imaging prior to incision there was no definitive fracture line and the proximal femoral anatomy appeared normal fluoroscopy  A 2 hole standard DHS plate was successfully implanted without complication  Lag screw position was in the center center position of the femoral head with a tip to apex distance of approximately 25 mm    There were no complications          Admission Orders:      PT and OT eval and treat   Sequential compression device     Scheduled Meds:     acetaminophen 975 mg Oral Q8H [address]   aluminum-magnesium hydroxide-simethicone 30 mL Oral Q6H PRN   docusate sodium 100 mg Oral BID   enoxaparin 40 mg Subcutaneous Daily   HYDROmorphone 0 5 mg Intravenous Q3H PRN   HYDROmorphone 1 mg Intravenous Q3H PRN   lactated ringers 125 mL/hr Intravenous Continuous   levothyroxine 112 mcg Oral Early Morning   loratadine 10 mg Oral Daily   ondansetron 4 mg Intravenous Q6H PRN   oxyCODONE 10 mg Oral Q4H PRN   oxyCODONE 5 mg Oral Q4H PRN   sodium chloride 75 mL/hr Intravenous Continuous

## 2019-01-01 NOTE — PHYSICAL THERAPY NOTE
PT TREATMENT     01/01/19 0901   Pain Assessment   Pain Assessment 0-10   Pain Score 7   Pain Type Surgical pain   Pain Location (thigh)   Pain Orientation Left   Restrictions/Precautions   LLE Weight Bearing Per Order NWB   General   Chart Reviewed Yes   Cognition   Overall Cognitive Status WFL   Subjective   Subjective Ready to get OOB, ready to go home  Bed Mobility   Supine to Sit 7  Independent   Sit to Supine 7  Independent   Transfers   Sit to Stand 7  Independent   Stand to Sit 7  Independent   Stand pivot 7  Independent   Toilet transfer 7  Independent   Additional items Commode  (over toilet)   Ambulation/Elevation   Gait pattern (NWB L LE)   Gait Assistance 5  Supervision   Assistive Device Axillary crutches   Distance 2x100 feet   Stair Management Assistance 5  Supervision   Additional items (NWB L LE)   Stair Management Technique With crutches   Number of Stairs 2   Balance   Static Sitting Good   Dynamic Sitting Good   Static Standing Good   Dynamic Standing Fair +   Activity Tolerance   Activity Tolerance Patient limited by fatigue   Assessment   Prognosis Good   Problem List Pain;Orthopedic restrictions;Decreased strength   Assessment Pt demonstrates steady gait with axillary crutches NWB L LE  Verbally reviewed L LE supine exercise program, pt declined need for HEP handout  Reviewed the benefits of ice and encouraged pt to take ice packs home with her  Plan   Treatment/Interventions ADL retraining;Functional transfer training;LE strengthening/ROM; Elevations; Therapeutic exercise;Gait training;Equipment eval/education;Patient/family training   Progress Improving as expected   PT Frequency Twice a day   Recommendation   Recommendation Outpatient PT  (when OK's by surgeon)   Equipment Recommended (pt considering a raised toilet/BSC)   PT - OK to Discharge Yes   Licensure   [place] License Number  Iva Ring PT  [id number]

## 2019-01-01 NOTE — PROGRESS NOTES
Progress Note - Orthopedics   Jonatan Parish 63 y o  female MRN: [id number]  Unit/Bed#: [address] [place] [address] Encounter: [id number]    Assessment:  1) POD#1 s/p left hip DHS    Plan:  Ancef 2 g IV x 2 for 24 hours postop - completed  DVT prophylaxis:  Lovenox 40 mg subcu q day for 4 weeks/SCD's/Ambulation  PT/OT- NWB LLE with crutches  Analgesia PRN - controlled with oral medications  Follow up AM labs - H/H stable, asymptomatic; drop likely dilutional  Spinal anesthesia has resolved - Normal SILT and voiding appropriately  Dressing- monitor for drainage, dressing may stay in place x7 days postop  Discharge planning - discharge home today  Patient has friends to help her at home  Will set up outpatient PT to begin this week  Weight bearing: NWB LLE for 2 weeks    VTE Pharmacologic Prophylaxis: Enoxaparin (Lovenox)  VTE Mechanical Prophylaxis: sequential compression device    Subjective:  Patient seen and examined in bed this morning  No overnight events  Voiding appropriately and sensation has completely returned  No dizzyness/lightheadedness, CP/SOB, n/v, f/c, or parasthesias  Vitals: Blood pressure 152/75, pulse 88, temperature 98 9 °F (37 2 °C), temperature source Oral, resp  rate 20, height 5' 7" (1 702 m), weight 71 2 kg (157 lb), SpO2 99 %  ,Body mass index is 24 59 kg/m²      No intake or output data in the 24 hours ending 01/01/19 1032    Invasive Devices     Peripheral Intravenous Line            Peripheral IV 12/31/18 Left Antecubital 1 day    Peripheral IV 12/31/18 Right Hand 1 day                Physical Exam: NAD, A&Ox3, resting comfortably in bed  Ortho Exam: LLE: left anterior hip dsg c/d/i, thigh soft, +DF/PF/EHL, +L3-S1 SILT normal, DP2+, foot warm, thigh mildly tender, SCDs in place, no edema/ecchymosis/erythema    Lab Results   Component Value Date    GLUCOSE 62 (L) 06/10/2015    CALCIUM 8 4 01/01/2019     06/10/2015    K 4 4 01/01/2019    CO2 26 01/01/2019     01/01/2019    BUN 15 01/01/2019    CREATININE 0 87 01/01/2019     Lab Results   Component Value Date    WBC 8 59 01/01/2019    HGB 10 8 (L) 01/01/2019    HCT 32 7 (L) 01/01/2019    MCV 93 01/01/2019     01/01/2019     Claude Hernandez PA-C Orthopedics

## 2019-01-01 NOTE — PLAN OF CARE
DISCHARGE PLANNING     Discharge to home or other facility with appropriate resources Progressing        DISCHARGE PLANNING - CARE MANAGEMENT     Discharge to post-acute care or home with appropriate resources Progressing        Knowledge Deficit     Patient/family/caregiver demonstrates understanding of disease process, treatment plan, medications, and discharge instructions Progressing        PAIN - ADULT     Verbalizes/displays adequate comfort level or baseline comfort level Progressing        Potential for Falls     Patient will remain free of falls Progressing        SAFETY ADULT     Maintain or return to baseline ADL function Progressing     Maintain or return mobility status to optimal level Progressing

## 2019-01-02 DIAGNOSIS — M80.80XA LOCALIZED OSTEOPOROSIS WITH CURRENT PATHOLOGICAL FRACTURE, INITIAL ENCOUNTER: Primary | ICD-10-CM

## 2019-01-02 NOTE — PROGRESS NOTES
[facility] visit on hold  She had surgery for stress fracture  Should we try to get another 3 months of forteo for now?

## 2019-01-02 NOTE — PROGRESS NOTES
Forteo is approved through 4/4/2019  Will RX forteo for 3 more months  Has Follow up in April  Will print RX, please Send RX to fax number for [facility] pharmacy with copy for authorization scanned in chart for 12/20/2018  Also can you let her know we are going to do another 3 months of forteo? We discussed in past betsy is off label to rx over 24 months but best option for her osteoporosis until she can get in with [facility] or start prolia  Thanks

## 2019-01-02 NOTE — UTILIZATION REVIEW
Notification of Inpatient Admission/Inpatient Authorization Request  This is a Notification of Inpatient Admission/Request for Inpatient Authorization for our facility [facility]  Be advised that this patient was admitted to our facility under Inpatient Status  Please contact the Utilization Review Department where the patient is receiving care services for additional admission information  Place of Service Code: 24   Place of Service Name: Inpatient Hospital  Presentation Date & Time: 12/31/2018  5:46 AM  Inpatient Admission Date & Time: N/A N/A  Discharge Date & Time: 1/1/2019  1:27 PM   Discharge Disposition (if discharged): Home/Self Care  Attending Physician:  Dr Vivien Christensen  NPI  [id number]  [address]  [phone]  Admission Orders     Ordered        12/31/18 0947  Place in Observation  Once               Facility: [facility]  Address: [address] [place] [address], [place]   Phone: [phone]  Tax ID: [id number]  NPI: [id number]  Medicare ID: [id number]    [address]  Utilization Review Department  Phone: [phone]; Fax [phone]  ATTENTION: Please call with any questions or concerns to [phone]  and carefully listen to the prompts so that you are directed to the right person  Send all requests for admission clinical reviews, approved or denied determinations and any other requests to fax [phone]   All voicemails are confidential

## 2019-01-03 ENCOUNTER — EVALUATION (OUTPATIENT)
Dept: PHYSICAL THERAPY | Facility: CLINIC | Age: 59
End: 2019-01-03
Payer: COMMERCIAL

## 2019-01-03 DIAGNOSIS — M84.353G STRESS FRACTURE OF NECK OF FEMUR WITH DELAYED HEALING, SUBSEQUENT ENCOUNTER: Primary | ICD-10-CM

## 2019-01-03 DIAGNOSIS — M25.552 PAIN IN LEFT HIP: ICD-10-CM

## 2019-01-03 DIAGNOSIS — M80.80XA LOCALIZED OSTEOPOROSIS WITH CURRENT PATHOLOGICAL FRACTURE, INITIAL ENCOUNTER: ICD-10-CM

## 2019-01-03 PROCEDURE — G8978 MOBILITY CURRENT STATUS: HCPCS

## 2019-01-03 PROCEDURE — 97162 PT EVAL MOD COMPLEX 30 MIN: CPT

## 2019-01-03 PROCEDURE — G8979 MOBILITY GOAL STATUS: HCPCS

## 2019-01-03 NOTE — PROGRESS NOTES
PT Evaluation     Today's date: 1/3/2019  Patient name: Anton Walker  : 1960  MRN: [id number]  Referring provider: Phill Chou  Dx:   Encounter Diagnosis     ICD-10-CM    1  Stress fracture of neck of femur with delayed healing, subsequent encounter M84 359G Ambulatory referral to Physical Therapy   2  Pain in left hip M25 552 Ambulatory referral to Physical Therapy       Start Time: 65  Stop Time:   Total time in clinic (min): 45 minutes    Assessment  Assessment details: Patient is a 64 yo female s/p Left hip DHS for femoral neck stress fracture on 18  Patient is currently NWB on the L LE for 2 weeks post surgery  Patient is able to negotiate even surfaces and stairs with the crutches and abide by weight bearing restrictions  Patient presents with decreased L hip and knee AROM and PROM, decreased L hip strength and activity intolerance  Patient has difficulty with transfers, amb, bed mobility and stairs  PT will address the noted impairments by performing core and LE strengthening, stretching, ROM exercises, functional activities, gait training, balance and manual techniques such as PROM and scar massage to allow the patient to return to her PLOF  PT recommended 2x/week for 6-8 weeks c a good prognosis 2* PLOF  Impairments: abnormal gait, abnormal or restricted ROM, activity intolerance, impaired balance, impaired physical strength, lacks appropriate home exercise program, pain with function, safety issue and weight-bearing intolerance    Symptom irritability: moderateUnderstanding of Dx/Px/POC: good   Prognosis: good    Goals  STG: In four weeks the patient will:    1  Be (I) with her HEP  2  Increase L LE strength to 4+/5 MMT score to assist c ADLs and amb  3  Increase L hip flexion ROM to 120 degrees to assist c transfers and stairs  4  Increased L knee flexion ROM to 135 degrees to assist c amb and stairs  5  amb 200ft with crutches (I) in a 50% WB on the L LE  LTG: In eight weeks, the patient will:    1  Increase FOTO score to 56 to demonstrate improvements in symptoms and function  2  Demonstrate full L hip and knee AROM without pain  3  Perform 30 mins of activity without pain  4  Increase L LE strength to 5/5 MMT score to assist c functional activities  5  amb 200ft (I) without weight bearing restrictions and without pain in the L LE  Plan  Patient would benefit from: skilled physical therapy  Referral necessary: No  Planned modality interventions: cryotherapy and thermotherapy: hydrocollator packs  Planned therapy interventions: abdominal trunk stabilization, balance, balance/weight bearing training, body mechanics training, functional ROM exercises, home exercise program, therapeutic exercise, therapeutic activities, stretching, strengthening, postural training, patient education, neuromuscular re-education, massage, manual therapy and joint mobilization  Frequency: 2x week  Duration in visits: 16  Duration in weeks: 8  Plan of Care beginning date: 1/3/2019  Plan of Care expiration date: 2019  Treatment plan discussed with: patient        Subjective Evaluation    History of Present Illness  Date of onset: 2018  Date of surgery: 2018  Mechanism of injury: surgery  Mechanism of injury: Patient noted on 18 she was running, slipped in a puddle and thought she had a groin pull at a softball game  She played two more games and started limping  On 18 she was placed on crutches and NWB orders 2* unable to get out of bed  She had sx on 18 2* her fx increasing when NWB through a MRI  After surgery, patient noted no L hip pain, however noted L quad pain and swelling  Patient noted she ices throughout the day  Patient uses a WC to get around the house and crutches to amb in the community  Patient is currently driving     Quality of life: good    Pain  Current pain ratin  At best pain ratin  At worst pain ratin (when moving )  Location: L quad  Quality: burning  Relieving factors: ice  Aggravating factors: standing, walking, stair climbing, sitting, lifting and running  Progression: improved    Social Support  Steps to enter house: yes (3 VLAD)  [place]  Lives with: alone (2 dogs)    Employment status: working ([employer])  Hand dominance: right  Exercise history: running 2-3 miles a day prior to fx and surgery    Treatments  Discharged from (in last 30 days): inpatient hospitalization  Discharged from (in last 30 days) comments: D/C 2*  s/p Left hip DHS for femoral neck stress fracture on 12/31/18  Patient Goals  Patient goals for therapy: decreased pain, improved balance, increased motion, increased strength, independence with ADLs/IADLs, return to sport/leisure activities, return to work and decreased edema  Patient goal: "get back to baseline "        Objective     Palpation   Left   Tenderness of the rectus femoris       Neurological Testing     Sensation     Hip   Left Hip   Intact: light touch    Right Hip   Intact: light touch    Active Range of Motion   Left Hip   Flexion: 80 degrees   Abduction: 10 degrees   External rotation (90/90): 15 degrees     Right Hip   Normal active range of motion  External rotation (90/90): with pain    Additional Active Range of Motion Details  L knee flexion ROM: 105 degrees AROM, 109 degrees PROM (Pain)        Passive Range of Motion   Left Hip   Flexion: 90 degrees   Abduction: 15 degrees   External rotation (90/90): 15 degrees     Right Hip   Normal passive range of motion    Strength/Myotome Testing     Left Hip   Planes of Motion   Flexion: 4-  Extension: 4-  Abduction: 4-  Adduction: 4-  External rotation: 4-    Right Hip   Planes of Motion   Flexion: 5  Extension: 5  Abduction: 4+  Adduction: 4+  External rotation: 4+    Additional Strength Details  Knee flexion: R: 5/5, L 4/5 (Pain)  Knee extension: R: 5/5, L: 4-/5    DF: 5/5  PF: 5/5    General Comments     Hip Comments   Swellin 5cm on L thigh, 18 cm above patella      Flowsheet Rows      Most Recent Value   PT/OT G-Codes   Current Score  56   Projected Score  37   FOTO information reviewed  Yes   Assessment Type  Evaluation   G code set  Mobility: Walking & Moving Around   Mobility: Walking and Moving Around Current Status ()  CL   Mobility: Walking and Moving Around Goal Status ()  CK          Precautions: s/p Left hip DHS for femoral neck stress fracture 18, osteoporosis, hypothyroidism, hx of thyroid CA,     NWB 2 weeks post op, then 50% for 2 weeks, and progress to weight bearing as tolerated     Daily Treatment Diary     Manual  1/3            L hip PROM                                                                     Exercise Diary  /3                         TrA nv            TrA + hip add nv            TrA + hip abd nv            Glute set nv            Supine hip add nv            Supine hip abd nv            Heel slides nv                                                                                                                                                                            Modalities  1/3            CP PRN np

## 2019-01-03 NOTE — PROGRESS NOTES
After speaking to LAS, she states that she has already spoken to the pt and informed her of this  I will print the rx and fax along with the approval fax to the fax # on the form

## 2019-01-08 ENCOUNTER — APPOINTMENT (OUTPATIENT)
Dept: PHYSICAL THERAPY | Facility: CLINIC | Age: 59
End: 2019-01-08
Payer: COMMERCIAL

## 2019-01-08 ENCOUNTER — OFFICE VISIT (OUTPATIENT)
Dept: PHYSICAL THERAPY | Facility: CLINIC | Age: 59
End: 2019-01-08
Payer: COMMERCIAL

## 2019-01-08 DIAGNOSIS — M25.552 PAIN IN LEFT HIP: ICD-10-CM

## 2019-01-08 DIAGNOSIS — M81.0 AGE-RELATED OSTEOPOROSIS WITHOUT CURRENT PATHOLOGICAL FRACTURE: Primary | ICD-10-CM

## 2019-01-08 DIAGNOSIS — M84.353G STRESS FRACTURE OF NECK OF FEMUR WITH DELAYED HEALING, SUBSEQUENT ENCOUNTER: Primary | ICD-10-CM

## 2019-01-08 PROCEDURE — 97112 NEUROMUSCULAR REEDUCATION: CPT

## 2019-01-08 PROCEDURE — 97110 THERAPEUTIC EXERCISES: CPT

## 2019-01-08 PROCEDURE — 97140 MANUAL THERAPY 1/> REGIONS: CPT

## 2019-01-08 NOTE — TELEPHONE ENCOUNTER
We are holding off on prolia for this patient for now due to non healing stress fracture   She is going to continue with another 3 months of forteo

## 2019-01-08 NOTE — PROGRESS NOTES
Daily Note/ Discharge     Today's date: 2019  Patient name: Nirali Payan  : 1960  MRN: [id number]  Referring provider: Lexi Lr  Dx:   Encounter Diagnosis     ICD-10-CM    1  Stress fracture of neck of femur with delayed healing, subsequent encounter M84 359G    2  Pain in left hip M25 552        Start Time: 1615  Stop Time: 1700  Total time in clinic (min): 45 minutes    Subjective: Patient noted that the burning pain in the L quad has gone down to 5-6/10 rather than an 8/10  Objective: See treatment diary below      Precautions: s/p Left hip DHS for femoral neck stress fracture 18, osteoporosis, hypothyroidism, hx of thyroid CA,     NWB 2 weeks post op, then 50% for 2 weeks, and progress to weight bearing as tolerated     Daily Treatment Diary     Manual  1/3 1/8           L hip PROM  MW                                                                   Exercise Diary  1/3 1/8                        TrA nv X30, 5" hold           TrA + hip add nv X30, 5" hold           TrA + hip abd nv X30, 3" hold,  RTB           Glute set nv x30           LAQ nv x30           Heel slides nv x30           clams  2x10 L                                                                                                                                                              Modalities  1/3            CP PRN np                                            Assessment: Patient performed mat program without notes of pain in the L hip  PT noted improvements with L knee flexion ROM and has decreased in post-op swelling which decreased her burning thigh pain  Patient is self D/C from [facility] to [facility] 2* insurance reasons  Patient continues to require PT for additional gains and goal achievement  Plan: Continue per plan of care

## 2019-01-09 ENCOUNTER — TELEPHONE (OUTPATIENT)
Dept: OBGYN CLINIC | Facility: CLINIC | Age: 59
End: 2019-01-09

## 2019-01-09 NOTE — TELEPHONE ENCOUNTER
Lmom for patient, returning her call  I did advise I will not be in the office tomorrow, that if she calls back she can certainly ask for Alena Schmidt

## 2019-01-10 ENCOUNTER — APPOINTMENT (OUTPATIENT)
Dept: PHYSICAL THERAPY | Facility: CLINIC | Age: 59
End: 2019-01-10
Payer: COMMERCIAL

## 2019-01-14 ENCOUNTER — APPOINTMENT (OUTPATIENT)
Dept: PHYSICAL THERAPY | Facility: CLINIC | Age: 59
End: 2019-01-14
Payer: COMMERCIAL

## 2019-01-15 ENCOUNTER — OFFICE VISIT (OUTPATIENT)
Dept: OBGYN CLINIC | Facility: CLINIC | Age: 59
End: 2019-01-15

## 2019-01-15 ENCOUNTER — APPOINTMENT (OUTPATIENT)
Dept: RADIOLOGY | Facility: CLINIC | Age: 59
End: 2019-01-15
Payer: COMMERCIAL

## 2019-01-15 VITALS
WEIGHT: 153 LBS | SYSTOLIC BLOOD PRESSURE: 144 MMHG | BODY MASS INDEX: 24.01 KG/M2 | DIASTOLIC BLOOD PRESSURE: 82 MMHG | HEIGHT: 67 IN | HEART RATE: 70 BPM

## 2019-01-15 DIAGNOSIS — Z87.81 S/P ORIF (OPEN REDUCTION INTERNAL FIXATION) FRACTURE: ICD-10-CM

## 2019-01-15 DIAGNOSIS — Z98.890 S/P ORIF (OPEN REDUCTION INTERNAL FIXATION) FRACTURE: ICD-10-CM

## 2019-01-15 DIAGNOSIS — M84.353D STRESS FRACTURE OF NECK OF FEMUR WITH ROUTINE HEALING, SUBSEQUENT ENCOUNTER: Primary | ICD-10-CM

## 2019-01-15 DIAGNOSIS — E03.9 HYPOTHYROIDISM, UNSPECIFIED TYPE: Primary | ICD-10-CM

## 2019-01-15 PROCEDURE — 73502 X-RAY EXAM HIP UNI 2-3 VIEWS: CPT

## 2019-01-15 PROCEDURE — 99024 POSTOP FOLLOW-UP VISIT: CPT | Performed by: ORTHOPAEDIC SURGERY

## 2019-01-15 RX ORDER — LEVOTHYROXINE SODIUM 112 MCG
TABLET ORAL
Qty: 108 TABLET | Refills: 1 | Status: SHIPPED | OUTPATIENT
Start: 2019-01-15 | End: 2019-09-19 | Stop reason: SDUPTHER

## 2019-01-15 NOTE — PROGRESS NOTES
Assessment/Plan:  1  Stress fracture of neck of femur with routine healing, subsequent encounter     2  S/P ORIF (open reduction internal fixation) fracture  XR hip/pelv 2-3 vws left if performed     Patient is here for 2 week follow-up status post fixation in situ of her left femoral neck stress fracture  Overall she is doing well and has been compliant with nonweightbearing on left lower extremity  Her x-rays demonstrate no sign of hardware failure or propagation of an occult fracture  At this point she may start her slow progressive return to weight-bearing starting with partial weight-bearing, and progressing to 50% weight-bearing then weight-bearing as tolerated  Anticipate she will make a quick progressive return to weight-bearing and I advised her to allow pain to be her guide  She needs to continue her Lovenox injections for 4 more weeks to prevent blood clot  I expected to be weight-bearing as tolerated in approximately 1 month from now  She will return to the office in 4 weeks time with repeat x-ray of her left hip  In 4 weeks I anticipate he will be able to return to work and weight-bearing as tolerated fashion  All of her questions were addressed today    Subjective:  2 weeks status post fixation in situ of her left-sided femoral neck stress fracture    Patient ID: Ashely Mahoney is a 63 y o  female  HPI  Patient is here for follow-up regarding the above-stated complaint  Overall she is doing well and reports that she is doing well with her weight-bearing restrictions in her postoperative recovery  She does have some incisional pain over the lateral aspect of her hip  She states that there is some thigh swelling but it has been remarkably improved  She is continuing to use her Lovenox injections  She is eager to start weight-bearing again returning to her active lifestyle  Review of Systems   Constitutional: Positive for activity change  HENT: Negative  Eyes: Negative  Respiratory: Negative  Cardiovascular: Negative  Gastrointestinal: Negative  Endocrine: Negative  Musculoskeletal: Positive for arthralgias  Skin: Negative  Neurological: Negative  Psychiatric/Behavioral: Negative  Past Medical History:   Diagnosis Date    Anxiety     Cancer [facility])     thyroid    Closed rib fracture     x2    Disease of thyroid gland     cancer,had thyroidectomy    Fall     last assessed 16    Iron deficiency anemia     Viral gastroenteritis     last assessed 12       Past Surgical History:   Procedure Laterality Date    APPENDECTOMY  2006    BREAST SURGERY Left 2001    CATARACT EXTRACTION Left     lens implant     SECTION      CHOLECYSTECTOMY      ESOPHAGOGASTRODUODENOSCOPY      diagnostic    GASTRIC BYPASS      HIP SURGERY Right     HYSTERECTOMY      total abdominal    JOINT REPLACEMENT      KNEE ARTHROSCOPY Bilateral     AK COLONOSCOPY FLX DX W/COLLJ SPEC WHEN PFRMD N/A 2017    Procedure: EGD AND COLONOSCOPY;  Surgeon: Jeanne Weldon MD;  [facility];   Service: Gastroenterology    AK FEMORAL FX, OPEN TX Right 10/10/2016    Procedure: FEMORAL NECK FIXATION ;  Surgeon: Derek Hanna MD;  Location:  Main OR;  Service: Orthopedics    AK TREAT INTER/SUBTROCH FX,W/PLATE/SCREW Left     Procedure: OPEN REDUCTION W/ INTERNAL FIXATION (ORIF) HIP WITH PLATE AND SCREWS - Left hip dynamic hip screw;  Surgeon: Hugh Gudino DO;  Location: [facility];  Service: Orthopedics    THYROIDECTOMY Bilateral     with LN dissectomy       Family History   Problem Relation Age of Onset    Heart disease Mother         cardiac disorder    Diabetes Mother     Cancer Father         of mouth    Bone cancer Maternal Grandfather     Brain cancer Maternal Grandfather     Lung cancer Maternal Grandfather     Skin cancer Maternal Grandfather     Coronary artery disease Family     Osteoporosis Family     Rheum arthritis Family     Breast cancer Cousin     Melanoma Cousin     Lung cancer Paternal Aunt     Throat cancer Brother     No Known Problems Sister     No Known Problems Maternal Aunt     No Known Problems Maternal Uncle     No Known Problems Paternal Uncle     No Known Problems Maternal Grandmother     No Known Problems Paternal Grandmother     No Known Problems Paternal Grandfather     ADD / ADHD Neg Hx     Anesthesia problems Neg Hx     Clotting disorder Neg Hx     Collagen disease Neg Hx     Dislocations Neg Hx     Learning disabilities Neg Hx     Neurological problems Neg Hx     Rheumatologic disease Neg Hx     Scoliosis Neg Hx     Vascular Disease Neg Hx        Social History     Occupational History    Not on file       Social History Main Topics    Smoking status: Never Smoker    Smokeless tobacco: Never Used    Alcohol use Yes      Comment: moderate,social    Drug use: No    Sexual activity: Not on file         Current Outpatient Prescriptions:     acetaminophen (TYLENOL) 325 mg tablet, Take 650 mg by mouth every 6 (six) hours as needed for mild pain, Disp: , Rfl:     ALPRAZolam (XANAX) 0 5 mg tablet, Take 1 tablet (0 5 mg total) by mouth 2 (two) times a day as needed for anxiety, Disp: 60 tablet, Rfl: 3    Calcium Carbonate (CALCIUM 600) 1500 (600 CA) MG TABS, Take by mouth every morning  , Disp: , Rfl:     Cholecalciferol (VITAMIN D3) 5000 units CAPS, Take by mouth every morning  , Disp: , Rfl:     cyanocobalamin (VITAMIN B-12) 100 mcg tablet, Take by mouth daily  , Disp: , Rfl:     enoxaparin (LOVENOX) 40 mg/0 4 mL, Inject 0 4 mL (40 mg total) under the skin daily, Disp: 28 Syringe, Rfl: 0    loratadine (CLARITIN) 10 mg tablet, Take 10 mg by mouth daily, Disp: , Rfl:     SYNTHROID 112 MCG tablet, 112 mcg daily and  1 1/2 every other day, Disp: 108 tablet, Rfl: 1    teriparatide (FORTEO) 600 MCG/2 4ML injection, Inject 0 08 mL (20 mcg total) under the skin daily for 90 days, Disp: 7 2 mL, Rfl: 0    zolpidem (AMBIEN CR) 12 5 MG CR tablet, Take 1 tablet (12 5 mg total) by mouth daily at bedtime as needed for sleep, Disp: 30 tablet, Rfl: 3    FORTEO 600 MCG/2 4ML injection, Inject 0 08 mL (20 mcg total) under the skin daily for 90 days, Disp: 7 2 mL, Rfl: 5    Allergies   Allergen Reactions    Other     Scopolamine      Reaction Date: 10Aug2011;     Sulfa Antibiotics Hives    Sulfamethoxazole-Trimethoprim      Reaction Date: 10Aug2011;        Objective:  Vitals:    01/15/19 1245   BP: 144/82   Pulse: 70       Body mass index is 23 96 kg/m²  Left Hip Exam     Tenderness   The patient is experiencing tenderness in the lateral     Muscle Strength   Abduction: 5/5   Adduction: 5/5   Flexion: 5/5     Other   Erythema: absent  Scars: present  Sensation: normal  Pulse: present    Comments:  Lateral hip incision healing well  There is minimal swelling in the thigh but appropriate for this stage in postoperative course  Gentle passive range of motion of her hip is without pain or discomfort to the groin  Neurovascular intact left lower extremity  Physical Exam   Constitutional: She is oriented to person, place, and time  She appears well-developed  HENT:   Head: Atraumatic  Eyes: EOM are normal    Neck: Neck supple  Cardiovascular: Normal rate  Pulmonary/Chest: Effort normal    Musculoskeletal:   See orthopedic exam   Neurological: She is alert and oriented to person, place, and time  Skin: Skin is warm and dry  Psychiatric: She has a normal mood and affect  Nursing note and vitals reviewed  I have personally reviewed pertinent films in PACS  X-rays of the left hip demonstrate a well-positioned well-aligned DHS within the left proximal femur  No sign of occult fracture propagation  No sign of hardware failure  Darryle Schlatter D O    Division of Adult Reconstruction  [facility] of [facility]

## 2019-01-22 DIAGNOSIS — M80.80XA LOCALIZED OSTEOPOROSIS WITH CURRENT PATHOLOGICAL FRACTURE, INITIAL ENCOUNTER: Primary | ICD-10-CM

## 2019-01-23 ENCOUNTER — TELEPHONE (OUTPATIENT)
Dept: OBGYN CLINIC | Facility: HOSPITAL | Age: 59
End: 2019-01-23

## 2019-01-23 NOTE — TELEPHONE ENCOUNTER
Called patient back to further assist with visco questions  Seems like we received her Gelsyn3 injections for her left knee however she was only seen once  I need clarification on patients question

## 2019-01-23 NOTE — TELEPHONE ENCOUNTER
Mickie Badillo    [phone]    Dr Joanne Bautista    Patient requesting update for scheduling Gel injections  Please contact patient   Thanks

## 2019-01-23 NOTE — TELEPHONE ENCOUNTER
Called patient back needing clarification on her gel injections - seems like patient was scheduled for gelsyn 3 injections which we received from pharmacy - and she was only seen for 1 appointment

## 2019-01-24 ENCOUNTER — TELEPHONE (OUTPATIENT)
Dept: OBGYN CLINIC | Facility: CLINIC | Age: 59
End: 2019-01-24

## 2019-01-24 NOTE — TELEPHONE ENCOUNTER
Patient called back to schedule for her gel injection  I was able to to schedule her  Patient was pleased

## 2019-01-25 ENCOUNTER — OFFICE VISIT (OUTPATIENT)
Dept: OBGYN CLINIC | Facility: CLINIC | Age: 59
End: 2019-01-25
Payer: COMMERCIAL

## 2019-01-25 VITALS
DIASTOLIC BLOOD PRESSURE: 86 MMHG | BODY MASS INDEX: 24.01 KG/M2 | WEIGHT: 153 LBS | HEART RATE: 70 BPM | HEIGHT: 67 IN | SYSTOLIC BLOOD PRESSURE: 122 MMHG

## 2019-01-25 DIAGNOSIS — M25.562 CHRONIC PAIN OF LEFT KNEE: ICD-10-CM

## 2019-01-25 DIAGNOSIS — G89.29 CHRONIC PAIN OF LEFT KNEE: ICD-10-CM

## 2019-01-25 DIAGNOSIS — M17.12 PRIMARY OSTEOARTHRITIS OF LEFT KNEE: Primary | ICD-10-CM

## 2019-01-25 PROCEDURE — 20610 DRAIN/INJ JOINT/BURSA W/O US: CPT | Performed by: PHYSICIAN ASSISTANT

## 2019-01-25 NOTE — PROGRESS NOTES
Assessment/Plan:  1  Primary osteoarthritis of left knee  Large joint arthrocentesis   2  Chronic pain of left knee  Large joint arthrocentesis     Adeola is a pleasant 61-year-old female with activity related left knee pain due to her underlying osteoarthritis  She consented to and underwent the 1st of 3 Gelsyn-3 injections in her left knee without difficulty or complication  She tolerated this injection well and there were no complications  She will return next week to continue the series  All of her questions were addressed today  Large joint arthrocentesis  Date/Time: 1/25/2019 11:55 AM  Consent given by: patient  Site marked: site marked  Timeout: Immediately prior to procedure a time out was called to verify the correct patient, procedure, equipment, support staff and site/side marked as required   Supporting Documentation  Indications: pain   Procedure Details  Location: knee - L knee  Preparation: Patient was prepped and draped in the usual sterile fashion  Needle size: 20 G  Ultrasound guidance: no  Approach: anterolateral  Medications administered: 2 mL Sodium Hyaluronate (Viscosup) 16 8 MG/2ML    Patient tolerance: patient tolerated the procedure well with no immediate complications  Dressing:  Sterile dressing applied        Subjective: Left knee Gelsyn-3 #1    Patient ID: Yolande  is a 63 y o  female  Adeola is a pleasant 61-year-old female well known to our practice for her activity related left knee pain and underlying osteoarthritis  Her left knee has become more aggravated she is working with physical therapy to recover from her recent left hip surgery  She is here to initiate the series of Gelsyn-3 injections  She denies any new specific injuries to the left knee  Review of Systems   Constitutional: Negative  HENT: Negative  Eyes: Negative  Respiratory: Negative  Cardiovascular: Negative  Gastrointestinal: Negative  Endocrine: Negative  Genitourinary: Negative  Musculoskeletal: Positive for arthralgias  Skin: Negative  Allergic/Immunologic: Negative  Neurological: Negative  Hematological: Negative  Psychiatric/Behavioral: Negative  Past Medical History:   Diagnosis Date    Anxiety     Cancer [facility])     thyroid    Closed rib fracture     x2    Disease of thyroid gland     cancer,had thyroidectomy    Fall     last assessed 16    Iron deficiency anemia     Viral gastroenteritis     last assessed 12       Past Surgical History:   Procedure Laterality Date    APPENDECTOMY  2006    BREAST SURGERY Left 2001    CATARACT EXTRACTION Left     lens implant     SECTION      CHOLECYSTECTOMY      ESOPHAGOGASTRODUODENOSCOPY      diagnostic    GASTRIC BYPASS      HIP SURGERY Right     HYSTERECTOMY      total abdominal    JOINT REPLACEMENT      KNEE ARTHROSCOPY Bilateral     NM COLONOSCOPY FLX DX W/COLLJ SPEC WHEN PFRMD N/A 2017    Procedure: EGD AND COLONOSCOPY;  Surgeon: Kala Atkinson MD;  [facility];   Service: Gastroenterology    NM FEMORAL FX, OPEN TX Right 10/10/2016    Procedure: FEMORAL NECK FIXATION ;  Surgeon: Zackery Beckford MD;  [facility];  Service: Orthopedics    NM TREAT INTER/SUBTROCH FX,W/PLATE/SCREW Left     Procedure: OPEN REDUCTION W/ INTERNAL FIXATION (ORIF) HIP WITH PLATE AND SCREWS - Left hip dynamic hip screw;  Surgeon: Hanna Cuellar, DO;  Location: [address] [place] [address];  Service: Orthopedics    THYROIDECTOMY Bilateral     with LN dissectomy       Family History   Problem Relation Age of Onset    Heart disease Mother         cardiac disorder    Diabetes Mother     Cancer Father         of mouth    Bone cancer Maternal Grandfather     Brain cancer Maternal Grandfather     Lung cancer Maternal Grandfather     Skin cancer Maternal Grandfather     Coronary artery disease Family     Osteoporosis Family     Rheum arthritis Family     Breast cancer Cousin     Melanoma Cousin     Lung cancer Paternal Aunt     Throat cancer Brother     No Known Problems Sister     No Known Problems Maternal Aunt     No Known Problems Maternal Uncle     No Known Problems Paternal Uncle     No Known Problems Maternal Grandmother     No Known Problems Paternal Grandmother     No Known Problems Paternal Grandfather     ADD / ADHD Neg Hx     Anesthesia problems Neg Hx     Clotting disorder Neg Hx     Collagen disease Neg Hx     Dislocations Neg Hx     Learning disabilities Neg Hx     Neurological problems Neg Hx     Rheumatologic disease Neg Hx     Scoliosis Neg Hx     Vascular Disease Neg Hx        Social History     Occupational History    Not on file       Social History Main Topics    Smoking status: Never Smoker    Smokeless tobacco: Never Used    Alcohol use Yes      Comment: moderate,social    Drug use: No    Sexual activity: Not on file         Current Outpatient Prescriptions:     acetaminophen (TYLENOL) 325 mg tablet, Take 650 mg by mouth every 6 (six) hours as needed for mild pain, Disp: , Rfl:     ALPRAZolam (XANAX) 0 5 mg tablet, Take 1 tablet (0 5 mg total) by mouth 2 (two) times a day as needed for anxiety, Disp: 60 tablet, Rfl: 3    Calcium Carbonate (CALCIUM 600) 1500 (600 CA) MG TABS, Take by mouth every morning  , Disp: , Rfl:     Cholecalciferol (VITAMIN D3) 5000 units CAPS, Take by mouth every morning  , Disp: , Rfl:     cyanocobalamin (VITAMIN B-12) 100 mcg tablet, Take by mouth daily  , Disp: , Rfl:     enoxaparin (LOVENOX) 40 mg/0 4 mL, Inject 0 4 mL (40 mg total) under the skin daily, Disp: 28 Syringe, Rfl: 0    Insulin Pen Needle (BD PEN NEEDLE WILLIAM U/F) 32G X 4 MM MISC, by Does not apply route daily, Disp: 90 each, Rfl: 3    loratadine (CLARITIN) 10 mg tablet, Take 10 mg by mouth daily, Disp: , Rfl:     SYNTHROID 112 MCG tablet, 112 mcg daily and  1 1/2 every other day, Disp: 108 tablet, Rfl: 1   teriparatide (FORTEO) 600 MCG/2 4ML injection, Inject 0 08 mL (20 mcg total) under the skin daily for 90 days, Disp: 7 2 mL, Rfl: 0    zolpidem (AMBIEN CR) 12 5 MG CR tablet, Take 1 tablet (12 5 mg total) by mouth daily at bedtime as needed for sleep, Disp: 30 tablet, Rfl: 3    FORTEO 600 MCG/2 4ML injection, Inject 0 08 mL (20 mcg total) under the skin daily for 90 days, Disp: 7 2 mL, Rfl: 5    Allergies   Allergen Reactions    Other     Scopolamine      Reaction Date: 10Aug2011;     Sulfa Antibiotics Hives    Sulfamethoxazole-Trimethoprim      Reaction Date: 10Aug2011;        Objective:  Vitals:    01/25/19 1131   BP: 122/86   Pulse: 70       Body mass index is 23 96 kg/m²  Left Knee Exam     Tenderness   The patient is experiencing tenderness in the patella and lateral joint line  Range of Motion   Extension:  0 normal   Flexion:  130 (patellofemoral crepitus, lateral patellar facet tenderness) normal     Tests   Amanda:  Medial - negative Lateral - negative  Lachman:  Anterior - negative      Drawer:       Anterior - negative       Varus: negative  Valgus: negative  Patellar Apprehension: negative    Other   Erythema: absent  Scars: present (previous arthroscopy scars)  Sensation: normal  Pulse: present  Swelling: none  Effusion: no effusion present    Comments:  Collaterals stable at 0, 30, and 90 degrees   Thigh and calf soft and nontender  Ambulates with one crutch and antalgic gain on the left due to recent hip surgery            Observations   Left Knee   Negative for effusion  Physical Exam   Constitutional: She is oriented to person, place, and time  She appears well-developed and well-nourished  Body mass index is 23 96 kg/m²  HENT:   Head: Normocephalic and atraumatic  Eyes: EOM are normal    Neck: Normal range of motion  Cardiovascular: Intact distal pulses  Pulmonary/Chest: Effort normal    Musculoskeletal:        Left knee: She exhibits no effusion     See ortho exam Neurological: She is alert and oriented to person, place, and time  Skin: Skin is warm and dry  Psychiatric: She has a normal mood and affect   Her behavior is normal  Judgment and thought content normal

## 2019-02-01 ENCOUNTER — OFFICE VISIT (OUTPATIENT)
Dept: OBGYN CLINIC | Facility: CLINIC | Age: 59
End: 2019-02-01
Payer: COMMERCIAL

## 2019-02-01 VITALS
WEIGHT: 153 LBS | DIASTOLIC BLOOD PRESSURE: 74 MMHG | HEIGHT: 67 IN | SYSTOLIC BLOOD PRESSURE: 119 MMHG | HEART RATE: 84 BPM | BODY MASS INDEX: 24.01 KG/M2

## 2019-02-01 DIAGNOSIS — G89.29 CHRONIC PAIN OF LEFT KNEE: ICD-10-CM

## 2019-02-01 DIAGNOSIS — M17.12 PRIMARY OSTEOARTHRITIS OF LEFT KNEE: Primary | ICD-10-CM

## 2019-02-01 DIAGNOSIS — M25.562 CHRONIC PAIN OF LEFT KNEE: ICD-10-CM

## 2019-02-01 PROCEDURE — 20610 DRAIN/INJ JOINT/BURSA W/O US: CPT | Performed by: ORTHOPAEDIC SURGERY

## 2019-02-01 NOTE — PROGRESS NOTES
Assessment/Plan:  1  Primary osteoarthritis of left knee     2  Chronic pain of left knee       Patient is here for her 2nd viscosupplementation injection in her left knee in a series of 3  She has been tolerating the series of injections well  Large joint arthrocentesis  Date/Time: 2/1/2019 11:06 AM  Consent given by: patient  Site marked: site marked  Timeout: Immediately prior to procedure a time out was called to verify the correct patient, procedure, equipment, support staff and site/side marked as required   Supporting Documentation  Indications: pain   Procedure Details  Location: knee - L knee  Preparation: Patient was prepped and draped in the usual sterile fashion  Needle size: 20 G  Ultrasound guidance: no  Approach: anterolateral  Medications administered: 2 mL Sodium Hyaluronate (Viscosup) 16 8 MG/2ML  Specialty Pharmacy Supplied: received medications from pharmacy  Patient tolerance: patient tolerated the procedure well with no immediate complications  Dressing:  Sterile dressing applied      Subjective: Gelsyn injection #2 left knee     Patient ID: John Gerardo is a 63 y o  female  HPI  Patient is here for her 2nd viscosupplementation injection for her left knee  She has been tolerating the series of injections well  Review of Systems   Constitutional: Positive for activity change  HENT: Negative  Eyes: Negative  Respiratory: Negative  Cardiovascular: Negative  Gastrointestinal: Negative  Endocrine: Negative  Musculoskeletal: Positive for arthralgias  Skin: Negative  Neurological: Negative  Psychiatric/Behavioral: Negative            Past Medical History:   Diagnosis Date    Anxiety     Cancer [facility])     thyroid    Closed rib fracture     x2    Disease of thyroid gland     cancer,had thyroidectomy    Fall     last assessed 5/14/16    Iron deficiency anemia     Viral gastroenteritis     last assessed 11/29/12       Past Surgical History: Procedure Laterality Date    APPENDECTOMY  2006    BREAST SURGERY Left 2001    CATARACT EXTRACTION Left     lens implant     SECTION      CHOLECYSTECTOMY      ESOPHAGOGASTRODUODENOSCOPY      diagnostic    GASTRIC BYPASS      HIP SURGERY Right     HYSTERECTOMY      total abdominal    JOINT REPLACEMENT      KNEE ARTHROSCOPY Bilateral     FL COLONOSCOPY FLX DX W/COLLJ SPEC WHEN PFRMD N/A 2017    Procedure: EGD AND COLONOSCOPY;  Surgeon: Thalia Funes MD;  [facility];   Service: Gastroenterology    FL FEMORAL FX, OPEN TX Right 10/10/2016    Procedure: FEMORAL NECK FIXATION ;  Surgeon: Seema Cobb MD;  [facility];  Service: Orthopedics    FL TREAT INTER/SUBTROCH FX,W/PLATE/SCREW Left     Procedure: OPEN REDUCTION W/ INTERNAL FIXATION (ORIF) HIP WITH PLATE AND SCREWS - Left hip dynamic hip screw;  Surgeon: Flavio Howe DO;  [facility];  Service: Orthopedics    THYROIDECTOMY Bilateral     with LN dissectomy       Family History   Problem Relation Age of Onset    Heart disease Mother         cardiac disorder    Diabetes Mother     Cancer Father         of mouth    Bone cancer Maternal Grandfather     Brain cancer Maternal Grandfather     Lung cancer Maternal Grandfather     Skin cancer Maternal Grandfather     Coronary artery disease Family     Osteoporosis Family     Rheum arthritis Family     Breast cancer Cousin     Melanoma Cousin     Lung cancer Paternal Aunt     Throat cancer Brother     No Known Problems Sister     No Known Problems Maternal Aunt     No Known Problems Maternal Uncle     No Known Problems Paternal Uncle     No Known Problems Maternal Grandmother     No Known Problems Paternal Grandmother     No Known Problems Paternal Grandfather     ADD / ADHD Neg Hx     Anesthesia problems Neg Hx     Clotting disorder Neg Hx     Collagen disease Neg Hx     Dislocations Neg Hx     Learning disabilities Neg Hx     Neurological problems Neg Hx     Rheumatologic disease Neg Hx     Scoliosis Neg Hx     Vascular Disease Neg Hx        Social History     Occupational History    Not on file       Social History Main Topics    Smoking status: Never Smoker    Smokeless tobacco: Never Used    Alcohol use Yes      Comment: moderate,social    Drug use: No    Sexual activity: Not on file         Current Outpatient Prescriptions:     acetaminophen (TYLENOL) 325 mg tablet, Take 650 mg by mouth every 6 (six) hours as needed for mild pain, Disp: , Rfl:     ALPRAZolam (XANAX) 0 5 mg tablet, Take 1 tablet (0 5 mg total) by mouth 2 (two) times a day as needed for anxiety, Disp: 60 tablet, Rfl: 3    Calcium Carbonate (CALCIUM 600) 1500 (600 CA) MG TABS, Take by mouth every morning  , Disp: , Rfl:     Cholecalciferol (VITAMIN D3) 5000 units CAPS, Take by mouth every morning  , Disp: , Rfl:     cyanocobalamin (VITAMIN B-12) 100 mcg tablet, Take by mouth daily  , Disp: , Rfl:     enoxaparin (LOVENOX) 40 mg/0 4 mL, Inject 0 4 mL (40 mg total) under the skin daily, Disp: 28 Syringe, Rfl: 0    Insulin Pen Needle (BD PEN NEEDLE WILLIAM U/F) 32G X 4 MM MISC, by Does not apply route daily, Disp: 90 each, Rfl: 3    loratadine (CLARITIN) 10 mg tablet, Take 10 mg by mouth daily, Disp: , Rfl:     SYNTHROID 112 MCG tablet, 112 mcg daily and  1 1/2 every other day, Disp: 108 tablet, Rfl: 1    teriparatide (FORTEO) 600 MCG/2 4ML injection, Inject 0 08 mL (20 mcg total) under the skin daily for 90 days, Disp: 7 2 mL, Rfl: 0    zolpidem (AMBIEN CR) 12 5 MG CR tablet, Take 1 tablet (12 5 mg total) by mouth daily at bedtime as needed for sleep, Disp: 30 tablet, Rfl: 3    FORTEO 600 MCG/2 4ML injection, Inject 0 08 mL (20 mcg total) under the skin daily for 90 days, Disp: 7 2 mL, Rfl: 5    Allergies   Allergen Reactions    Other     Scopolamine      Reaction Date: 10Aug2011;     Sulfa Antibiotics Hives    Sulfamethoxazole-Trimethoprim Reaction Date: 10Aug2011;        Objective:  Vitals:    02/01/19 1042   BP: 119/74   Pulse: 84       Body mass index is 23 96 kg/m²  Left Knee Exam     Tenderness   The patient is experiencing tenderness in the patella and lateral joint line  Range of Motion   Extension:  0 normal   Flexion:  130 (patellofemoral crepitus, lateral patellar facet tenderness) normal     Tests   Amanda:  Medial - negative Lateral - negative  Lachman:  Anterior - negative      Drawer:       Anterior - negative       Varus: negative  Valgus: negative  Patellar Apprehension: negative    Other   Erythema: absent  Scars: present (previous arthroscopy scars)  Sensation: normal  Pulse: present  Swelling: none  Effusion: no effusion present    Comments:  Collaterals stable at 0, 30, and 90 degrees   Thigh and calf soft and nontender  Ambulates with one crutch and antalgic gain on the left due to recent hip surgery            Observations   Left Knee   Negative for effusion  Physical Exam   Musculoskeletal:        Left knee: She exhibits no effusion  Nursing note and vitals reviewed  Bisi LOVELL    Division of Adult Reconstruction  Department of [facility]

## 2019-02-01 NOTE — LETTER
February 1, 2019     Patient: Mingo Campa   YOB: 1960   Date of Visit: 2/1/2019       To Whom it May Concern:    Mingo Campa is under my professional care  She was seen in my office on 2/1/2019  She underwent left dynamic hip screw procedure on 12/31/18 for a femoral neck stress fracture  X-rays at her 2 week appointment demonstrated no change in alignment the hardware or fracture site  She began with 50% weight-bearing, and at the discretion and under the direction of of her physical therapist, may advance her activities to weight-bearing as tolerated  We anticipate that she will be weight-bearing as tolerated by the time she follows up 2 weeks from now for repeat x-rays  If you have any questions or concerns, please don't hesitate to call           Sincerely,          Pancho Smith DO        CC: No Recipients

## 2019-02-08 ENCOUNTER — OFFICE VISIT (OUTPATIENT)
Dept: OBGYN CLINIC | Facility: CLINIC | Age: 59
End: 2019-02-08
Payer: COMMERCIAL

## 2019-02-08 VITALS
SYSTOLIC BLOOD PRESSURE: 124 MMHG | BODY MASS INDEX: 24.01 KG/M2 | HEART RATE: 81 BPM | HEIGHT: 67 IN | WEIGHT: 153 LBS | DIASTOLIC BLOOD PRESSURE: 80 MMHG

## 2019-02-08 DIAGNOSIS — M25.562 CHRONIC PAIN OF LEFT KNEE: ICD-10-CM

## 2019-02-08 DIAGNOSIS — G89.29 CHRONIC PAIN OF LEFT KNEE: ICD-10-CM

## 2019-02-08 DIAGNOSIS — M17.12 PRIMARY OSTEOARTHRITIS OF LEFT KNEE: Primary | ICD-10-CM

## 2019-02-08 PROCEDURE — 20610 DRAIN/INJ JOINT/BURSA W/O US: CPT | Performed by: PHYSICIAN ASSISTANT

## 2019-02-08 NOTE — PROGRESS NOTES
Assessment/Plan:  1  Primary osteoarthritis of left knee  Large joint arthrocentesis   2  Chronic pain of left knee  Large joint arthrocentesis     Patient is here for her 3rd and final viscosupplementation injection in her left knee for her activity related knee pain due to her underlying osteoarthritis  She has been tolerating the series of injections well  We will plan to see her back in 6 months for re-evaluation of her left knee  All of her questions were addressed today  Large joint arthrocentesis  Date/Time: 2/8/2019 10:31 AM  Consent given by: patient  Site marked: site marked  Timeout: Immediately prior to procedure a time out was called to verify the correct patient, procedure, equipment, support staff and site/side marked as required   Supporting Documentation  Indications: pain   Procedure Details  Location: knee - L knee  Preparation: Patient was prepped and draped in the usual sterile fashion  Needle size: 20 G  Ultrasound guidance: no  Approach: anterolateral  Medications administered: 2 mL Sodium Hyaluronate (Viscosup) 16 8 MG/2ML    Patient tolerance: patient tolerated the procedure well with no immediate complications  Dressing:  Sterile dressing applied      Subjective: Gelsyn injection #3 left knee     Patient ID: Preston Peralta is a 63 y o  female  HPI  Patient is here for her 2rd and final viscosupplementation injection for her left knee  She has been tolerating the series of injections well  She has not seen significant benefit at this point  She reports she is feeling under the weather this morning      Review of Systems   Constitutional: Negative  HENT: Negative  Eyes: Negative  Respiratory: Negative  Cardiovascular: Negative  Gastrointestinal: Negative  Endocrine: Negative  Musculoskeletal: Positive for arthralgias and myalgias  Skin: Negative  Neurological: Negative  Psychiatric/Behavioral: Negative            Past Medical History:   Diagnosis Date    Anxiety     Cancer [facility])     thyroid    Closed rib fracture     x2    Disease of thyroid gland     cancer,had thyroidectomy    Fall     last assessed 16    Iron deficiency anemia     Viral gastroenteritis     last assessed 12       Past Surgical History:   Procedure Laterality Date    APPENDECTOMY  2006    BREAST SURGERY Left 2001    CATARACT EXTRACTION Left     lens implant     SECTION      CHOLECYSTECTOMY      ESOPHAGOGASTRODUODENOSCOPY      diagnostic    GASTRIC BYPASS      HIP SURGERY Right     HYSTERECTOMY      total abdominal    JOINT REPLACEMENT      KNEE ARTHROSCOPY Bilateral     VT COLONOSCOPY FLX DX W/COLLJ SPEC WHEN PFRMD N/A 2017    Procedure: EGD AND COLONOSCOPY;  Surgeon: Beryle Homme, MD;  [facility];   Service: Gastroenterology    VT FEMORAL FX, OPEN TX Right 10/10/2016    Procedure: FEMORAL NECK FIXATION ;  Surgeon: Alea Ivey MD;  [facility];  Service: Orthopedics    VT TREAT INTER/SUBTROCH FX,W/PLATE/SCREW Left     Procedure: OPEN REDUCTION W/ INTERNAL FIXATION (ORIF) HIP WITH PLATE AND SCREWS - Left hip dynamic hip screw;  Surgeon: Tania Gomez DO;  [facility];  Service: Orthopedics    THYROIDECTOMY Bilateral     with LN dissectomy       Family History   Problem Relation Age of Onset    Heart disease Mother         cardiac disorder    Diabetes Mother     Cancer Father         of mouth    Bone cancer Maternal Grandfather     Brain cancer Maternal Grandfather     Lung cancer Maternal Grandfather     Skin cancer Maternal Grandfather     Coronary artery disease Family     Osteoporosis Family     Rheum arthritis Family     Breast cancer Cousin     Melanoma Cousin     Lung cancer Paternal Aunt     Throat cancer Brother     No Known Problems Sister     No Known Problems Maternal Aunt     No Known Problems Maternal Uncle     No Known Problems Paternal Uncle     No Known Problems Maternal Grandmother     No Known Problems Paternal Grandmother     No Known Problems Paternal Grandfather     ADD / ADHD Neg Hx     Anesthesia problems Neg Hx     Clotting disorder Neg Hx     Collagen disease Neg Hx     Dislocations Neg Hx     Learning disabilities Neg Hx     Neurological problems Neg Hx     Rheumatologic disease Neg Hx     Scoliosis Neg Hx     Vascular Disease Neg Hx        Social History     Occupational History    Not on file       Social History Main Topics    Smoking status: Never Smoker    Smokeless tobacco: Never Used    Alcohol use Yes      Comment: moderate,social    Drug use: No    Sexual activity: Not on file         Current Outpatient Prescriptions:     acetaminophen (TYLENOL) 325 mg tablet, Take 650 mg by mouth every 6 (six) hours as needed for mild pain, Disp: , Rfl:     ALPRAZolam (XANAX) 0 5 mg tablet, Take 1 tablet (0 5 mg total) by mouth 2 (two) times a day as needed for anxiety, Disp: 60 tablet, Rfl: 3    Calcium Carbonate (CALCIUM 600) 1500 (600 CA) MG TABS, Take by mouth every morning  , Disp: , Rfl:     Cholecalciferol (VITAMIN D3) 5000 units CAPS, Take by mouth every morning  , Disp: , Rfl:     cyanocobalamin (VITAMIN B-12) 100 mcg tablet, Take by mouth daily  , Disp: , Rfl:     Insulin Pen Needle (BD PEN NEEDLE WILLIAM U/F) 32G X 4 MM MISC, by Does not apply route daily, Disp: 90 each, Rfl: 3    loratadine (CLARITIN) 10 mg tablet, Take 10 mg by mouth daily, Disp: , Rfl:     SYNTHROID 112 MCG tablet, 112 mcg daily and  1 1/2 every other day, Disp: 108 tablet, Rfl: 1    teriparatide (FORTEO) 600 MCG/2 4ML injection, Inject 0 08 mL (20 mcg total) under the skin daily for 90 days, Disp: 7 2 mL, Rfl: 0    zolpidem (AMBIEN CR) 12 5 MG CR tablet, Take 1 tablet (12 5 mg total) by mouth daily at bedtime as needed for sleep, Disp: 30 tablet, Rfl: 3    Allergies   Allergen Reactions    Other     Scopolamine      Reaction Date: 10Aug2011;     Sulfa Antibiotics Hives    Sulfamethoxazole-Trimethoprim      Reaction Date: 10Aug2011;        Objective:  Vitals:    02/08/19 0958   BP: 124/80   Pulse: 81       Body mass index is 23 96 kg/m²  Left Knee Exam     Tenderness   The patient is experiencing tenderness in the patella and lateral joint line  Range of Motion   Extension:  0 normal   Flexion:  130 (patellofemoral crepitus, lateral patellar facet tenderness) normal     Tests   Amanda:  Medial - negative Lateral - negative  Lachman:  Anterior - negative      Drawer:       Anterior - negative       Varus: negative  Valgus: negative  Patellar Apprehension: negative    Other   Erythema: absent  Scars: present (previous arthroscopy scars)  Sensation: normal  Pulse: present  Swelling: none  Effusion: no effusion present          Observations   Left Knee   Negative for effusion  Physical Exam   Constitutional: She is oriented to person, place, and time  She appears well-developed and well-nourished  Body mass index is 23 96 kg/m²  HENT:   Head: Normocephalic and atraumatic  Eyes: EOM are normal    Neck: Normal range of motion  Cardiovascular: Intact distal pulses  Pulmonary/Chest: Effort normal    Musculoskeletal:        Left knee: She exhibits no effusion  See ortho exam   Neurological: She is alert and oriented to person, place, and time  Skin: Skin is warm and dry  Psychiatric: She has a normal mood and affect  Her behavior is normal  Judgment and thought content normal    Nursing note and vitals reviewed

## 2019-02-13 ENCOUNTER — OFFICE VISIT (OUTPATIENT)
Dept: OBGYN CLINIC | Facility: CLINIC | Age: 59
End: 2019-02-13

## 2019-02-13 ENCOUNTER — APPOINTMENT (OUTPATIENT)
Dept: RADIOLOGY | Facility: CLINIC | Age: 59
End: 2019-02-13
Payer: COMMERCIAL

## 2019-02-13 VITALS
BODY MASS INDEX: 24.01 KG/M2 | SYSTOLIC BLOOD PRESSURE: 146 MMHG | WEIGHT: 153 LBS | HEART RATE: 68 BPM | DIASTOLIC BLOOD PRESSURE: 91 MMHG | HEIGHT: 67 IN

## 2019-02-13 DIAGNOSIS — Z87.81 S/P ORIF (OPEN REDUCTION INTERNAL FIXATION) FRACTURE: ICD-10-CM

## 2019-02-13 DIAGNOSIS — Z87.81 S/P ORIF (OPEN REDUCTION INTERNAL FIXATION) FRACTURE: Primary | ICD-10-CM

## 2019-02-13 DIAGNOSIS — Z98.890 S/P ORIF (OPEN REDUCTION INTERNAL FIXATION) FRACTURE: Primary | ICD-10-CM

## 2019-02-13 DIAGNOSIS — Z98.890 S/P ORIF (OPEN REDUCTION INTERNAL FIXATION) FRACTURE: ICD-10-CM

## 2019-02-13 DIAGNOSIS — M84.353D STRESS FRACTURE OF NECK OF FEMUR WITH ROUTINE HEALING, SUBSEQUENT ENCOUNTER: ICD-10-CM

## 2019-02-13 PROCEDURE — 99024 POSTOP FOLLOW-UP VISIT: CPT | Performed by: ORTHOPAEDIC SURGERY

## 2019-02-13 PROCEDURE — 73502 X-RAY EXAM HIP UNI 2-3 VIEWS: CPT

## 2019-02-13 NOTE — LETTER
February 13, 2019     Patient: Jonatan Parish   YOB: 1960   Date of Visit: 2/13/2019       To Whom it May Concern:    Jonatan Parish is under my professional care  She was seen in my office on 2/13/2019  She may return to work on 2/25/19  Her return to work should be on a part time basis       If you have any questions or concerns, please don't hesitate to call           Sincerely,          Inell Raw, DO        CC: No Recipients

## 2019-02-13 NOTE — PROGRESS NOTES
Assessment/Plan:  1  S/P ORIF (open reduction internal fixation) fracture  XR hip/pelv 2-3 vws left if performed   2  Stress fracture of neck of femur with routine healing, subsequent encounter       Patient is here for 6 week follow-up status post ORIF in situ of her left femoral neck stress fracture  Overall I feel that she is doing well in her postoperative recovery and she is currently weight-bearing as tolerated without any assistive devices  She does have diffuse lower extremity complaints regarding her bilateral hips bilateral knees and bilateral ankles and I feel at this point is likely due to her recent in activity and subsequent regaining of activity and weight-bearing  I reassured her today that I expect this to resolve  I advised her that she may start to get back into her activities of enjoyment from a low-impact perspective  I recommend that she refrains from high impact activity for at least 6 more weeks  She should continue physical therapy and she may discontinue it when she has plateaued with them  She may return to work on a part-time basis starting at the end of February  A note was provided for her  I would like to see her back in 6 weeks time  No new x-rays are needed at her next visit and at that visit we will reassess her work status and overall function as she regains her activities of enjoyment    Subjective:  6 weeks status post ORIF left femoral neck stress fracture    Patient ID: Malini Campos is a 63 y o  female  HPI  Patient is here for follow-up regarding the above-stated complaint  She reports that she has diffuse bilateral lower extremity aching joints  Some days this pain is worse than others  She is currently ambulating without any assistive devices in an as tolerated fashion  She has been doing outpatient physical therapy and feels that she is making improvement    She does have some mild discomfort occasionally over the lateral aspect of her thigh around her incision  She has questions about returning to work in her overall limitations in activities moving forward      Review of Systems   Constitutional: Positive for activity change  HENT: Negative  Eyes: Negative  Respiratory: Negative  Cardiovascular: Negative  Gastrointestinal: Negative  Endocrine: Negative  Musculoskeletal: Positive for arthralgias  Skin: Negative  Neurological: Negative  Psychiatric/Behavioral: Negative  Past Medical History:   Diagnosis Date    Anxiety     Cancer [facility])     thyroid    Closed rib fracture     x2    Disease of thyroid gland     cancer,had thyroidectomy    Fall     last assessed 16    Iron deficiency anemia     Viral gastroenteritis     last assessed 12       Past Surgical History:   Procedure Laterality Date    APPENDECTOMY  2006    BREAST SURGERY Left 2001    CATARACT EXTRACTION Left     lens implant     SECTION      CHOLECYSTECTOMY      ESOPHAGOGASTRODUODENOSCOPY      diagnostic    GASTRIC BYPASS      HIP SURGERY Right     HYSTERECTOMY      total abdominal    JOINT REPLACEMENT      KNEE ARTHROSCOPY Bilateral     MT COLONOSCOPY FLX DX W/COLLJ SPEC WHEN PFRMD N/A 2017    Procedure: EGD AND COLONOSCOPY;  Surgeon: Jaclyn Connolly MD;  [facility];   Service: Gastroenterology    MT FEMORAL FX, OPEN TX Right 10/10/2016    Procedure: FEMORAL NECK FIXATION ;  Surgeon: Mich Marroquin MD;  Location:  [facility];  Service: Orthopedics    MT TREAT INTER/SUBTROCH FX,W/PLATE/SCREW Left     Procedure: OPEN REDUCTION W/ INTERNAL FIXATION (ORIF) HIP WITH PLATE AND SCREWS - Left hip dynamic hip screw;  Surgeon: Veronica Wing DO;  Location: [facility];  Service: Orthopedics    THYROIDECTOMY Bilateral     with LN dissectomy       Family History   Problem Relation Age of Onset    Heart disease Mother         cardiac disorder    Diabetes Mother     Cancer Father         of mouth    Bone cancer Maternal Grandfather     Brain cancer Maternal Grandfather     Lung cancer Maternal Grandfather     Skin cancer Maternal Grandfather     Coronary artery disease Family     Osteoporosis Family     Rheum arthritis Family     Breast cancer Cousin     Melanoma Cousin     Lung cancer Paternal Aunt     Throat cancer Brother     No Known Problems Sister     No Known Problems Maternal Aunt     No Known Problems Maternal Uncle     No Known Problems Paternal Uncle     No Known Problems Maternal Grandmother     No Known Problems Paternal Grandmother     No Known Problems Paternal Grandfather     ADD / ADHD Neg Hx     Anesthesia problems Neg Hx     Clotting disorder Neg Hx     Collagen disease Neg Hx     Dislocations Neg Hx     Learning disabilities Neg Hx     Neurological problems Neg Hx     Rheumatologic disease Neg Hx     Scoliosis Neg Hx     Vascular Disease Neg Hx        Social History     Occupational History    Not on file   Tobacco Use    Smoking status: Never Smoker    Smokeless tobacco: Never Used   Substance and Sexual Activity    Alcohol use: Yes     Comment: moderate,social    Drug use: No    Sexual activity: Not on file         Current Outpatient Medications:     acetaminophen (TYLENOL) 325 mg tablet, Take 650 mg by mouth every 6 (six) hours as needed for mild pain, Disp: , Rfl:     ALPRAZolam (XANAX) 0 5 mg tablet, Take 1 tablet (0 5 mg total) by mouth 2 (two) times a day as needed for anxiety, Disp: 60 tablet, Rfl: 3    Calcium Carbonate (CALCIUM 600) 1500 (600 CA) MG TABS, Take by mouth every morning  , Disp: , Rfl:     Cholecalciferol (VITAMIN D3) 5000 units CAPS, Take by mouth every morning  , Disp: , Rfl:     cyanocobalamin (VITAMIN B-12) 100 mcg tablet, Take by mouth daily  , Disp: , Rfl:     Insulin Pen Needle (BD PEN NEEDLE WILLIAM U/F) 32G X 4 MM MISC, by Does not apply route daily, Disp: 90 each, Rfl: 3    loratadine (CLARITIN) 10 mg tablet, Take 10 mg by mouth daily, Disp: , Rfl:     SYNTHROID 112 MCG tablet, 112 mcg daily and  1 1/2 every other day, Disp: 108 tablet, Rfl: 1    teriparatide (FORTEO) 600 MCG/2 4ML injection, Inject 0 08 mL (20 mcg total) under the skin daily for 90 days, Disp: 7 2 mL, Rfl: 0    zolpidem (AMBIEN CR) 12 5 MG CR tablet, Take 1 tablet (12 5 mg total) by mouth daily at bedtime as needed for sleep, Disp: 30 tablet, Rfl: 3    Allergies   Allergen Reactions    Other     Scopolamine      Reaction Date: 10Aug2011;     Sulfa Antibiotics Hives    Sulfamethoxazole-Trimethoprim      Reaction Date: 10Aug2011;        Objective:  Vitals:    02/13/19 1135   BP: 146/91   Pulse: 68       Body mass index is 23 96 kg/m²  Left Hip Exam     Tenderness   The patient is experiencing tenderness in the lateral     Range of Motion   Abduction: normal   Adduction: normal   Extension: normal   Flexion: normal   External rotation: normal   Internal rotation: normal     Muscle Strength   Abduction: 5/5   Adduction: 5/5   Flexion: 5/5     Other   Erythema: absent  Scars: present  Sensation: normal  Pulse: present    Comments:  She has some mild discomfort in the ayleen-incisional area with deep palpation  The scar is well healed  There is no external snapping of the hip  Negative Stinchfield neurovascular intact all compartments soft            Physical Exam   Constitutional: She is oriented to person, place, and time  She appears well-developed  HENT:   Head: Atraumatic  Eyes: EOM are normal    Neck: Neck supple  Cardiovascular: Normal rate  Pulmonary/Chest: Effort normal    Musculoskeletal:   See orthopedic exam   Neurological: She is alert and oriented to person, place, and time  Skin: Skin is warm and dry  Psychiatric: She has a normal mood and affect  Nursing note and vitals reviewed  I have personally reviewed pertinent films in PACS    X-rays of the left hip and pelvis obtained here in my office today reviewed by me demonstrate stable fixation of the left proximal femur with DHS construct  No sign of hardware failure  No lytic or blastic lesion  No fracture line in the femoral neck visible    Torres Coins D O    Division of Adult Reconstruction  Department of [facility]

## 2019-03-27 ENCOUNTER — OFFICE VISIT (OUTPATIENT)
Dept: OBGYN CLINIC | Facility: CLINIC | Age: 59
End: 2019-03-27

## 2019-03-27 VITALS
WEIGHT: 153 LBS | SYSTOLIC BLOOD PRESSURE: 158 MMHG | DIASTOLIC BLOOD PRESSURE: 89 MMHG | BODY MASS INDEX: 24.01 KG/M2 | HEIGHT: 67 IN | HEART RATE: 77 BPM

## 2019-03-27 DIAGNOSIS — Z98.890 POST-OPERATIVE STATE: ICD-10-CM

## 2019-03-27 DIAGNOSIS — M84.353D STRESS FRACTURE OF NECK OF FEMUR WITH ROUTINE HEALING, SUBSEQUENT ENCOUNTER: Primary | ICD-10-CM

## 2019-03-27 PROCEDURE — 99024 POSTOP FOLLOW-UP VISIT: CPT | Performed by: ORTHOPAEDIC SURGERY

## 2019-03-27 NOTE — PROGRESS NOTES
Assessment/Plan:  1  Stress fracture of neck of femur with routine healing, subsequent encounter     2  Post-operative state       Patient is here for approximately 3 month follow-up status post in situ fixation of her left femoral neck stress fracture  Overall she has done extremely well and is ready to get back to work full-time  She does have some it band complaints of anterior thigh pain and intermittent lateral hip pain that resolved on its own and is likely due to her we initiating her activities of enjoyment  We will keep an eye on this and if they do not resolve like they did on the other side I will be happy to see her back in the future  All of her questions were addressed today  She was given a note to return to work full time without limitations  All of her questions were addressed  She will see me back on an as-needed basis moving forward  Subjective:  3 month follow-up status post in situ fixation of left hip femoral neck stress fracture    Patient ID: Beto Rosado is a 63 y o  female  HPI  Patient is here for 3 month follow-up status post in situ fixation of her left femoral neck stress fracture  She says overall she is doing well  She still has some intermittent anterior left hip pain and intermittent lateral left hip pain is associated with activity that resolves on its own  She has been taking Advil and this seems to help  She states that she may have had a similar sensation on the contralateral side underwent the same procedure which eventually resolved as well  She denies any paresthesias down the leg  She is returning to activities of enjoyment such as yoga and working out as well as other low-impact activities  She is ready to return to work full-time without limitations  Review of Systems   Constitutional: Positive for activity change (Increasing)  HENT: Negative  Eyes: Negative  Respiratory: Negative  Cardiovascular: Negative      Gastrointestinal: Negative  Endocrine: Negative  Musculoskeletal: Positive for arthralgias  Negative for gait problem  Skin: Negative  Neurological: Negative  Psychiatric/Behavioral: Negative  Past Medical History:   Diagnosis Date    Anxiety     Cancer [facility])     thyroid    Closed rib fracture     x2    Disease of thyroid gland     cancer,had thyroidectomy    Fall     last assessed 16    Iron deficiency anemia     Viral gastroenteritis     last assessed 12       Past Surgical History:   Procedure Laterality Date    APPENDECTOMY  2006    BREAST SURGERY Left 2001    CATARACT EXTRACTION Left     lens implant     SECTION      CHOLECYSTECTOMY      ESOPHAGOGASTRODUODENOSCOPY      diagnostic    GASTRIC BYPASS      HIP SURGERY Right     HYSTERECTOMY      total abdominal    JOINT REPLACEMENT      KNEE ARTHROSCOPY Bilateral     FL COLONOSCOPY FLX DX W/COLLJ SPEC WHEN PFRMD N/A 2017    Procedure: EGD AND COLONOSCOPY;  Surgeon: Jonah Newton MD;  [facility];   Service: Gastroenterology    FL FEMORAL FX, OPEN TX Right 10/10/2016    Procedure: FEMORAL NECK FIXATION ;  Surgeon: Lucille Payan MD;  [facility];  Service: Orthopedics    FL TREAT INTER/SUBTROCH FX,W/PLATE/SCREW Left     Procedure: OPEN REDUCTION W/ INTERNAL FIXATION (ORIF) HIP WITH PLATE AND SCREWS - Left hip dynamic hip screw;  Surgeon: Raji Ricks DO;  Location: [address] [place] [address];  Service: Orthopedics    THYROIDECTOMY Bilateral     with LN dissectomy       Family History   Problem Relation Age of Onset    Heart disease Mother         cardiac disorder    Diabetes Mother     Cancer Father         of mouth    Bone cancer Maternal Grandfather     Brain cancer Maternal Grandfather     Lung cancer Maternal Grandfather     Skin cancer Maternal Grandfather     Coronary artery disease Family     Osteoporosis Family     Rheum arthritis Family     Breast cancer Cousin     Melanoma Cousin     Lung cancer Paternal Aunt     Throat cancer Brother     No Known Problems Sister     No Known Problems Maternal Aunt     No Known Problems Maternal Uncle     No Known Problems Paternal Uncle     No Known Problems Maternal Grandmother     No Known Problems Paternal Grandmother     No Known Problems Paternal Grandfather     ADD / ADHD Neg Hx     Anesthesia problems Neg Hx     Clotting disorder Neg Hx     Collagen disease Neg Hx     Dislocations Neg Hx     Learning disabilities Neg Hx     Neurological problems Neg Hx     Rheumatologic disease Neg Hx     Scoliosis Neg Hx     Vascular Disease Neg Hx        Social History     Occupational History    Not on file   Tobacco Use    Smoking status: Never Smoker    Smokeless tobacco: Never Used   Substance and Sexual Activity    Alcohol use: Yes     Comment: moderate,social    Drug use: No    Sexual activity: Not on file         Current Outpatient Medications:     acetaminophen (TYLENOL) 325 mg tablet, Take 650 mg by mouth every 6 (six) hours as needed for mild pain, Disp: , Rfl:     ALPRAZolam (XANAX) 0 5 mg tablet, Take 1 tablet (0 5 mg total) by mouth 2 (two) times a day as needed for anxiety, Disp: 60 tablet, Rfl: 3    Calcium Carbonate (CALCIUM 600) 1500 (600 CA) MG TABS, Take by mouth every morning  , Disp: , Rfl:     Cholecalciferol (VITAMIN D3) 5000 units CAPS, Take by mouth every morning  , Disp: , Rfl:     cyanocobalamin (VITAMIN B-12) 100 mcg tablet, Take by mouth daily  , Disp: , Rfl:     Insulin Pen Needle (BD PEN NEEDLE WILLIAM U/F) 32G X 4 MM MISC, by Does not apply route daily, Disp: 90 each, Rfl: 3    loratadine (CLARITIN) 10 mg tablet, Take 10 mg by mouth daily, Disp: , Rfl:     SYNTHROID 112 MCG tablet, 112 mcg daily and  1 1/2 every other day, Disp: 108 tablet, Rfl: 1    zolpidem (AMBIEN CR) 12 5 MG CR tablet, Take 1 tablet (12 5 mg total) by mouth daily at bedtime as needed for sleep, Disp: 30 tablet, Rfl: 3    Allergies   Allergen Reactions    Other     Scopolamine      Reaction Date: 10Aug2011;     Sulfa Antibiotics Hives    Sulfamethoxazole-Trimethoprim      Reaction Date: 10Aug2011;        Objective:  Vitals:    03/27/19 1655   BP: 158/89   Pulse: 77       Body mass index is 23 96 kg/m²  Left Hip Exam     Tenderness   The patient is experiencing no tenderness  Range of Motion   Abduction: normal   Adduction: normal   Extension: normal   Flexion: normal   External rotation: normal   Internal rotation: normal     Muscle Strength   Abduction: 5/5   Adduction: 5/5   Flexion: 5/5     Other   Erythema: absent  Scars: present  Sensation: normal  Pulse: present    Comments: The scar is well healed  There is no external snapping of the hip  Negative [place] neurovascular intact all compartments soft            Physical Exam   Constitutional: She is oriented to person, place, and time  She appears well-developed  HENT:   Head: Atraumatic  Eyes: EOM are normal    Neck: Neck supple  Cardiovascular: Normal rate  Pulmonary/Chest: Effort normal    Musculoskeletal:   See orthopedic exam   Neurological: She is alert and oriented to person, place, and time  Skin: Skin is warm and dry  Psychiatric: She has a normal mood and affect  Nursing note and vitals reviewed  Natalie LOVELL    Division of Adult Reconstruction  Department of [facility]

## 2019-03-27 NOTE — LETTER
March 27, 2019     Patient: Mickie Nichols   YOB: 1960   Date of Visit: 3/27/2019       To Whom it May Concern:    Mickie Nichols is under my professional care  She was seen in my office on 3/27/2019  She may return to work on 3/28/19 without restrictions  If you have any questions or concerns, please don't hesitate to call           Sincerely,          Antonieta Bergeron,         CC: No Recipients

## 2019-04-02 ENCOUNTER — TELEPHONE (OUTPATIENT)
Dept: OBGYN CLINIC | Facility: CLINIC | Age: 59
End: 2019-04-02

## 2019-04-23 ENCOUNTER — OFFICE VISIT (OUTPATIENT)
Dept: ENDOCRINOLOGY | Facility: CLINIC | Age: 59
End: 2019-04-23
Payer: COMMERCIAL

## 2019-04-23 VITALS
WEIGHT: 169 LBS | HEART RATE: 73 BPM | SYSTOLIC BLOOD PRESSURE: 138 MMHG | HEIGHT: 67 IN | DIASTOLIC BLOOD PRESSURE: 80 MMHG | BODY MASS INDEX: 26.53 KG/M2

## 2019-04-23 DIAGNOSIS — E55.9 VITAMIN D DEFICIENCY: ICD-10-CM

## 2019-04-23 DIAGNOSIS — E89.0 POSTOPERATIVE HYPOTHYROIDISM: Primary | ICD-10-CM

## 2019-04-23 DIAGNOSIS — C73 THYROID CANCER (HCC): ICD-10-CM

## 2019-04-23 DIAGNOSIS — M80.80XA LOCALIZED OSTEOPOROSIS WITH CURRENT PATHOLOGICAL FRACTURE, INITIAL ENCOUNTER: ICD-10-CM

## 2019-04-23 PROCEDURE — 99214 OFFICE O/P EST MOD 30 MIN: CPT | Performed by: INTERNAL MEDICINE

## 2019-04-24 ENCOUNTER — TELEPHONE (OUTPATIENT)
Dept: ENDOCRINOLOGY | Facility: CLINIC | Age: 59
End: 2019-04-24

## 2019-04-25 ENCOUNTER — TELEPHONE (OUTPATIENT)
Dept: ENDOCRINOLOGY | Facility: CLINIC | Age: 59
End: 2019-04-25

## 2019-05-22 DIAGNOSIS — F41.1 GENERALIZED ANXIETY DISORDER: ICD-10-CM

## 2019-05-22 DIAGNOSIS — G47.09 OTHER INSOMNIA: ICD-10-CM

## 2019-05-22 RX ORDER — ZOLPIDEM TARTRATE 12.5 MG/1
12.5 TABLET, FILM COATED, EXTENDED RELEASE ORAL
Qty: 30 TABLET | Refills: 3 | Status: SHIPPED | OUTPATIENT
Start: 2019-05-22 | End: 2020-06-27 | Stop reason: SDUPTHER

## 2019-05-22 RX ORDER — ALPRAZOLAM 0.5 MG/1
0.5 TABLET ORAL 2 TIMES DAILY PRN
Qty: 60 TABLET | Refills: 3 | Status: SHIPPED | OUTPATIENT
Start: 2019-05-22 | End: 2020-06-27 | Stop reason: SDUPTHER

## 2019-05-31 ENCOUNTER — OFFICE VISIT (OUTPATIENT)
Dept: OBGYN CLINIC | Facility: CLINIC | Age: 59
End: 2019-05-31
Payer: COMMERCIAL

## 2019-05-31 VITALS
HEIGHT: 67 IN | WEIGHT: 162.2 LBS | HEART RATE: 74 BPM | BODY MASS INDEX: 25.46 KG/M2 | DIASTOLIC BLOOD PRESSURE: 83 MMHG | SYSTOLIC BLOOD PRESSURE: 121 MMHG

## 2019-05-31 DIAGNOSIS — M17.12 PRIMARY OSTEOARTHRITIS OF LEFT KNEE: ICD-10-CM

## 2019-05-31 DIAGNOSIS — M25.462 EFFUSION OF LEFT KNEE: ICD-10-CM

## 2019-05-31 DIAGNOSIS — G89.29 CHRONIC PAIN OF LEFT KNEE: Primary | ICD-10-CM

## 2019-05-31 DIAGNOSIS — M25.562 CHRONIC PAIN OF LEFT KNEE: Primary | ICD-10-CM

## 2019-05-31 PROCEDURE — 99213 OFFICE O/P EST LOW 20 MIN: CPT | Performed by: ORTHOPAEDIC SURGERY

## 2019-06-04 ENCOUNTER — TELEPHONE (OUTPATIENT)
Dept: ENDOCRINOLOGY | Facility: CLINIC | Age: 59
End: 2019-06-04

## 2019-06-05 ENCOUNTER — TELEPHONE (OUTPATIENT)
Dept: OBGYN CLINIC | Facility: HOSPITAL | Age: 59
End: 2019-06-05

## 2019-06-05 ENCOUNTER — TELEPHONE (OUTPATIENT)
Dept: ENDOCRINOLOGY | Facility: CLINIC | Age: 59
End: 2019-06-05

## 2019-06-24 ENCOUNTER — TELEPHONE (OUTPATIENT)
Dept: OBGYN CLINIC | Facility: CLINIC | Age: 59
End: 2019-06-24

## 2019-06-24 DIAGNOSIS — M17.12 PRIMARY OSTEOARTHRITIS OF LEFT KNEE: Primary | ICD-10-CM

## 2019-06-28 ENCOUNTER — OFFICE VISIT (OUTPATIENT)
Dept: OBGYN CLINIC | Facility: CLINIC | Age: 59
End: 2019-06-28
Payer: COMMERCIAL

## 2019-06-28 VITALS
DIASTOLIC BLOOD PRESSURE: 79 MMHG | HEIGHT: 67 IN | BODY MASS INDEX: 25.55 KG/M2 | SYSTOLIC BLOOD PRESSURE: 126 MMHG | WEIGHT: 162.8 LBS | HEART RATE: 79 BPM

## 2019-06-28 DIAGNOSIS — G89.29 CHRONIC PAIN OF LEFT KNEE: ICD-10-CM

## 2019-06-28 DIAGNOSIS — M25.562 CHRONIC PAIN OF LEFT KNEE: ICD-10-CM

## 2019-06-28 DIAGNOSIS — M25.462 EFFUSION OF LEFT KNEE: ICD-10-CM

## 2019-06-28 DIAGNOSIS — M17.12 PRIMARY OSTEOARTHRITIS OF LEFT KNEE: Primary | ICD-10-CM

## 2019-06-28 PROCEDURE — 20610 DRAIN/INJ JOINT/BURSA W/O US: CPT | Performed by: ORTHOPAEDIC SURGERY

## 2019-06-28 PROCEDURE — 99213 OFFICE O/P EST LOW 20 MIN: CPT | Performed by: ORTHOPAEDIC SURGERY

## 2019-06-28 RX ORDER — TRIAMCINOLONE ACETONIDE 40 MG/ML
80 INJECTION, SUSPENSION INTRA-ARTICULAR; INTRAMUSCULAR
Status: COMPLETED | OUTPATIENT
Start: 2019-06-28 | End: 2019-06-28

## 2019-06-28 RX ORDER — BUPIVACAINE HYDROCHLORIDE 5 MG/ML
6 INJECTION, SOLUTION EPIDURAL; INTRACAUDAL
Status: COMPLETED | OUTPATIENT
Start: 2019-06-28 | End: 2019-06-28

## 2019-06-28 RX ADMIN — TRIAMCINOLONE ACETONIDE 80 MG: 40 INJECTION, SUSPENSION INTRA-ARTICULAR; INTRAMUSCULAR at 11:54

## 2019-06-28 RX ADMIN — BUPIVACAINE HYDROCHLORIDE 6 ML: 5 INJECTION, SOLUTION EPIDURAL; INTRACAUDAL at 11:54

## 2019-09-18 DIAGNOSIS — E03.9 HYPOTHYROIDISM, UNSPECIFIED TYPE: ICD-10-CM

## 2019-09-18 RX ORDER — LEVOTHYROXINE SODIUM 112 MCG
TABLET ORAL
Qty: 108 TABLET | Refills: 0 | Status: CANCELLED | OUTPATIENT
Start: 2019-09-18

## 2019-09-19 DIAGNOSIS — E03.9 HYPOTHYROIDISM, UNSPECIFIED TYPE: ICD-10-CM

## 2019-09-19 RX ORDER — LEVOTHYROXINE SODIUM 112 MCG
TABLET ORAL
Qty: 108 TABLET | Refills: 1 | Status: SHIPPED | OUTPATIENT
Start: 2019-09-19 | End: 2020-03-27 | Stop reason: SDUPTHER

## 2019-11-22 ENCOUNTER — HOSPITAL ENCOUNTER (EMERGENCY)
Facility: HOSPITAL | Age: 59
Discharge: HOME | End: 2019-11-22
Attending: EMERGENCY MEDICINE | Admitting: EMERGENCY MEDICINE
Payer: COMMERCIAL

## 2019-11-22 ENCOUNTER — APPOINTMENT (EMERGENCY)
Dept: ULTRASOUND IMAGING | Facility: HOSPITAL | Age: 59
End: 2019-11-22
Payer: COMMERCIAL

## 2019-11-22 VITALS
DIASTOLIC BLOOD PRESSURE: 80 MMHG | HEART RATE: 70 BPM | OXYGEN SATURATION: 99 % | TEMPERATURE: 97.9 F | RESPIRATION RATE: 18 BRPM | WEIGHT: 162 LBS | SYSTOLIC BLOOD PRESSURE: 155 MMHG | BODY MASS INDEX: 25.43 KG/M2 | HEIGHT: 67 IN

## 2019-11-22 DIAGNOSIS — R60.0 LOWER EXTREMITY EDEMA: Primary | ICD-10-CM

## 2019-11-22 PROCEDURE — 99283 EMERGENCY DEPT VISIT LOW MDM: CPT

## 2019-11-22 PROCEDURE — 99282 EMERGENCY DEPT VISIT SF MDM: CPT | Performed by: EMERGENCY MEDICINE

## 2019-11-22 PROCEDURE — 93971 EXTREMITY STUDY: CPT

## 2019-11-23 NOTE — ED PROVIDER NOTES
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  Patient is a 52-year-old female with a history of anemia and thyroid cancer who presents with left lower extremity swelling  Patient has noticed swelling in her left leg since last evening  She initially thought it was secondary to working a 12 hour shift but states that the swelling has persisted  She also had a recent flight to [place], [place]  She called her PCP who recommended she come to the emergency department to rule out DVT  Patient denies any history of similar swelling  She denies a history of DVT/PE  She denies any chest pain, shortness of breath, palpitations or other complaints  She denies any trauma to the left lower extremity  History provided by:  Patient  Ankle Swelling   Location:  Leg  Injury: no    Leg location:  L lower leg  Pain details:     Severity:  No pain    Duration:  1 day    Timing:  Constant    Progression:  Unchanged  Chronicity:  New  Ineffective treatments:  None tried  Associated symptoms: swelling    Associated symptoms: no back pain, no decreased ROM, no fever, no muscle weakness, no neck pain, no numbness and no tingling        Prior to Admission Medications   Prescriptions Last Dose Informant Patient Reported? Taking?    ALPRAZolam (XANAX) 0 5 mg tablet   No No   Sig: Take 1 tablet (0 5 mg total) by mouth 2 (two) times a day as needed for anxiety   Calcium Carbonate (CALCIUM 600) 1500 (600 CA) MG TABS  Self Yes No   Sig: Take by mouth every morning     Cholecalciferol (VITAMIN D3) 5000 units CAPS  Self Yes No   Sig: Take by mouth every morning     Insulin Pen Needle (BD PEN NEEDLE WILLIAM U/F) 32G X 4 MM MISC   No No   Sig: by Does not apply route daily   SYNTHROID 112 MCG tablet   No No   Si mcg daily and  1 1/2 every other day   acetaminophen (TYLENOL) 325 mg tablet   Yes No   Sig: Take 650 mg by mouth every 6 (six) hours as needed for mild pain   cyanocobalamin (VITAMIN B-12) 100 mcg tablet  Self Yes No   Sig: Take by mouth daily     loratadine (CLARITIN) 10 mg tablet  Self Yes No   Sig: Take 10 mg by mouth daily   zolpidem (AMBIEN CR) 12 5 MG CR tablet   No No   Sig: Take 1 tablet (12 5 mg total) by mouth daily at bedtime as needed for sleep      Facility-Administered Medications: None       Past Medical History:   Diagnosis Date    Anxiety     Cancer ([facility] [address] )     thyroid    Closed rib fracture     x2    Disease of thyroid gland     cancer,had thyroidectomy    Fall     last assessed 16    Iron deficiency anemia     Viral gastroenteritis     last assessed 12       Past Surgical History:   Procedure Laterality Date    APPENDECTOMY  2006    BREAST SURGERY Left     CATARACT EXTRACTION Left     lens implant     SECTION      CHOLECYSTECTOMY      ESOPHAGOGASTRODUODENOSCOPY      diagnostic    GASTRIC BYPASS      HIP SURGERY Right     HYSTERECTOMY      total abdominal    JOINT REPLACEMENT      KNEE ARTHROSCOPY Bilateral     AL COLONOSCOPY FLX DX W/COLLJ SPEC WHEN PFRMD N/A 2017    Procedure: EGD AND COLONOSCOPY;  Surgeon: Simin Pa MD;  [facility];   Service: Gastroenterology    AL FEMORAL FX, OPEN TX Right 10/10/2016    Procedure: FEMORAL NECK FIXATION ;  Surgeon: Hoover Ormond, MD;  Location:  Main OR;  Service: Orthopedics    AL TREAT INTER/SUBTROCH FX,W/PLATE/SCREW Left     Procedure: OPEN REDUCTION W/ INTERNAL FIXATION (ORIF) HIP WITH PLATE AND SCREWS - Left hip dynamic hip screw;  Surgeon: Manny Cooper DO;  Location: [facility];  Service: Orthopedics    THYROIDECTOMY Bilateral     with LN dissectomy       Family History   Problem Relation Age of Onset    Heart disease Mother         cardiac disorder    Diabetes Mother     Diabetes type I Mother     Cancer Father         of mouth    Bone cancer Maternal Grandfather     Brain cancer Maternal Grandfather     Lung cancer Maternal Grandfather     Skin cancer Maternal Grandfather     Coronary artery disease Family     Osteoporosis Family     Rheum arthritis Family     Breast cancer Cousin     Melanoma Cousin     Lung cancer Paternal Aunt     Throat cancer Brother     Diabetes type I Brother     Hypothyroidism Brother     No Known Problems Sister     No Known Problems Maternal Aunt     No Known Problems Maternal Uncle     No Known Problems Paternal Uncle     No Known Problems Maternal Grandmother     No Known Problems Paternal Grandmother     No Known Problems Paternal Grandfather     ADD / ADHD Neg Hx     Anesthesia problems Neg Hx     Clotting disorder Neg Hx     Collagen disease Neg Hx     Dislocations Neg Hx     Learning disabilities Neg Hx     Neurological problems Neg Hx     Rheumatologic disease Neg Hx     Scoliosis Neg Hx     Vascular Disease Neg Hx      I have reviewed and agree with the history as documented  Social History     Tobacco Use    Smoking status: Never Smoker    Smokeless tobacco: Never Used   Substance Use Topics    Alcohol use: Yes     Comment: moderate,social    Drug use: No        Review of Systems   Constitutional: Negative for chills, diaphoresis and fever  HENT: Negative for nosebleeds, sore throat and trouble swallowing  Eyes: Negative for photophobia, pain and visual disturbance  Respiratory: Negative for cough, chest tightness and shortness of breath  Cardiovascular: Positive for leg swelling  Negative for chest pain and palpitations  Gastrointestinal: Negative for abdominal pain, constipation, diarrhea, nausea and vomiting  Endocrine: Negative for polydipsia and polyuria     Genitourinary: Negative for difficulty urinating, dysuria, hematuria, pelvic pain, vaginal bleeding and vaginal discharge  Musculoskeletal: Negative for back pain, neck pain and neck stiffness  Skin: Negative for pallor and rash  Neurological: Negative for dizziness, seizures, light-headedness and headaches  All other systems reviewed and are negative  Physical Exam  Physical Exam   Constitutional: She is oriented to person, place, and time  She appears well-developed and well-nourished  No distress  HENT:   Head: Normocephalic and atraumatic  Mouth/Throat: Oropharynx is clear and moist and mucous membranes are normal    Eyes: Pupils are equal, round, and reactive to light  EOM are normal    Neck: Normal range of motion  Neck supple  Cardiovascular: Normal rate, regular rhythm, normal heart sounds, intact distal pulses and normal pulses  Pulmonary/Chest: Effort normal and breath sounds normal  No respiratory distress  Abdominal: Soft  She exhibits no distension  There is no tenderness  There is no rigidity, no rebound and no guarding  Musculoskeletal: Normal range of motion  She exhibits edema (1+ pitting edema in left lower extremity  )  She exhibits no tenderness  Lymphadenopathy:     She has no cervical adenopathy  Neurological: She is alert and oriented to person, place, and time  She has normal strength  No cranial nerve deficit or sensory deficit  Skin: Skin is warm and dry  Capillary refill takes less than 2 seconds  Psychiatric: She has a normal mood and affect  Nursing note and vitals reviewed        Vital Signs  ED Triage Vitals   Temperature Pulse Respirations Blood Pressure SpO2   11/22/19 2020 11/22/19 2020 11/22/19 2020 11/22/19 2020 11/22/19 2020   97 9 °F (36 6 °C) 72 18 (!) 173/92 99 %      Temp Source Heart Rate Source Patient Position - Orthostatic VS BP Location FiO2 (%)   11/22/19 2020 11/22/19 2207 11/22/19 2020 11/22/19 2020 --   Oral Monitor Sitting Left arm       Pain Score       11/22/19 2020       2           Vitals:    11/22/19 2020 11/22/19 2207   BP: (!) 173/92 155/80   Pulse: 72 70   Patient Position - Orthostatic VS: Sitting Sitting         Visual Acuity      ED Medications  Medications - No data to display    Diagnostic Studies  Results Reviewed     None                 VAS lower limb venous duplex study, unilateral/limited    (Results Pending)              Procedures  Procedures       ED Course  ED Course as of Nov 23 2138 Fri Nov 22, 2019   2213 DVT study negative  MDM  Number of Diagnoses or Management Options  Lower extremity edema: new and requires workup  Diagnosis management comments: Patient presents with unilateral leg swelling  No evidence of infectious process such as cellulitis, osteomyelitis, necrotizing fasciitis  LE Duplex negative for DVT  No history of trauma  Therefore, no indication additional imaging at this time  I recommended rest and elevation  I also recommended a repeat US in 1-2 weeks if symptoms persist  She agrees to follow up with PCP and return to ED if symptoms worsen or she develops chest pain, shortness of breath, other concerns  Amount and/or Complexity of Data Reviewed  Tests in the radiology section of CPT®: reviewed and ordered  Review and summarize past medical records: yes  Independent visualization of images, tracings, or specimens: yes    Risk of Complications, Morbidity, and/or Mortality  Presenting problems: moderate  Diagnostic procedures: low  Management options: low    Patient Progress  Patient progress: stable      Disposition  Final diagnoses:   Lower extremity edema     Time reflects when diagnosis was documented in both MDM as applicable and the Disposition within this note     Time User Action Codes Description Comment    11/22/2019 10:07 PM Xiomara Likes Add [R60 0] Lower extremity edema       ED Disposition     ED Disposition Condition Date/Time Comment    Discharge Stable Fri Nov 22, 2019 10:07 PM Niru Watkins discharge to home/self care  Follow-up Information     Follow up With Specialties Details Why Contact Info    Yee Jacome DO Family Medicine Schedule an appointment as soon as possible for a visit  Return for repeat ultrasound in 1-2 weeks if no improvement in edema  Return sooner if he develops chest pain, shortness of breath or other complaints  [address] [facility] [address] [facility]            Discharge Medication List as of 11/22/2019 10:07 PM      CONTINUE these medications which have NOT CHANGED    Details   acetaminophen (TYLENOL) 325 mg tablet Take 650 mg by mouth every 6 (six) hours as needed for mild pain, Historical Med      ALPRAZolam (XANAX) 0 5 mg tablet Take 1 tablet (0 5 mg total) by mouth 2 (two) times a day as needed for anxiety, Starting Wed 5/22/2019, Normal      Calcium Carbonate (CALCIUM 600) 1500 (600 CA) MG TABS Take by mouth every morning  , Historical Med      Cholecalciferol (VITAMIN D3) 5000 units CAPS Take by mouth every morning  , Historical Med      cyanocobalamin (VITAMIN B-12) 100 mcg tablet Take by mouth daily  , Historical Med      Insulin Pen Needle (BD PEN NEEDLE WILLIAM U/F) 32G X 4 MM MISC by Does not apply route daily, Starting Tue 1/22/2019, Normal      loratadine (CLARITIN) 10 mg tablet Take 10 mg by mouth daily, Historical Med      SYNTHROID 112 MCG tablet 112 mcg daily and  1 1/2 every other day, Normal      zolpidem (AMBIEN CR) 12 5 MG CR tablet Take 1 tablet (12 5 mg total) by mouth daily at bedtime as needed for sleep, Starting Wed 5/22/2019, Normal           No discharge procedures on file      ED Provider  Electronically Signed by           Kyaw Velasco DO  11/23/19 1808

## 2019-11-24 PROCEDURE — 93971 EXTREMITY STUDY: CPT | Performed by: SURGERY

## 2020-01-09 ENCOUNTER — OFFICE VISIT (OUTPATIENT)
Dept: OBGYN CLINIC | Facility: CLINIC | Age: 60
End: 2020-01-09
Payer: COMMERCIAL

## 2020-01-09 VITALS
WEIGHT: 166 LBS | DIASTOLIC BLOOD PRESSURE: 77 MMHG | SYSTOLIC BLOOD PRESSURE: 123 MMHG | BODY MASS INDEX: 26.06 KG/M2 | HEIGHT: 67 IN | HEART RATE: 80 BPM

## 2020-01-09 DIAGNOSIS — M25.462 EFFUSION OF LEFT KNEE: ICD-10-CM

## 2020-01-09 DIAGNOSIS — G89.29 CHRONIC PAIN OF LEFT KNEE: ICD-10-CM

## 2020-01-09 DIAGNOSIS — M17.12 PRIMARY OSTEOARTHRITIS OF LEFT KNEE: Primary | ICD-10-CM

## 2020-01-09 DIAGNOSIS — M25.562 CHRONIC PAIN OF LEFT KNEE: ICD-10-CM

## 2020-01-09 PROCEDURE — 99214 OFFICE O/P EST MOD 30 MIN: CPT | Performed by: ORTHOPAEDIC SURGERY

## 2020-01-09 PROCEDURE — 20610 DRAIN/INJ JOINT/BURSA W/O US: CPT | Performed by: ORTHOPAEDIC SURGERY

## 2020-01-09 RX ORDER — TRIAMCINOLONE ACETONIDE 40 MG/ML
80 INJECTION, SUSPENSION INTRA-ARTICULAR; INTRAMUSCULAR
Status: COMPLETED | OUTPATIENT
Start: 2020-01-09 | End: 2020-01-09

## 2020-01-09 RX ORDER — BUPIVACAINE HYDROCHLORIDE 5 MG/ML
6 INJECTION, SOLUTION EPIDURAL; INTRACAUDAL
Status: COMPLETED | OUTPATIENT
Start: 2020-01-09 | End: 2020-01-09

## 2020-01-09 RX ADMIN — BUPIVACAINE HYDROCHLORIDE 6 ML: 5 INJECTION, SOLUTION EPIDURAL; INTRACAUDAL at 08:32

## 2020-01-09 RX ADMIN — TRIAMCINOLONE ACETONIDE 80 MG: 40 INJECTION, SUSPENSION INTRA-ARTICULAR; INTRAMUSCULAR at 08:32

## 2020-01-09 NOTE — PROGRESS NOTES
Assessment/Plan:  1  Primary osteoarthritis of left knee  Large joint arthrocentesis: L knee   2  Chronic pain of left knee  Large joint arthrocentesis: L knee   3  Effusion of left knee  Large joint arthrocentesis: L knee     Scribe Attestation    I,:   Marietta Crigler am acting as a scribe while in the presence of the attending physician :        I,:   Chris Bryant, DO personally performed the services described in this documentation    as scribed in my presence :          Kuldeep Still is a very pleasant 71-year-old female who presents today for follow-up evaluation of her chronic activity related left knee pain  I'm pleased that she had lasting relief from her pain following the most recent corticosteroid injection administered in June of 2019  We did discuss the risks and benefits of a repeat aspiration and corticosteroid injection for pain relief today in the office  She was amenable to this and the injection was administered without issue and tolerated well by the patient  Post injection instructions were provided  She understands that repeat corticosteroid injection can occur no sooner than three months  We'll see her back on an as-needed basis      Large joint arthrocentesis: L knee  Date/Time: 1/9/2020 8:32 AM  Consent given by: patient  Site marked: site marked  Timeout: Immediately prior to procedure a time out was called to verify the correct patient, procedure, equipment, support staff and site/side marked as required   Supporting Documentation  Indications: pain   Procedure Details  Location: knee - L knee  Preparation: Patient was prepped and draped in the usual sterile fashion  Needle size: 18 G  Ultrasound guidance: no  Approach: superior  Medications administered: 6 mL bupivacaine (PF) 0 5 %; 80 mg triamcinolone acetonide 40 mg/mL    Aspirate amount: 16 mL  Aspirate: clear and yellow    Patient tolerance: patient tolerated the procedure well with no immediate complications  Dressing:  Sterile dressing applied          Subjective: Follow-up evaluation for activity related left knee pain    Patient ID: Carlos Pagan is a 63 y o  female  HPI  Michelle Curtis is a very pleasant 69-year-old female who presents today for follow-up evaluation for her chronic activity related left knee pain secondary to severe underlying osteoarthritis  She was last seen in June of 2019 and an aspiration and corticosteroid injection was administered for pain relief at that time  At today's visit, she states that she did receive excellent and lasting relief from her pain until the end of October  At that time, she began to experience a gradual return of her activity related left knee pain  She also states that she started personal training in November and that has exacerbated her pain  In addition to this, she performs yoga three days per week  She describes aching pain about the anterior and lateral aspect of her knee that increases with activity  Her pain is most severe with flexion past 90°  She denies any new injury or trauma  She denies any distal paresthesias of the lower extremity  Review of Systems   Constitutional: Positive for activity change  Negative for chills, fever and unexpected weight change  HENT: Negative for hearing loss, nosebleeds and sore throat  Eyes: Negative for pain, redness and visual disturbance  Respiratory: Negative for cough, shortness of breath and wheezing  Cardiovascular: Negative for chest pain, palpitations and leg swelling  Gastrointestinal: Negative for abdominal pain, nausea and vomiting  Endocrine: Negative for polydipsia and polyuria  Genitourinary: Negative for dysuria and hematuria  Musculoskeletal: Positive for arthralgias and joint swelling  Negative for myalgias  See HPI   Skin: Negative for rash and wound  Neurological: Negative for dizziness, numbness and headaches     Psychiatric/Behavioral: Negative for decreased concentration and suicidal ideas  The patient is not nervous/anxious  Past Medical History:   Diagnosis Date    Anxiety     Cancer [facility])     thyroid    Closed rib fracture     x2    Disease of thyroid gland     cancer,had thyroidectomy    Fall     last assessed 16    Iron deficiency anemia     Viral gastroenteritis     last assessed 12       Past Surgical History:   Procedure Laterality Date    APPENDECTOMY  2006    BREAST SURGERY Left 2001    CATARACT EXTRACTION Left     lens implant     SECTION      CHOLECYSTECTOMY      ESOPHAGOGASTRODUODENOSCOPY      diagnostic    GASTRIC BYPASS      HIP SURGERY Right     HYSTERECTOMY      total abdominal    JOINT REPLACEMENT      KNEE ARTHROSCOPY Bilateral     W[place] COLONOSCOPY FLX DX W/COLLJ SPEC WHEN PFRMD N/A 2017    Procedure: EGD AND COLONOSCOPY;  Surgeon: Dragan Dowling MD;  [facility];   Service: Gastroenterology    [place] FEMORAL FX, OPEN TX Right 10/10/2016    Procedure: FEMORAL NECK FIXATION ;  Surgeon: Jena Cantu MD;  [facility];  Service: Orthopedics    W[place] TREAT INTER/SUBTROCH FX,W/PLATE/SCREW Left     Procedure: OPEN REDUCTION W/ INTERNAL FIXATION (ORIF) HIP WITH PLATE AND SCREWS - Left hip dynamic hip screw;  Surgeon: Janice Daley DO;  Location: [address] [place] [address];  Service: Orthopedics    THYROIDECTOMY Bilateral     with LN dissectomy       Family History   Problem Relation Age of Onset    Heart disease Mother         cardiac disorder    Diabetes Mother     Diabetes type I Mother     Cancer Father         of mouth    Bone cancer Maternal Grandfather     Brain cancer Maternal Grandfather     Lung cancer Maternal Grandfather     Skin cancer Maternal Grandfather     Coronary artery disease Family     Osteoporosis Family     Rheum arthritis Family     Breast cancer Cousin     Melanoma Cousin     Lung cancer Paternal Aunt     Throat cancer Brother     Diabetes type I Brother     Hypothyroidism Brother     No Known Problems Sister     No Known Problems Maternal Aunt     No Known Problems Maternal Uncle     No Known Problems Paternal Uncle     No Known Problems Maternal Grandmother     No Known Problems Paternal Grandmother     No Known Problems Paternal Grandfather     ADD / ADHD Neg Hx     Anesthesia problems Neg Hx     Clotting disorder Neg Hx     Collagen disease Neg Hx     Dislocations Neg Hx     Learning disabilities Neg Hx     Neurological problems Neg Hx     Rheumatologic disease Neg Hx     Scoliosis Neg Hx     Vascular Disease Neg Hx        Social History     Occupational History    Not on file   Tobacco Use    Smoking status: Never Smoker    Smokeless tobacco: Never Used   Substance and Sexual Activity    Alcohol use: Yes     Comment: moderate,social    Drug use: No    Sexual activity: Not on file         Current Outpatient Medications:     acetaminophen (TYLENOL) 325 mg tablet, Take 650 mg by mouth every 6 (six) hours as needed for mild pain, Disp: , Rfl:     ALPRAZolam (XANAX) 0 5 mg tablet, Take 1 tablet (0 5 mg total) by mouth 2 (two) times a day as needed for anxiety, Disp: 60 tablet, Rfl: 3    Calcium Carbonate (CALCIUM 600) 1500 (600 CA) MG TABS, Take by mouth every morning  , Disp: , Rfl:     Cholecalciferol (VITAMIN D3) 5000 units CAPS, Take by mouth every morning  , Disp: , Rfl:     loratadine (CLARITIN) 10 mg tablet, Take 10 mg by mouth daily, Disp: , Rfl:     SYNTHROID 112 MCG tablet, 112 mcg daily and  1 1/2 every other day, Disp: 108 tablet, Rfl: 1    zolpidem (AMBIEN CR) 12 5 MG CR tablet, Take 1 tablet (12 5 mg total) by mouth daily at bedtime as needed for sleep, Disp: 30 tablet, Rfl: 3    Insulin Pen Needle (BD PEN NEEDLE WILLIAM U/F) 32G X 4 MM MISC, by Does not apply route daily, Disp: 90 each, Rfl: 3    Allergies   Allergen Reactions    Other     Scopolamine      Reaction Date: 10Aug2011;     Sulfa Antibiotics Hives    Sulfamethoxazole-Trimethoprim      Reaction Date: 10Aug2011;        Objective:  Vitals:    01/09/20 0757   BP: 123/77   Pulse: 80       Body mass index is 26 kg/m²  Left Knee Exam     Tenderness   The patient is experiencing tenderness in the lateral joint line and patella  Range of Motion   Left knee extension: 0  Left knee flexion: 120  Tests   Varus: negative Valgus: negative  Drawer:  Anterior - negative     Posterior - negative    Other   Erythema: absent  Scars: absent  Sensation: normal  Pulse: present  Swelling: none  Effusion: effusion present    Comments:  Stable at 0, 30 and 90°  Neurovascularly intact distally  No warmth, erythema or signs of infection  Parapatellar crepitance noted          Observations   Left Knee   Positive for effusion  Physical Exam   Constitutional: She is oriented to person, place, and time  She appears well-developed and well-nourished  HENT:   Head: Normocephalic and atraumatic  Eyes: Pupils are equal, round, and reactive to light  Conjunctivae are normal    Neck: Normal range of motion  Neck supple  Cardiovascular: Normal rate and intact distal pulses  Pulmonary/Chest: Effort normal  No respiratory distress  Musculoskeletal:        Left knee: She exhibits effusion  As noted in HPI   Neurological: She is alert and oriented to person, place, and time  Skin: Skin is warm and dry  Psychiatric: She has a normal mood and affect  Her behavior is normal    Nursing note and vitals reviewed

## 2020-01-15 ENCOUNTER — OFFICE VISIT (OUTPATIENT)
Dept: FAMILY MEDICINE CLINIC | Facility: CLINIC | Age: 60
End: 2020-01-15
Payer: COMMERCIAL

## 2020-01-15 VITALS
HEART RATE: 101 BPM | OXYGEN SATURATION: 98 % | HEIGHT: 67 IN | WEIGHT: 163 LBS | SYSTOLIC BLOOD PRESSURE: 130 MMHG | BODY MASS INDEX: 25.58 KG/M2 | DIASTOLIC BLOOD PRESSURE: 90 MMHG | RESPIRATION RATE: 13 BRPM | TEMPERATURE: 97.6 F

## 2020-01-15 DIAGNOSIS — E89.0 POSTOPERATIVE HYPOTHYROIDISM: ICD-10-CM

## 2020-01-15 DIAGNOSIS — Z00.00 ANNUAL PHYSICAL EXAM: Primary | ICD-10-CM

## 2020-01-15 DIAGNOSIS — K91.2 POSTGASTRECTOMY MALABSORPTION: ICD-10-CM

## 2020-01-15 DIAGNOSIS — Z90.3 POSTGASTRECTOMY MALABSORPTION: ICD-10-CM

## 2020-01-15 DIAGNOSIS — D50.9 IRON DEFICIENCY ANEMIA, UNSPECIFIED IRON DEFICIENCY ANEMIA TYPE: ICD-10-CM

## 2020-01-15 DIAGNOSIS — M80.80XA LOCALIZED OSTEOPOROSIS WITH CURRENT PATHOLOGICAL FRACTURE, INITIAL ENCOUNTER: ICD-10-CM

## 2020-01-15 DIAGNOSIS — Z13.6 ENCOUNTER FOR LIPID SCREENING FOR CARDIOVASCULAR DISEASE: ICD-10-CM

## 2020-01-15 DIAGNOSIS — Z13.220 ENCOUNTER FOR LIPID SCREENING FOR CARDIOVASCULAR DISEASE: ICD-10-CM

## 2020-01-15 DIAGNOSIS — C73 THYROID CANCER (HCC): ICD-10-CM

## 2020-01-15 DIAGNOSIS — E55.9 VITAMIN D DEFICIENCY: ICD-10-CM

## 2020-01-15 DIAGNOSIS — Z12.31 ENCOUNTER FOR SCREENING MAMMOGRAM FOR BREAST CANCER: ICD-10-CM

## 2020-01-15 PROBLEM — K21.9 GERD (GASTROESOPHAGEAL REFLUX DISEASE): Status: RESOLVED | Noted: 2017-11-08 | Resolved: 2020-01-15

## 2020-01-15 PROCEDURE — 99396 PREV VISIT EST AGE 40-64: CPT | Performed by: FAMILY MEDICINE

## 2020-01-15 NOTE — PROGRESS NOTES
[address] [facility]    NAME: Kelly Carvajal  AGE: 63 y o  SEX: female  : 1960     DATE: 1/15/2020     Assessment and Plan:     Problem List Items Addressed This Visit        Digestive    Postgastrectomy malabsorption     stable            Endocrine    Postoperative hypothyroidism    Relevant Orders    Comprehensive metabolic panel    TSH, 3rd generation with Free T4 reflex    Thyroid cancer (HCC)     No evidence of reoccurance            Musculoskeletal and Integument    Osteoporosis     Cont forteo and reclast            Other    Anemia    Relevant Orders    CBC    Vitamin D deficiency    Relevant Orders    Vitamin D 25 hydroxy      Other Visit Diagnoses     Annual physical exam    -  Primary    Encounter for screening mammogram for breast cancer        Relevant Orders    Mammo screening bilateral w 3d & cad    Encounter for lipid screening for cardiovascular disease        Relevant Orders    Lipid Panel with Direct LDL reflex          Immunizations and preventive care screenings were discussed with patient today  Appropriate education was printed on patient's after visit summary  Counseling:  · Dental Health: discussed importance of regular tooth brushing, flossing, and dental visits  BMI Counseling: Body mass index is 25 23 kg/m²  The BMI is above normal  Nutrition recommendations include encouraging healthy choices of fruits and vegetables  Exercise recommendations include exercising 3-5 times per week  No pharmacotherapy was ordered  No follow-ups on file  Chief Complaint:     Chief Complaint   Patient presents with    Annual Exam     Patient here for annual wellness exam       History of Present Illness:     Adult Annual Physical   Patient here for a comprehensive physical exam  The patient reports no problems  Diet and Physical Activity  · Diet/Nutrition: well balanced diet     · Exercise: 5-7 times a week on average  Depression Screening  PHQ-9 Depression Screening    PHQ-9:    Frequency of the following problems over the past two weeks:       Little interest or pleasure in doing things:  0 - not at all  Feeling down, depressed, or hopeless:  0 - not at all  PHQ-2 Score:  0       General Health  · Sleep: sleeps well  · Hearing: normal - bilateral   · Vision: no vision problems and most recent eye exam <1 year ago  · Dental: regular dental visits and brushes teeth twice daily  /GYN Health  · Patient is: postmenopausal  · Last menstrual period: n/a  · Contraceptive method: n/a  Review of Systems:     Review of Systems   Constitutional: Negative  HENT: Negative  Eyes: Negative  Respiratory: Negative  Cardiovascular: Negative  Gastrointestinal: Negative  Endocrine: Negative  Genitourinary: Negative  Musculoskeletal: Positive for arthralgias  Skin: Negative  Allergic/Immunologic: Negative  Neurological: Negative  Hematological: Negative  Psychiatric/Behavioral: Negative         Past Medical History:     Past Medical History:   Diagnosis Date    Anxiety     Cancer [facility])     thyroid    Closed rib fracture     x2    Disease of thyroid gland     cancer,had thyroidectomy    Fall     last assessed 16    Femoral neck stress fracture 10/10/2016    Iron deficiency anemia     Viral gastroenteritis     last assessed 12      Past Surgical History:     Past Surgical History:   Procedure Laterality Date    APPENDECTOMY  2006    BREAST SURGERY Left 2001    CATARACT EXTRACTION Left     lens implant     SECTION      CHOLECYSTECTOMY      ESOPHAGOGASTRODUODENOSCOPY      diagnostic    GASTRIC BYPASS      HIP SURGERY Right     HYSTERECTOMY      total abdominal    JOINT REPLACEMENT      KNEE ARTHROSCOPY Bilateral     AK COLONOSCOPY FLX DX W/COLLJ SPEC WHEN PFRMD N/A 2017    Procedure: EGD AND COLONOSCOPY;  Surgeon: Claire Vivas MD; [facility];   Service: Gastroenterology    ND FEMORAL FX, OPEN TX Right 10/10/2016    Procedure: FEMORAL NECK FIXATION ;  Surgeon: Tg Marroquin MD;  [facility];  Service: Orthopedics    ND TREAT INTER/SUBTROCH FX,W/PLATE/SCREW Left 19/29/0424    Procedure: OPEN REDUCTION W/ INTERNAL FIXATION (ORIF) HIP WITH PLATE AND SCREWS - Left hip dynamic hip screw;  Surgeon: Chris Bryant DO;  [facility];  Service: Orthopedics    THYROIDECTOMY Bilateral     with LN dissectomy      Social History:     Social History     Socioeconomic History    Marital status:      Spouse name: Not on file    Number of children: Not on file    Years of education: Not on file    Highest education level: Not on file   Occupational History    Not on file   Social Needs    Financial resource strain: Not on file    Food insecurity:     Worry: Not on file     Inability: Not on file    Transportation needs:     Medical: Not on file     Non-medical: Not on file   Tobacco Use    Smoking status: Never Smoker    Smokeless tobacco: Never Used   Substance and Sexual Activity    Alcohol use: Yes     Comment: moderate,social    Drug use: No    Sexual activity: Not on file   Lifestyle    Physical activity:     Days per week: Not on file     Minutes per session: Not on file    Stress: Not on file   Relationships    Social connections:     Talks on phone: Not on file     Gets together: Not on file     Attends [employer] service: Not on file     Active member of club or organization: Not on file     Attends meetings of clubs or organizations: Not on file     Relationship status: Not on file    Intimate partner violence:     Fear of current or ex partner: Not on file     Emotionally abused: Not on file     Physically abused: Not on file     Forced sexual activity: Not on file   Other Topics Concern    Not on file   Social History Narrative    Caffeine use    Daily coffee 4 cups a day    Exercises regularly      Family History:     Family History   Problem Relation Age of Onset    Heart disease Mother         cardiac disorder    Diabetes Mother     Diabetes type I Mother     Cancer Father         of mouth    Bone cancer Maternal Grandfather     Brain cancer Maternal Grandfather     Lung cancer Maternal Grandfather     Skin cancer Maternal Grandfather     Coronary artery disease Family     Osteoporosis Family     Rheum arthritis Family     Breast cancer Cousin     Melanoma Cousin     Lung cancer Paternal Aunt     Throat cancer Brother     Diabetes type I Brother     Hypothyroidism Brother     No Known Problems Sister     No Known Problems Maternal Aunt     No Known Problems Maternal Uncle     No Known Problems Paternal Uncle     No Known Problems Maternal Grandmother     No Known Problems Paternal Grandmother     No Known Problems Paternal Grandfather     ADD / ADHD Neg Hx     Anesthesia problems Neg Hx     Clotting disorder Neg Hx     Collagen disease Neg Hx     Dislocations Neg Hx     Learning disabilities Neg Hx     Neurological problems Neg Hx     Rheumatologic disease Neg Hx     Scoliosis Neg Hx     Vascular Disease Neg Hx       Current Medications:     Current Outpatient Medications   Medication Sig Dispense Refill    acetaminophen (TYLENOL) 325 mg tablet Take 650 mg by mouth every 6 (six) hours as needed for mild pain      ALPRAZolam (XANAX) 0 5 mg tablet Take 1 tablet (0 5 mg total) by mouth 2 (two) times a day as needed for anxiety 60 tablet 3    Cholecalciferol (VITAMIN D3) 5000 units CAPS Take by mouth every morning        loratadine (CLARITIN) 10 mg tablet Take 10 mg by mouth as needed       SYNTHROID 112 MCG tablet 112 mcg daily and  1 1/2 every other day 108 tablet 1    zolpidem (AMBIEN CR) 12 5 MG CR tablet Take 1 tablet (12 5 mg total) by mouth daily at bedtime as needed for sleep 30 tablet 3    Calcium Carbonate (CALCIUM 600) 1500 (600 CA) MG TABS Take by mouth every morning        Insulin Pen Needle (BD PEN NEEDLE WILLIAM U/F) 32G X 4 MM MISC by Does not apply route daily (Patient not taking: Reported on 1/15/2020) 90 each 3     No current facility-administered medications for this visit  Allergies: Allergies   Allergen Reactions    Other     Scopolamine      Reaction Date: 10Aug2011;     Sulfa Antibiotics Hives    Sulfamethoxazole-Trimethoprim      Reaction Date: 10Aug2011;       Physical Exam:     /90   Pulse 101   Temp 97 6 °F (36 4 °C)   Resp 13   Ht 5' 7 4" (1 712 m)   Wt 73 9 kg (163 lb)   SpO2 98%   BMI 25 23 kg/m²     Physical Exam   Constitutional: She is oriented to person, place, and time  Vital signs are normal  She appears well-developed and well-nourished  HENT:   Head: Normocephalic and atraumatic  Nose: Nose normal    Mouth/Throat: Oropharynx is clear and moist    Eyes: Pupils are equal, round, and reactive to light  Conjunctivae, EOM and lids are normal    Neck: Normal range of motion  Neck supple  Cardiovascular: Normal rate, regular rhythm, S1 normal, S2 normal, normal heart sounds and intact distal pulses  Pulmonary/Chest: Effort normal and breath sounds normal    Abdominal: Soft  Bowel sounds are normal    Musculoskeletal: Normal range of motion  Neurological: She is alert and oriented to person, place, and time  She has normal strength and normal reflexes  Skin: Skin is warm, dry and intact  Psychiatric: She has a normal mood and affect  Her speech is normal and behavior is normal  Judgment and thought content normal  Cognition and memory are normal    Nursing note and vitals reviewed        Noemi Connelly, DO  [address] [place]

## 2020-03-12 ENCOUNTER — TELEPHONE (OUTPATIENT)
Dept: OBGYN CLINIC | Facility: CLINIC | Age: 60
End: 2020-03-12

## 2020-03-12 ENCOUNTER — OFFICE VISIT (OUTPATIENT)
Dept: OBGYN CLINIC | Facility: CLINIC | Age: 60
End: 2020-03-12
Payer: COMMERCIAL

## 2020-03-12 VITALS
BODY MASS INDEX: 25.58 KG/M2 | HEIGHT: 67 IN | HEART RATE: 72 BPM | WEIGHT: 163 LBS | SYSTOLIC BLOOD PRESSURE: 138 MMHG | DIASTOLIC BLOOD PRESSURE: 85 MMHG

## 2020-03-12 DIAGNOSIS — M17.12 PRIMARY OSTEOARTHRITIS OF LEFT KNEE: ICD-10-CM

## 2020-03-12 DIAGNOSIS — M17.12 PRIMARY OSTEOARTHRITIS OF LEFT KNEE: Primary | ICD-10-CM

## 2020-03-12 DIAGNOSIS — G89.29 CHRONIC PAIN OF LEFT KNEE: ICD-10-CM

## 2020-03-12 DIAGNOSIS — M25.562 CHRONIC PAIN OF LEFT KNEE: ICD-10-CM

## 2020-03-12 PROCEDURE — 99213 OFFICE O/P EST LOW 20 MIN: CPT | Performed by: ORTHOPAEDIC SURGERY

## 2020-03-12 PROCEDURE — 3008F BODY MASS INDEX DOCD: CPT | Performed by: ORTHOPAEDIC SURGERY

## 2020-03-12 PROCEDURE — 1036F TOBACCO NON-USER: CPT | Performed by: ORTHOPAEDIC SURGERY

## 2020-03-12 RX ORDER — MELOXICAM 7.5 MG/1
7.5 TABLET ORAL DAILY
Qty: 30 TABLET | Refills: 2 | Status: SHIPPED | OUTPATIENT
Start: 2020-03-12 | End: 2021-01-27

## 2020-03-12 RX ORDER — MELOXICAM 7.5 MG/1
7.5 TABLET ORAL DAILY
Qty: 30 TABLET | Refills: 2 | Status: SHIPPED | OUTPATIENT
Start: 2020-03-12 | End: 2020-03-12 | Stop reason: SDUPTHER

## 2020-03-12 NOTE — TELEPHONE ENCOUNTER
Dr Peterson   #: [phone]       Patient called in requesting medication to be sent to [facility] in [facility]   Please advise, thank you      Address: [facility], [address] [place] [address]

## 2020-03-12 NOTE — PROGRESS NOTES
Assessment/Plan:  1  Primary osteoarthritis of left knee  DISCONTINUED: meloxicam (MOBIC) 7 5 mg tablet   2  Chronic pain of left knee  DISCONTINUED: meloxicam (MOBIC) 7 5 mg tablet     Adeola is a pleasant 41-year-old female presenting today for follow-up of her activity related left knee pain  We had a long discussion with her today about the nature of her pain or overall prognosis  Her MRI from last year did not show any pathology that would be readily addressed with arthroscopy  She does have lateral and patellofemoral compartment arthritis, we feel strongly that the activities that she is engaging in our exacerbating her symptoms  Unfortunately, she does not have the severity of arthritis to merit a total knee arthroplasty at this time and she has exhausted all conservative treatments  It is too early to repeat a cortisone injection at this time  We have encouraged her to modify her activities to avoid things such as squats which will aggravate her knee  Additionally, we did prescribe her Mobic to take daily with food  She states that despite her gastric bypass in 2003, she is able to tolerate NSAIDs without issue, but she is aware of potential side effects and will discontinue Mobic if she experiences any of them  We will plan to see her back in 2-3 months pending the efficacy of activity modification and Mobic  When she returns, we would like x-rays on arrival of her left knee  She expressed understanding all of her questions were addressed today  Subjective: Left knee follow up    Patient ID: Gretta Nissen is a 62 y o  female  Adeola is a pleasant  41-year-old female well known to our practice for her activity related left knee pain due to osteoarthritis  She underwent an aspiration in cortisone injection approximately 2 months ago, which provided 1 months worth of relief  She states that she has still been active with her  doing activities such as squats   She is active in yoga and 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, as well as waitressing  She feels the discomfort with increased activity  She has taken ibuprofen as needed despite her history of gastric bypass in   She states that she has been doing this without significant issue  Review of Systems   Constitutional: Negative  HENT: Negative  Eyes: Negative  Respiratory: Negative  Cardiovascular: Negative  Gastrointestinal: Negative  Endocrine: Negative  Genitourinary: Negative  Musculoskeletal: Positive for arthralgias and joint swelling  Skin: Negative  Allergic/Immunologic: Negative  Neurological: Negative  Hematological: Negative  Psychiatric/Behavioral: Negative  Past Medical History:   Diagnosis Date    Anxiety     Cancer [facility])     thyroid    Closed rib fracture     x2    Disease of thyroid gland     cancer,had thyroidectomy    Fall     last assessed 16    Femoral neck stress fracture 10/10/2016    Iron deficiency anemia     Viral gastroenteritis     last assessed 12       Past Surgical History:   Procedure Laterality Date    APPENDECTOMY  2006    BREAST SURGERY Left 2001    CATARACT EXTRACTION Left     lens implant     SECTION      CHOLECYSTECTOMY      ESOPHAGOGASTRODUODENOSCOPY      diagnostic    GASTRIC BYPASS      HIP SURGERY Right     HYSTERECTOMY      total abdominal    JOINT REPLACEMENT      KNEE ARTHROSCOPY Bilateral     MO COLONOSCOPY FLX DX W/COLLJ SPEC WHEN PFRMD N/A 2017    Procedure: EGD AND COLONOSCOPY;  Surgeon: Kyaw Thao MD;  [facility];   Service: Gastroenterology    MO FEMORAL FX, OPEN TX Right 10/10/2016    Procedure: FEMORAL NECK FIXATION ;  Surgeon: Heriberto Triplett MD;  [facility];  Service: Orthopedics    MO TREAT INTER/SUBTROCH FX,W/PLATE/SCREW Left 5064    Procedure: OPEN REDUCTION W/ INTERNAL FIXATION (ORIF) HIP WITH PLATE AND SCREWS - Left hip dynamic hip screw;  Surgeon: Ranjit Mckeon DO;  [facility];  Service: Orthopedics    THYROIDECTOMY Bilateral     with LN dissectomy       Family History   Problem Relation Age of Onset    Heart disease Mother         cardiac disorder    Diabetes Mother     Diabetes type I Mother     Cancer Father         of mouth    Bone cancer Maternal Grandfather     Brain cancer Maternal Grandfather     Lung cancer Maternal Grandfather     Skin cancer Maternal Grandfather     Coronary artery disease Family     Osteoporosis Family     Rheum arthritis Family     Breast cancer Cousin     Melanoma Cousin     Lung cancer Paternal Aunt     Throat cancer Brother     Diabetes type I Brother     Hypothyroidism Brother     No Known Problems Sister     No Known Problems Maternal Aunt     No Known Problems Maternal Uncle     No Known Problems Paternal Uncle     No Known Problems Maternal Grandmother     No Known Problems Paternal Grandmother     No Known Problems Paternal Grandfather     ADD / ADHD Neg Hx     Anesthesia problems Neg Hx     Clotting disorder Neg Hx     Collagen disease Neg Hx     Dislocations Neg Hx     Learning disabilities Neg Hx     Neurological problems Neg Hx     Rheumatologic disease Neg Hx     Scoliosis Neg Hx     Vascular Disease Neg Hx        Social History     Occupational History    Not on file   Tobacco Use    Smoking status: Never Smoker    Smokeless tobacco: Never Used   Substance and Sexual Activity    Alcohol use: Yes     Comment: moderate,social    Drug use: No    Sexual activity: Not on file         Current Outpatient Medications:     acetaminophen (TYLENOL) 325 mg tablet, Take 650 mg by mouth every 6 (six) hours as needed for mild pain, Disp: , Rfl:     ALPRAZolam (XANAX) 0 5 mg tablet, Take 1 tablet (0 5 mg total) by mouth 2 (two) times a day as needed for anxiety, Disp: 60 tablet, Rfl: 3    Calcium Carbonate (CALCIUM 600) 1500 (600 CA) MG TABS, Take by mouth every morning  , Disp: , Rfl:     Cholecalciferol (VITAMIN D3) 5000 units CAPS, Take by mouth every morning  , Disp: , Rfl:     Insulin Pen Needle (BD PEN NEEDLE WILLIAM U/F) 32G X 4 MM MISC, by Does not apply route daily (Patient not taking: Reported on 1/15/2020), Disp: 90 each, Rfl: 3    loratadine (CLARITIN) 10 mg tablet, Take 10 mg by mouth as needed , Disp: , Rfl:     meloxicam (MOBIC) 7 5 mg tablet, Take 1 tablet (7 5 mg total) by mouth daily, Disp: 30 tablet, Rfl: 2    SYNTHROID 112 MCG tablet, 112 mcg daily and  1 1/2 every other day, Disp: 108 tablet, Rfl: 1    zolpidem (AMBIEN CR) 12 5 MG CR tablet, Take 1 tablet (12 5 mg total) by mouth daily at bedtime as needed for sleep, Disp: 30 tablet, Rfl: 3    Allergies   Allergen Reactions    Other     Scopolamine      Reaction Date: 10Aug2011;     Sulfa Antibiotics Hives    Sulfamethoxazole-Trimethoprim      Reaction Date: 10Aug2011;        Objective:  Vitals:    03/12/20 0807   BP: 138/85   Pulse: 72       Body mass index is 25 53 kg/m²  Left Knee Exam     Muscle Strength   The patient has normal left knee strength  Tenderness   The patient is experiencing tenderness in the lateral joint line and patella  Range of Motion   Extension:  0 normal   Flexion:  130 normal     Tests   Varus: negative Valgus: negative  Drawer:  Anterior - negative     Posterior - negative    Other   Erythema: absent  Scars: absent  Sensation: normal  Pulse: present  Swelling: none  Effusion: effusion (trace) present    Comments:  Collateral ligaments stable at 0, 30 and 90°  Neurovascularly intact distally  No warmth, erythema or signs of infection  Parapatellar crepitance noted          Observations   Left Knee   Positive for effusion (trace)  Physical Exam   Constitutional: She is oriented to person, place, and time  She appears well-developed and well-nourished  Body mass index is 25 53 kg/m²  HENT:   Head: Normocephalic and atraumatic  Eyes: EOM are normal    Neck: Normal range of motion  Cardiovascular: Intact distal pulses  Pulmonary/Chest: Effort normal    Musculoskeletal:        Left knee: She exhibits effusion (trace)  See ortho exam   Neurological: She is alert and oriented to person, place, and time  Skin: Skin is warm and dry  Psychiatric: She has a normal mood and affect  Her behavior is normal  Judgment and thought content normal    Nursing note and vitals reviewed

## 2020-03-27 DIAGNOSIS — E03.9 HYPOTHYROIDISM, UNSPECIFIED TYPE: ICD-10-CM

## 2020-03-27 RX ORDER — LEVOTHYROXINE SODIUM 112 MCG
TABLET ORAL
Qty: 50 TABLET | Refills: 0 | Status: SHIPPED | OUTPATIENT
Start: 2020-03-27 | End: 2020-03-30 | Stop reason: SDUPTHER

## 2020-03-30 DIAGNOSIS — E03.9 HYPOTHYROIDISM, UNSPECIFIED TYPE: ICD-10-CM

## 2020-03-30 RX ORDER — LEVOTHYROXINE SODIUM 112 MCG
TABLET ORAL
Qty: 150 TABLET | Refills: 3 | Status: SHIPPED | OUTPATIENT
Start: 2020-03-30 | End: 2021-01-07 | Stop reason: SDUPTHER

## 2020-04-06 ENCOUNTER — TELEPHONE (OUTPATIENT)
Dept: FAMILY MEDICINE CLINIC | Facility: CLINIC | Age: 60
End: 2020-04-06

## 2020-04-06 PROBLEM — R50.9 FEVER, LOW GRADE: Status: ACTIVE | Noted: 2020-04-06

## 2020-04-06 PROBLEM — H92.01 RIGHT EAR PAIN: Status: ACTIVE | Noted: 2020-04-06

## 2020-06-26 ENCOUNTER — DOCUMENTATION (OUTPATIENT)
Dept: FAMILY MEDICINE CLINIC | Facility: CLINIC | Age: 60
End: 2020-06-26

## 2020-06-26 DIAGNOSIS — Z20.828 EXPOSURE TO SARS-ASSOCIATED CORONAVIRUS: Primary | ICD-10-CM

## 2020-06-26 DIAGNOSIS — Z20.828 EXPOSURE TO SARS-ASSOCIATED CORONAVIRUS: ICD-10-CM

## 2020-06-26 PROCEDURE — U0003 INFECTIOUS AGENT DETECTION BY NUCLEIC ACID (DNA OR RNA); SEVERE ACUTE RESPIRATORY SYNDROME CORONAVIRUS 2 (SARS-COV-2) (CORONAVIRUS DISEASE [COVID-19]), AMPLIFIED PROBE TECHNIQUE, MAKING USE OF HIGH THROUGHPUT TECHNOLOGIES AS DESCRIBED BY CMS-2020-01-R: HCPCS

## 2020-06-27 DIAGNOSIS — G47.09 OTHER INSOMNIA: ICD-10-CM

## 2020-06-27 DIAGNOSIS — F41.1 GENERALIZED ANXIETY DISORDER: ICD-10-CM

## 2020-06-28 LAB
INPATIENT: NORMAL
SARS-COV-2 RNA SPEC QL NAA+PROBE: NOT DETECTED

## 2020-06-29 ENCOUNTER — TELEPHONE (OUTPATIENT)
Dept: URGENT CARE | Facility: CLINIC | Age: 60
End: 2020-06-29

## 2020-06-29 RX ORDER — ALPRAZOLAM 0.5 MG/1
0.5 TABLET ORAL 2 TIMES DAILY PRN
Qty: 60 TABLET | Refills: 0 | Status: SHIPPED | OUTPATIENT
Start: 2020-06-29 | End: 2021-05-18

## 2020-06-29 RX ORDER — ZOLPIDEM TARTRATE 12.5 MG/1
12.5 TABLET, FILM COATED, EXTENDED RELEASE ORAL
Qty: 30 TABLET | Refills: 0 | Status: SHIPPED | OUTPATIENT
Start: 2020-06-29 | End: 2020-08-11

## 2020-08-10 DIAGNOSIS — G47.09 OTHER INSOMNIA: ICD-10-CM

## 2020-08-11 RX ORDER — ZOLPIDEM TARTRATE 12.5 MG/1
12.5 TABLET, FILM COATED, EXTENDED RELEASE ORAL
Qty: 30 TABLET | Refills: 0 | Status: SHIPPED | OUTPATIENT
Start: 2020-08-11 | End: 2020-09-15

## 2020-08-11 NOTE — TELEPHONE ENCOUNTER
Medication:Zolpidem  PDMP  06/29/2020  1  06/29/2020  ZOLPIDEM TART ER 12 5 MG TAB  30 0  30  TI SHE       Active agreement on file -No

## 2020-09-13 DIAGNOSIS — G47.09 OTHER INSOMNIA: ICD-10-CM

## 2020-09-15 RX ORDER — ZOLPIDEM TARTRATE 12.5 MG/1
12.5 TABLET, FILM COATED, EXTENDED RELEASE ORAL
Qty: 30 TABLET | Refills: 0 | Status: SHIPPED | OUTPATIENT
Start: 2020-09-15 | End: 2021-08-26 | Stop reason: SDUPTHER

## 2020-09-15 NOTE — TELEPHONE ENCOUNTER
08/11/2020  1  08/11/2020  ZOLPIDEM TART ER 12 5 MG TAB  30 0  30  TI [facility]  [phone]  [facility] ([id number])  0   Private Pay  PA

## 2021-01-07 DIAGNOSIS — E03.9 HYPOTHYROIDISM, UNSPECIFIED TYPE: ICD-10-CM

## 2021-01-07 NOTE — TELEPHONE ENCOUNTER
Patient is overdue for follow up  When she was last seen she was asked to come back in 3 months  Please call her to schedule a follow up appt before refills can be given  She is also due for BW  Thanks!     Last appt, 4/23/19    Next appt, none pending

## 2021-01-08 RX ORDER — LEVOTHYROXINE SODIUM 112 MCG
TABLET ORAL
Qty: 150 TABLET | Refills: 0 | Status: SHIPPED | OUTPATIENT
Start: 2021-01-08 | End: 2021-01-28 | Stop reason: SDUPTHER

## 2021-01-13 DIAGNOSIS — M80.80XA LOCALIZED OSTEOPOROSIS WITH CURRENT PATHOLOGICAL FRACTURE, INITIAL ENCOUNTER: Primary | ICD-10-CM

## 2021-01-28 ENCOUNTER — TELEMEDICINE (OUTPATIENT)
Dept: ENDOCRINOLOGY | Facility: CLINIC | Age: 61
End: 2021-01-28
Payer: COMMERCIAL

## 2021-01-28 DIAGNOSIS — Z90.3 POSTGASTRECTOMY MALABSORPTION: Primary | ICD-10-CM

## 2021-01-28 DIAGNOSIS — C73 THYROID CANCER (HCC): ICD-10-CM

## 2021-01-28 DIAGNOSIS — M81.8 OTHER OSTEOPOROSIS WITHOUT CURRENT PATHOLOGICAL FRACTURE: ICD-10-CM

## 2021-01-28 DIAGNOSIS — M80.80XA LOCALIZED OSTEOPOROSIS WITH CURRENT PATHOLOGICAL FRACTURE, INITIAL ENCOUNTER: ICD-10-CM

## 2021-01-28 DIAGNOSIS — E55.9 VITAMIN D DEFICIENCY: ICD-10-CM

## 2021-01-28 DIAGNOSIS — K91.2 POSTGASTRECTOMY MALABSORPTION: Primary | ICD-10-CM

## 2021-01-28 DIAGNOSIS — E89.0 POSTOPERATIVE HYPOTHYROIDISM: ICD-10-CM

## 2021-01-28 DIAGNOSIS — E03.9 HYPOTHYROIDISM, UNSPECIFIED TYPE: ICD-10-CM

## 2021-01-28 PROCEDURE — G2012 BRIEF CHECK IN BY MD/QHP: HCPCS | Performed by: INTERNAL MEDICINE

## 2021-01-28 RX ORDER — LEVOTHYROXINE SODIUM 112 MCG
TABLET ORAL
Qty: 90 TABLET | Refills: 3 | Status: SHIPPED | OUTPATIENT
Start: 2021-01-28 | End: 2021-03-25 | Stop reason: SDUPTHER

## 2021-01-28 NOTE — PROGRESS NOTES
Virtual Brief Visit    Assessment/Plan:    Problem List Items Addressed This Visit        Digestive    Postgastrectomy malabsorption - Primary       Endocrine    Postoperative hypothyroidism     Continue synthroid - will check thyroid function test - goal to keep tsh in low normal range given that she is at low risk of recurrence          Relevant Medications    Synthroid 112 MCG tablet    Other Relevant Orders    TSH, 3rd generation Lab Collect    T4, free Lab Collect    Thyroid cancer (HCC)     Will repeat TG/TGAB          Relevant Medications    Synthroid 112 MCG tablet    Other Relevant Orders    Thyroglobulin w/ab Lab Collect       Musculoskeletal and Integument    Other osteoporosis without current pathological fracture     Counseled on increased risk of fracture , continue calcium and vitamin D supplementations   Repeat Dexa scan and after that consider prolia             Other    Vitamin D deficiency    Relevant Orders    Vitamin D 25 hydroxy Lab Collect      Other Visit Diagnoses     Hypothyroidism, unspecified type        Relevant Medications    Synthroid 112 MCG tablet                Reason for visit is   Chief Complaint   Patient presents with    Virtual Brief Visit        Encounter provider Peggy Lance MD    Provider located at [address] [place] [id number]    Recent Visits  Date Type Provider Dept   01/28/21 Telemedicine Peggy Lance MD [facility]   Showing recent visits within past 7 days and meeting all other requirements     Future Appointments  No visits were found meeting these conditions  Showing future appointments within next 150 days and meeting all other requirements        After connecting through telephone, the patient was identified by name and date of birth   Dana Bonilla was informed that this is a telemedicine visit and that the visit is being conducted through telephone  My office door was closed  No one else was in the room  She acknowledged consent and understanding of privacy and security of the platform  The patient has agreed to participate and understands she can discontinue the visit at any time  Patient is aware this is a billable service  Subjective    Kierra Flores is a 62 y o  female  Last seen in 2018     HPI  She has a history of follicular variant papillary thyroid cancer with Oncocytic features  She underwent total completion thyroidectomy and radioactive iodine ablation in 2010  She underwent Thyrogen stimulated thyroglobulin level/Scan in 2014 which showed no evidence of recurrent/metastatic disease  Thyroid Antibodies have been mildly elevated/stable  Last Ultrasound 4/2018 showed no evidence of disease       Recently Started iron supplementation and feeling better as far as fatigue is concerned   Weight fairly stable        For post surgical hypothyroidism she is on Synthroid 112  1 tab alternating 1 1/2 tab     she has completed 18 months of the Forteo a few years back- She had 1 treatment with reclast in November 2013 ( prior to forteo )  and second treatment 3/23/16-- had terrible flu like symptoms  She would not tolerate/absorb oral bisphonates due to hx gastric bypass surgery  3-4  servings of calcium daily   Vitamin D3 5000 iu daily   Femoral neck fracture in 2018 - underwent surgical repair               Past Medical History:   Diagnosis Date    Anxiety     Cancer [facility])     thyroid    Closed rib fracture     x2    Disease of thyroid gland     cancer,had thyroidectomy    Fall     last assessed 5/14/16    Femoral neck stress fracture 10/10/2016    Iron deficiency anemia     Viral gastroenteritis     last assessed 11/29/12       Past Surgical History:   Procedure Laterality Date    APPENDECTOMY  2006    BREAST SURGERY Left 2001    CATARACT EXTRACTION Left     lens implant     SECTION      CHOLECYSTECTOMY      ESOPHAGOGASTRODUODENOSCOPY      diagnostic    GASTRIC BYPASS      HIP SURGERY Right     HYSTERECTOMY      total abdominal    JOINT REPLACEMENT      KNEE ARTHROSCOPY Bilateral     MI COLONOSCOPY FLX DX W/COLLJ SPEC WHEN PFRMD N/A 2017    Procedure: EGD AND COLONOSCOPY;  Surgeon: Cathy Pickering MD;  [facility]; Service: Gastroenterology    MI FEMORAL [address] Right 10/10/2016    Procedure: FEMORAL NECK FIXATION ;  Surgeon: Tg Marroquin MD;  [facility];  Service: Orthopedics    MI TREAT INTER/SUBTROCH FX,W/PLATE/SCREW Left     Procedure: OPEN REDUCTION W/ INTERNAL FIXATION (ORIF) HIP WITH PLATE AND SCREWS - Left hip dynamic hip screw;  Surgeon: Chris Bryant DO;  [facility];  Service: Orthopedics    THYROIDECTOMY Bilateral     with LN dissectomy       Current Outpatient Medications   Medication Sig Dispense Refill    acetaminophen (TYLENOL) 325 mg tablet Take 650 mg by mouth every 6 (six) hours as needed for mild pain      ALPRAZolam (XANAX) 0 5 mg tablet Take 1 tablet (0 5 mg total) by mouth 2 (two) times a day as needed for anxiety 60 tablet 0    Cholecalciferol (VITAMIN D3) 5000 units CAPS Take by mouth every morning        Synthroid 112 MCG tablet Take 1 tablet daily 90 tablet 3    zolpidem (AMBIEN CR) 12 5 MG CR tablet TAKE 1 TABLET (12 5 MG TOTAL) BY MOUTH DAILY AT BEDTIME AS NEEDED FOR SLEEP 30 tablet 0     No current facility-administered medications for this visit  Allergies   Allergen Reactions    Other     Scopolamine      Reaction Date: 2011;     Sulfa Antibiotics Hives    Sulfamethoxazole-Trimethoprim      Reaction Date: 2011;        Review of Systems   Constitutional: Positive for fatigue  Negative for unexpected weight change  Respiratory: Negative for cough  Cardiovascular: Negative for palpitations and leg swelling     Gastrointestinal: Negative for constipation, diarrhea, nausea and vomiting  Endocrine: Negative for polydipsia and polyuria  Musculoskeletal: Positive for arthralgias  Negative for gait problem and myalgias  Psychiatric/Behavioral: Positive for sleep disturbance  Negative for confusion  There were no vitals filed for this visit  I spent 18 minutes directly with the patient during this visit    VIRTUAL VISIT [address] acknowledges that she has consented to an online visit or consultation  She understands that the online visit is based solely on information provided by her, and that, in the absence of a face-to-face physical evaluation by the physician, the diagnosis she receives is both limited and provisional in terms of accuracy and completeness  This is not intended to replace a full medical face-to-face evaluation by the physician  Suzy Sierra understands and accepts these terms

## 2021-02-01 NOTE — ASSESSMENT & PLAN NOTE
Counseled on increased risk of fracture , continue calcium and vitamin D supplementations    Repeat Dexa scan and after that consider prolia

## 2021-02-01 NOTE — ASSESSMENT & PLAN NOTE
Continue synthroid - will check thyroid function test - goal to keep tsh in low normal range given that she is at low risk of recurrence

## 2021-03-01 ENCOUNTER — TELEPHONE (OUTPATIENT)
Dept: ENDOCRINOLOGY | Facility: CLINIC | Age: 61
End: 2021-03-01

## 2021-03-01 NOTE — TELEPHONE ENCOUNTER
----- Message from Cecilio Cortez MD sent at 3/1/2021  9:55 AM EST -----  Please call the patient regarding labs -continue synthroid at current dose , will discuss labs on upcoming visit

## 2021-03-19 ENCOUNTER — TELEPHONE (OUTPATIENT)
Dept: ENDOCRINOLOGY | Facility: CLINIC | Age: 61
End: 2021-03-19

## 2021-03-19 NOTE — TELEPHONE ENCOUNTER
----- Message from Josey Estrada sent at 3/19/2021  9:14 AM EDT -----  Regarding: FW: Visit Follow-Up Question  Contact: [phone]    ----- Message -----  From: Madi Askew  Sent: 3/19/2021   8:43 AM EDT  To: , #  Subject: Visit Follow-Up Question                         Dara Fuentes,   I'm scheduled for a follow up on April 23 at 2:30  Can this please be converted to a video visit? I will be out of town but I don't want to reschedule again   Thanks   Adeola

## 2021-03-25 DIAGNOSIS — M80.80XA LOCALIZED OSTEOPOROSIS WITH CURRENT PATHOLOGICAL FRACTURE, INITIAL ENCOUNTER: ICD-10-CM

## 2021-03-25 DIAGNOSIS — E03.9 HYPOTHYROIDISM, UNSPECIFIED TYPE: ICD-10-CM

## 2021-03-25 RX ORDER — LEVOTHYROXINE SODIUM 112 MCG
TABLET ORAL
Qty: 90 TABLET | Refills: 3 | Status: SHIPPED | OUTPATIENT
Start: 2021-03-25 | End: 2021-10-11 | Stop reason: SDUPTHER

## 2021-03-29 ENCOUNTER — TELEPHONE (OUTPATIENT)
Dept: ENDOCRINOLOGY | Facility: CLINIC | Age: 61
End: 2021-03-29

## 2021-03-29 DIAGNOSIS — M81.8 OTHER OSTEOPOROSIS WITHOUT CURRENT PATHOLOGICAL FRACTURE: Primary | ICD-10-CM

## 2021-03-29 NOTE — TELEPHONE ENCOUNTER
----- Message from Kierra Cutler PA-C sent at 3/29/2021  9:13 AM EDT -----  Dr Javier Miller would like to do some lab testing for bone turnover markers before visit if possible  to determine Plans for treatment of osteoporosis  I ordered Urine NTX, Serum CTX, P1NP, and bone specific alk phos    Thanks

## 2021-03-29 NOTE — TELEPHONE ENCOUNTER
----- Message from Deo Ruiz PA-C sent at 3/29/2021  9:13 AM EDT -----  Dr Ranjan Kate would like to do some lab testing for bone turnover markers before visit if possible  to determine Plans for treatment of osteoporosis  I ordered Urine NTX, Serum CTX, P1NP, and bone specific alk phos    Thanks

## 2021-04-23 ENCOUNTER — TELEMEDICINE (OUTPATIENT)
Dept: ENDOCRINOLOGY | Facility: CLINIC | Age: 61
End: 2021-04-23
Payer: COMMERCIAL

## 2021-04-23 DIAGNOSIS — E55.9 VITAMIN D DEFICIENCY: ICD-10-CM

## 2021-04-23 DIAGNOSIS — C73 THYROID CANCER (HCC): ICD-10-CM

## 2021-04-23 DIAGNOSIS — M81.8 OTHER OSTEOPOROSIS WITHOUT CURRENT PATHOLOGICAL FRACTURE: Primary | ICD-10-CM

## 2021-04-23 DIAGNOSIS — E89.0 POSTOPERATIVE HYPOTHYROIDISM: ICD-10-CM

## 2021-04-23 PROCEDURE — 99214 OFFICE O/P EST MOD 30 MIN: CPT | Performed by: PHYSICIAN ASSISTANT

## 2021-04-23 NOTE — PATIENT INSTRUCTIONS
Teriparatide (By injection)   Teriparatide (ter-i-PAR-a-tide)  Treats osteoporosis (weak or brittle bones) in men, and in women who have gone through menopause  Brand Name(s): Forteo   There may be other brand names for this medicine  When This Medicine Should Not Be Used: You should not use this medicine if you have had an allergic reaction to teriparatide  How to Use This Medicine:   Injectable  · Your doctor will prescribe your exact dose and tell you how often it should be given  This medicine is given as a shot under your skin  · This medicine should come with a Medication Guide  Ask your pharmacist for a copy if you do not have one  · You may be taught how to give your medicine at home  Make sure you understand all instructions before giving yourself an injection  Do not use more medicine or use it more often than your doctor tells you to  · You will be shown the body areas where this shot can be given  Use a different body area each time you give yourself a shot  Keep track of where you give each shot to make sure you rotate body areas  · This medicine comes in a special pre-filled pen  Be sure you understand how to use the pen to give yourself a shot  · Give yourself the shot in your thigh muscle or in your lower stomach area  Be sure to put the cap back on the pen after giving yourself a shot  Put the pen back in the refrigerator right after you are done giving yourself the shot  · You can use this medicine at any time of the day, however using it at the same time each day may help you remember to take your shot  · When you first start using this medicine, give yourself a shot while you are in an area where it is easy for you to sit or lay down right away if you become dizzy  · Do not use this medicine if it looks cloudy, colored, or if it has specks in it  The medicine inside the pen should be clear and colorless    · Do not use a pen for more than 28 days (4 weeks) after it has been opened, even if there is still some medicine in it  · You should not use this medicine for longer than 2 years  It is only part of a complete plan for treating osteoporosis  Ask your doctor about other things you can do to help yourself  This may include taking vitamin or mineral supplements, getting regular exercise, and not smoking  If a dose is missed:   · Take a dose as soon as you remember  If it is almost time for your next dose, wait until then and take a regular dose  Do not take extra medicine to make up for a missed dose  How to Store and Dispose of This Medicine:   · Store the medicine pen in the refrigerator  Take the pen out of the refrigerator only long enough to give yourself a shot  Then put it back in the refrigerator right away  Do not freeze the pen  Do not use this medicine if it has been frozen  · Ask your pharmacist, doctor, or health caregiver about the best way to dispose of any leftover medicine and the used pen  You will also need to throw away old medicine 28 days after the first time you use the pen, or after the expiration date has passed  · Keep all medicine out of the reach of children  Never share your medicine with anyone  Drugs and Foods to Avoid:   Ask your doctor or pharmacist before using any other medicine, including over-the-counter medicines, vitamins, and herbal products  · Make sure your doctor knows if you are also using digoxin (Lanoxin®)  Warnings While Using This Medicine:   · Make sure your doctor knows if you are pregnant or breastfeeding, or if you have heart disease, liver disease, kidney disease, kidney stones, or if you are on dialysis  · Make sure your doctor knows if you have hypercalcemia (high levels of calcium in your blood), or if you have a disease that may cause you to have hypercalcemia, such as hyperparathyroidism (overactive parathyroid gland)  · When this medicine was tested in rats, some of the rats developed bone cancer   It is not known if the same thing could happen in people  Your risk of bone cancer may be higher if you have Paget's disease or another bone disease, or if you are still growing  Your risk may also be higher if you have ever had bone cancer, or if you have had external beam or implant radiation treatment on your bones  Make sure your doctor knows if you have ever had any of these bone conditions or treatments  · This medicine may make you dizzy or drowsy  Avoid driving, using machines, or doing anything else that could be dangerous if you are not alert  · You are more likely to get dizzy, lightheaded, or have a pounding heartbeat when you first start using this medicine  Sit or lie down if this happens  Call your doctor if these side effects do not go away, or get worse  · Tell any doctor or dentist who treats you that you are using this medicine  This medicine may affect certain medical test results  · Your doctor will do lab tests at regular visits to check on the effects of this medicine  Keep all appointments  Possible Side Effects While Using This Medicine:   Call your doctor right away if you notice any of these side effects:  · Allergic reaction: Itching or hives, swelling in your face or hands, swelling or tingling in your mouth or throat, chest tightness, trouble breathing  · Chest pain, fast heartbeat  · Leg cramps, joint pain, or muscle spasms  · Lightheadedness or fainting  · Nausea, vomiting, or constipation  · Unusual tiredness or weakness  If you notice these less serious side effects, talk with your doctor:   · Diarrhea or upset stomach  · Headache or neck pain  · Redness, pain, swelling, itching, bruising, or bleeding where the shot was given  · Runny or stuffy nose, cough  · Skin rash  · Sweating  · Trouble sleeping  If you notice other side effects that you think are caused by this medicine, tell your doctor  Call your doctor for medical advice about side effects   You may report side effects to FDA at 1-800-FDA-1088  © Copyright [address] [place] [address] Information is for End User's use only and may not be sold, redistributed or otherwise used for commercial purposes  The above information is an  only  It is not intended as medical advice for individual conditions or treatments  Talk to your doctor, nurse or pharmacist before following any medical regimen to see if it is safe and effective for you

## 2021-04-23 NOTE — PROGRESS NOTES
Virtual Regular Visit      Assessment/Plan:    Problem List Items Addressed This Visit        Endocrine    Postoperative hypothyroidism     TSH has improved to low normal range  Continue synthroid 112mcg daily  Thyroid cancer ([address] )     Thyroglobulin low  Will monitor over time  Musculoskeletal and Integument    Other osteoporosis without current pathological fracture - Primary     DEXA Scan shows osteoporosis with T score -2 9 in the forearm  Bone turnover markers are normal   Would recommend anabolic therapy with Forteo, Tymlos, or Evenity to increase bone density and reduce risk of fracture  Forteo is no longer limited to 24 months of therapy  She would prefer to resume Forteo as she tolerated this medication in the past and ability to self inject at home  She is aware of risks/potential side effects of forteo  Discussed repeat DEXA Scan in  2 years to determine plan for continued therapy and consider transition to evenity or prolia at that time  Relevant Medications    Teriparatide, Recombinant, (Forteo) 600 MCG/2 4ML SOPN    Insulin Pen Needle (BD Pen Needle Sarah U/F) 32G X 4 MM MISC       Other    Vitamin D deficiency     Continue supplements  Reason for visit is   Chief Complaint   Patient presents with    Virtual Regular Visit        Encounter provider Josue Anderson PA-C    Provider located at [address] [place] [address] [address] [id number]      Recent Visits  Date Type Provider Dept   04/23/21 Telemedicine Josue Anderson PA-C [facility]   Showing recent visits within past 7 days and meeting all other requirements     Future Appointments  No visits were found meeting these conditions     Showing future appointments within next 150 days and meeting all other requirements        The patient was identified by name and date of birth  Dennis Vega was informed that this is a telemedicine visit and that the visit is being conducted through [employer] and patient was informed that this is a secure, HIPAA-compliant platform  She agrees to proceed     My office door was closed  Thee is nobody present in the room  She is currently out of state     She acknowledged consent and understanding of privacy and security of the video platform  The patient has agreed to participate and understands they can discontinue the visit at any time  Patient is aware this is a billable service  Subjective  Dennis Vega is a 62 y o  female with history of thyroid cancer, post-surgical hypothyroidism, and osteoporosis  For the osteoporosis, she is not taking any medication at this time  She has increased her dietary calcium intake and drinks 1/2 gallon of milk per week, eats greek yogurt and cottage cheese daily and has been eating dark leafy greens sipinach twice per day  She has been taking Vitamin D supplement 10,000 units daily  She denies any falls/fractures since the last visit  She has not been on any medication for osteoporosis from 2019 after she completed 24-months treatment with forteo (18 months in 2015/2016 and 6 months in 2018/2019)   At that time Fredis Alanis was being considered and she was referred to metabolic bone center at [address] [place] [address] but treatment/consulation not completed due to insurance issues at the time  She has history of femoral neck fracture in 2018- underwent surgical repair  Had very bad side effects from reclast   Oral bisphosphates were not used due to her hx of gastric bypass surgery  She has a history of follicular variant papillary thyroid cancer with Oncocytic features  She underwent total completion thyroidectomy and radioactive iodine ablation in 2010   She underwent Thyrogen stimulated thyroglobulin level/Scan in 2014 which showed no evidence of recurrent/metastatic disease  Thyroid Antibodies have been mildly elevated/stable  Ultrasound 2018 showed no evidence of disease  For Hypothyroidism, she is taking synthroid at a recently reduced dose of 112mcg daily  HPI     Past Medical History:   Diagnosis Date    Anxiety     Cancer [facility])     thyroid    Closed rib fracture     x2    Disease of thyroid gland     cancer,had thyroidectomy    Fall     last assessed 16    Femoral neck stress fracture 10/10/2016    Iron deficiency anemia     Viral gastroenteritis     last assessed 12       Past Surgical History:   Procedure Laterality Date    APPENDECTOMY  2006    BREAST SURGERY Left     CATARACT EXTRACTION Left     lens implant     SECTION      CHOLECYSTECTOMY      ESOPHAGOGASTRODUODENOSCOPY      diagnostic    GASTRIC BYPASS      HIP SURGERY Right     HYSTERECTOMY      total abdominal    JOINT REPLACEMENT      KNEE ARTHROSCOPY Bilateral     OR COLONOSCOPY FLX DX W/COLLJ SPEC WHEN PFRMD N/A 2017    Procedure: EGD AND COLONOSCOPY;  Surgeon: Dameon Nelson MD;  [facility];   Service: Gastroenterology    OR FEMORAL FX, OPEN [address] Right 10/10/2016    Procedure: FEMORAL NECK FIXATION ;  Surgeon: Ivett Cohen MD;  [facility];  Service: Orthopedics    OR TREAT INTER/SUBTROCH FX,W/PLATE/SCREW Left     Procedure: OPEN REDUCTION W/ INTERNAL FIXATION (ORIF) HIP WITH PLATE AND SCREWS - Left hip dynamic hip screw;  Surgeon: Nadine Desouza DO;  [facility];  Service: Orthopedics    THYROIDECTOMY Bilateral     with LN dissectomy       Current Outpatient Medications   Medication Sig Dispense Refill    acetaminophen (TYLENOL) 325 mg tablet Take 650 mg by mouth every 6 (six) hours as needed for mild pain      ALPRAZolam (XANAX) 0 5 mg tablet Take 1 tablet (0 5 mg total) by mouth 2 (two) times a day as needed for anxiety 60 tablet 0    Cholecalciferol (VITAMIN D3) 5000 units CAPS Take by mouth every morning        Insulin Pen Needle (BD Pen Needle Sarah U/F) 32G X 4 MM MISC Use 1 per day with forteo 100 each 1    Synthroid 112 MCG tablet Take 1 tablet daily 90 tablet 3    Teriparatide, Recombinant, (Forteo) 600 MCG/2 4ML SOPN Inject 20mcg daily 2 4 mL 5    zolpidem (AMBIEN CR) 12 5 MG CR tablet TAKE 1 TABLET (12 5 MG TOTAL) BY MOUTH DAILY AT BEDTIME AS NEEDED FOR SLEEP 30 tablet 0     No current facility-administered medications for this visit  Allergies   Allergen Reactions    Other     Scopolamine      Reaction Date: 10Aug2011;     Sulfa Antibiotics Hives    Sulfamethoxazole-Trimethoprim      Reaction Date: 10Aug2011;        Review of Systems   Constitutional: Negative for activity change, appetite change, chills, diaphoresis, fatigue, fever and unexpected weight change  HENT: Negative for trouble swallowing and voice change  Eyes: Negative for visual disturbance  Respiratory: Negative for shortness of breath  Cardiovascular: Negative for chest pain and palpitations  Gastrointestinal: Negative for abdominal pain, constipation and diarrhea  Endocrine: Negative for cold intolerance, heat intolerance, polydipsia, polyphagia and polyuria  Genitourinary: Negative for frequency and menstrual problem  Musculoskeletal: Negative for arthralgias and myalgias  Skin: Negative for rash  Allergic/Immunologic: Negative for food allergies  Neurological: Negative for dizziness and tremors  Hematological: Negative for adenopathy  Psychiatric/Behavioral: Negative for sleep disturbance  All other systems reviewed and are negative  Video Exam    There were no vitals filed for this visit  Physical Exam   Constitutional: She is oriented to person, place, and time  she appears well-developed and well-nourished  No distress  HENT:   Head: Normocephalic and atraumatic  Neck: Normal range of motion     Pulmonary/Chest: Effort normal    Musculoskeletal: Normal range of motion  Neurological: He is alert and oriented to person, place, and time  Skin: He is not diaphoretic  Psychiatric: Isabel Thao has a normal mood and affect  Her behavior is normal      Reviewed Labs   4/4/2021    Procollagen Type I Intact N-Terminal Propeptide   [facility] test code [id number]   Procollagen I Intact N-Terminal Propep:      50 ug/L                       Premenopausal:  20 - 101 ug/L   Postmenopausal: 16 - 96 ug/L  Alkaline Phosphatase, Bone Specific  ug/L 11 0     Urine NTX 28    2/25/2021 Thryoglobulin 0 4, TgAb <20  Vit D 54  TSH 1 61  Phos 3 7  PTH 7 1  CMP normal      I spent 15 minutes directly with the patient during this visit      VIRTUAL VISIT [address] acknowledges that she has consented to an online visit or consultation  She understands that the online visit is based solely on information provided by her, and that, in the absence of a face-to-face physical evaluation by the physician, the diagnosis she receives is both limited and provisional in terms of accuracy and completeness  This is not intended to replace a full medical face-to-face evaluation by the physician  Fatou Briones understands and accepts these terms

## 2021-04-26 RX ORDER — TERIPARATIDE 250 UG/ML
INJECTION, SOLUTION SUBCUTANEOUS
Qty: 2.4 ML | Refills: 5 | Status: SHIPPED | OUTPATIENT
Start: 2021-04-26 | End: 2021-09-20

## 2021-04-26 RX ORDER — PEN NEEDLE, DIABETIC 32GX 5/32"
NEEDLE, DISPOSABLE MISCELLANEOUS
Qty: 100 EACH | Refills: 1 | Status: SHIPPED | OUTPATIENT
Start: 2021-04-26 | End: 2021-09-20

## 2021-04-26 NOTE — ASSESSMENT & PLAN NOTE
DEXA Scan shows osteoporosis with T score -2 9 in the forearm  Bone turnover markers are normal   Would recommend anabolic therapy with Forteo, Tymlos, or Evenity to increase bone density and reduce risk of fracture  Forteo is no longer limited to 24 months of therapy  She would prefer to resume Forteo as she tolerated this medication in the past and ability to self inject at home  She is aware of risks/potential side effects of forteo  Discussed repeat DEXA Scan in  2 years to determine plan for continued therapy and consider transition to evenity or prolia at that time

## 2021-05-17 ENCOUNTER — TELEPHONE (OUTPATIENT)
Dept: ENDOCRINOLOGY | Facility: CLINIC | Age: 61
End: 2021-05-17

## 2021-05-17 NOTE — TELEPHONE ENCOUNTER
Spoke to patient and she has a tier 2 which will allow her to come here  She is aware that we are working on getting the auth for her evenity     Thank you

## 2021-05-17 NOTE — TELEPHONE ENCOUNTER
Called [facility] insurance PA department and spoke with Sinai Lopez  She stated that no PA is needed for Evenity injection  Call reference # [id number]  1:20 PM 05/17/2021    Called [facility] [employer] benefits department and spoke to Star  She stated that Yuly Donvoan is covered under buy and bill  She said patient has to meet her $400 deductible first and then she has to pay 50% of the medication cost which would be her coinsurance  Half of the drug cost would be $906 29  Call reference # [id number]    Called Chas from [address] [place] [address] to discuss the Evenity patient assistance program  Chas said they cover up to $8000 per calender year, so they will cover June to December this year and the pt will need to reapply next year  Pt can pay as little as $25 00 per month for the injection  Yuly Donovan Support Program Phone # [phone]    Will wait to get insurance verification before calling pt back

## 2021-05-17 NOTE — TELEPHONE ENCOUNTER
----- Message from Allie Ramos MD sent at 5/17/2021 11:52 AM EDT -----  Can we start the process of evenity for her ?

## 2021-05-17 NOTE — TELEPHONE ENCOUNTER
Can someone check on pt's insurance? I think she can only go to [facility]  Please see if she is still coming to our office or is transferring  She was last seen on 4/23/21 and has no pending appts       Thanks

## 2021-05-18 RX ORDER — MECLIZINE HYDROCHLORIDE 25 MG/1
25 TABLET ORAL 3 TIMES DAILY PRN
COMMUNITY
Start: 2021-04-02 | End: 2021-09-20

## 2021-05-18 NOTE — TELEPHONE ENCOUNTER
Received insurance verification  No PA required  OOP is 50% for Evenity and 50% for admin fee  Called and spoke to pt  She is aware of coverage and benefits  Provided her with phone # to call for Mik[employer]'s support plus program and explained to her how it works  She will call them to get the copay card and then call us back to schedule her 1st dose for Noreen

## 2021-05-18 NOTE — TELEPHONE ENCOUNTER
Patient called back with confirmation number for Evenity    Confirmation [id number]  Member I D  [id number]

## 2021-06-01 ENCOUNTER — CLINICAL SUPPORT (OUTPATIENT)
Dept: ENDOCRINOLOGY | Facility: CLINIC | Age: 61
End: 2021-06-01
Payer: COMMERCIAL

## 2021-06-01 DIAGNOSIS — M81.8 OTHER OSTEOPOROSIS WITHOUT CURRENT PATHOLOGICAL FRACTURE: ICD-10-CM

## 2021-06-01 DIAGNOSIS — M81.8 OTHER OSTEOPOROSIS WITHOUT CURRENT PATHOLOGICAL FRACTURE: Primary | ICD-10-CM

## 2021-06-01 PROCEDURE — 96372 THER/PROPH/DIAG INJ SC/IM: CPT

## 2021-07-02 ENCOUNTER — CLINICAL SUPPORT (OUTPATIENT)
Dept: ENDOCRINOLOGY | Facility: CLINIC | Age: 61
End: 2021-07-02
Payer: COMMERCIAL

## 2021-07-02 DIAGNOSIS — M81.8 OTHER OSTEOPOROSIS WITHOUT CURRENT PATHOLOGICAL FRACTURE: ICD-10-CM

## 2021-07-02 PROCEDURE — 96372 THER/PROPH/DIAG INJ SC/IM: CPT | Performed by: INTERNAL MEDICINE

## 2021-07-02 NOTE — PROGRESS NOTES
Pt is here for 2st dose of Evenity  Med supplied by our office  Consent form signed by the pt  Med administered in left/right arm  Pt tolerated procedure well

## 2021-08-03 ENCOUNTER — TELEPHONE (OUTPATIENT)
Dept: ENDOCRINOLOGY | Facility: CLINIC | Age: 61
End: 2021-08-03

## 2021-08-03 ENCOUNTER — CLINICAL SUPPORT (OUTPATIENT)
Dept: ENDOCRINOLOGY | Facility: CLINIC | Age: 61
End: 2021-08-03
Payer: COMMERCIAL

## 2021-08-03 DIAGNOSIS — M81.8 OTHER OSTEOPOROSIS WITHOUT CURRENT PATHOLOGICAL FRACTURE: ICD-10-CM

## 2021-08-03 PROCEDURE — 96372 THER/PROPH/DIAG INJ SC/IM: CPT | Performed by: INTERNAL MEDICINE

## 2021-08-25 ENCOUNTER — TELEPHONE (OUTPATIENT)
Dept: FAMILY MEDICINE CLINIC | Facility: CLINIC | Age: 61
End: 2021-08-25

## 2021-08-25 NOTE — TELEPHONE ENCOUNTER
Patient has not been seen in the office in over a year due to insurance reasons  She would like a prescription of Ambien      She is scheduled for an appt in October for medication refill    [facility] [address]

## 2021-08-26 DIAGNOSIS — G47.09 OTHER INSOMNIA: ICD-10-CM

## 2021-08-26 RX ORDER — ZOLPIDEM TARTRATE 12.5 MG/1
12.5 TABLET, FILM COATED, EXTENDED RELEASE ORAL
Qty: 30 TABLET | Refills: 0 | Status: SHIPPED | OUTPATIENT
Start: 2021-08-26 | End: 2021-10-11 | Stop reason: SDUPTHER

## 2021-09-03 ENCOUNTER — CLINICAL SUPPORT (OUTPATIENT)
Dept: ENDOCRINOLOGY | Facility: CLINIC | Age: 61
End: 2021-09-03
Payer: COMMERCIAL

## 2021-09-03 DIAGNOSIS — M81.8 OTHER OSTEOPOROSIS WITHOUT CURRENT PATHOLOGICAL FRACTURE: ICD-10-CM

## 2021-09-03 PROCEDURE — 96372 THER/PROPH/DIAG INJ SC/IM: CPT | Performed by: INTERNAL MEDICINE

## 2021-09-20 ENCOUNTER — TELEPHONE (OUTPATIENT)
Dept: ENDOCRINOLOGY | Facility: CLINIC | Age: 61
End: 2021-09-20

## 2021-09-20 NOTE — TELEPHONE ENCOUNTER
Pt's last Evenity injection was on 9/3/21  She is scheduled for her next one on 10/1/21  Please move her appt out to 10/4/21  She can only have 1 injection every 31 days or insurance will not pay for it      Thanks

## 2021-09-28 ENCOUNTER — RA CDI HCC (OUTPATIENT)
Dept: OTHER | Facility: HOSPITAL | Age: 61
End: 2021-09-28

## 2021-09-28 NOTE — PROGRESS NOTES
[facility]  coding opportunities       Chart reviewed, no opportunity found: CHART REVIEWED, NO OPPORTUNITY FOUND                        Patients insurance company:  [facility] [facility] (Medicare Advantage and Commercial)

## 2021-09-30 DIAGNOSIS — E03.9 HYPOTHYROIDISM, UNSPECIFIED TYPE: Primary | ICD-10-CM

## 2021-10-07 ENCOUNTER — OFFICE VISIT (OUTPATIENT)
Dept: FAMILY MEDICINE CLINIC | Facility: CLINIC | Age: 61
End: 2021-10-07
Payer: COMMERCIAL

## 2021-10-07 VITALS
BODY MASS INDEX: 25.85 KG/M2 | DIASTOLIC BLOOD PRESSURE: 90 MMHG | HEIGHT: 68 IN | RESPIRATION RATE: 14 BRPM | OXYGEN SATURATION: 99 % | SYSTOLIC BLOOD PRESSURE: 130 MMHG | WEIGHT: 170.6 LBS | TEMPERATURE: 97.3 F | HEART RATE: 63 BPM

## 2021-10-07 DIAGNOSIS — D12.6 ADENOMATOUS POLYP OF COLON, UNSPECIFIED PART OF COLON: ICD-10-CM

## 2021-10-07 DIAGNOSIS — Z85.850 HISTORY OF THYROID CANCER: ICD-10-CM

## 2021-10-07 DIAGNOSIS — E89.0 POSTOPERATIVE HYPOTHYROIDISM: ICD-10-CM

## 2021-10-07 DIAGNOSIS — G47.09 OTHER INSOMNIA: Primary | ICD-10-CM

## 2021-10-07 DIAGNOSIS — E55.9 VITAMIN D DEFICIENCY: ICD-10-CM

## 2021-10-07 PROBLEM — H92.01 RIGHT EAR PAIN: Status: RESOLVED | Noted: 2020-04-06 | Resolved: 2021-10-07

## 2021-10-07 PROBLEM — C73 THYROID CANCER (HCC): Status: RESOLVED | Noted: 2019-04-23 | Resolved: 2021-10-07

## 2021-10-07 PROBLEM — Z00.00 WELL ADULT EXAM: Status: RESOLVED | Noted: 2018-11-27 | Resolved: 2021-10-07

## 2021-10-07 PROBLEM — R50.9 FEVER, LOW GRADE: Status: RESOLVED | Noted: 2020-04-06 | Resolved: 2021-10-07

## 2021-10-07 PROCEDURE — 3008F BODY MASS INDEX DOCD: CPT | Performed by: FAMILY MEDICINE

## 2021-10-07 PROCEDURE — 99214 OFFICE O/P EST MOD 30 MIN: CPT | Performed by: FAMILY MEDICINE

## 2021-10-07 PROCEDURE — 1036F TOBACCO NON-USER: CPT | Performed by: FAMILY MEDICINE

## 2021-10-07 PROCEDURE — 3725F SCREEN DEPRESSION PERFORMED: CPT | Performed by: FAMILY MEDICINE

## 2021-10-08 DIAGNOSIS — E89.0 POSTOPERATIVE HYPOTHYROIDISM: Primary | ICD-10-CM

## 2021-10-11 DIAGNOSIS — G47.09 OTHER INSOMNIA: ICD-10-CM

## 2021-10-11 DIAGNOSIS — E03.9 HYPOTHYROIDISM, UNSPECIFIED TYPE: ICD-10-CM

## 2021-10-11 RX ORDER — LEVOTHYROXINE SODIUM 112 MCG
TABLET ORAL
Qty: 90 TABLET | Refills: 3
Start: 2021-10-11 | End: 2022-01-31

## 2021-10-12 ENCOUNTER — CLINICAL SUPPORT (OUTPATIENT)
Dept: ENDOCRINOLOGY | Facility: CLINIC | Age: 61
End: 2021-10-12
Payer: COMMERCIAL

## 2021-10-12 DIAGNOSIS — M81.8 OTHER OSTEOPOROSIS WITHOUT CURRENT PATHOLOGICAL FRACTURE: ICD-10-CM

## 2021-10-12 PROCEDURE — 96372 THER/PROPH/DIAG INJ SC/IM: CPT | Performed by: INTERNAL MEDICINE

## 2021-10-12 RX ORDER — ZOLPIDEM TARTRATE 12.5 MG/1
12.5 TABLET, FILM COATED, EXTENDED RELEASE ORAL
Qty: 30 TABLET | Refills: 0 | Status: SHIPPED | OUTPATIENT
Start: 2021-10-12 | End: 2021-11-19 | Stop reason: SDUPTHER

## 2021-11-16 ENCOUNTER — CLINICAL SUPPORT (OUTPATIENT)
Dept: ENDOCRINOLOGY | Facility: CLINIC | Age: 61
End: 2021-11-16
Payer: COMMERCIAL

## 2021-11-16 DIAGNOSIS — M81.8 OTHER OSTEOPOROSIS WITHOUT CURRENT PATHOLOGICAL FRACTURE: ICD-10-CM

## 2021-11-16 PROCEDURE — 96372 THER/PROPH/DIAG INJ SC/IM: CPT

## 2021-11-26 ENCOUNTER — TELEPHONE (OUTPATIENT)
Dept: ENDOCRINOLOGY | Facility: CLINIC | Age: 61
End: 2021-11-26

## 2021-12-02 DIAGNOSIS — G47.09 OTHER INSOMNIA: ICD-10-CM

## 2021-12-02 RX ORDER — ZOLPIDEM TARTRATE 12.5 MG/1
12.5 TABLET, FILM COATED, EXTENDED RELEASE ORAL
Qty: 30 TABLET | Refills: 5 | Status: SHIPPED | OUTPATIENT
Start: 2021-12-02 | End: 2022-06-06 | Stop reason: SDUPTHER

## 2021-12-14 ENCOUNTER — OFFICE VISIT (OUTPATIENT)
Dept: ENDOCRINOLOGY | Facility: CLINIC | Age: 61
End: 2021-12-14
Payer: COMMERCIAL

## 2021-12-14 VITALS
BODY MASS INDEX: 26.33 KG/M2 | SYSTOLIC BLOOD PRESSURE: 122 MMHG | HEIGHT: 68 IN | HEART RATE: 76 BPM | DIASTOLIC BLOOD PRESSURE: 84 MMHG

## 2021-12-14 DIAGNOSIS — M81.8 OTHER OSTEOPOROSIS WITHOUT CURRENT PATHOLOGICAL FRACTURE: Primary | ICD-10-CM

## 2021-12-14 DIAGNOSIS — E55.9 VITAMIN D DEFICIENCY: ICD-10-CM

## 2021-12-14 DIAGNOSIS — Z85.850 HISTORY OF THYROID CANCER: ICD-10-CM

## 2021-12-14 DIAGNOSIS — E89.0 POSTOPERATIVE HYPOTHYROIDISM: ICD-10-CM

## 2021-12-14 DIAGNOSIS — E03.9 HYPOTHYROIDISM, UNSPECIFIED TYPE: ICD-10-CM

## 2021-12-14 PROCEDURE — 99214 OFFICE O/P EST MOD 30 MIN: CPT | Performed by: PHYSICIAN ASSISTANT

## 2021-12-21 ENCOUNTER — CLINICAL SUPPORT (OUTPATIENT)
Dept: ENDOCRINOLOGY | Facility: CLINIC | Age: 61
End: 2021-12-21
Payer: COMMERCIAL

## 2021-12-21 ENCOUNTER — TELEPHONE (OUTPATIENT)
Dept: ADMINISTRATIVE | Facility: OTHER | Age: 61
End: 2021-12-21

## 2021-12-21 ENCOUNTER — OFFICE VISIT (OUTPATIENT)
Dept: FAMILY MEDICINE CLINIC | Facility: CLINIC | Age: 61
End: 2021-12-21
Payer: COMMERCIAL

## 2021-12-21 VITALS
WEIGHT: 173.8 LBS | BODY MASS INDEX: 26.34 KG/M2 | HEIGHT: 68 IN | TEMPERATURE: 97.8 F | DIASTOLIC BLOOD PRESSURE: 90 MMHG | HEART RATE: 76 BPM | RESPIRATION RATE: 14 BRPM | SYSTOLIC BLOOD PRESSURE: 144 MMHG | OXYGEN SATURATION: 100 %

## 2021-12-21 DIAGNOSIS — M81.8 OTHER OSTEOPOROSIS WITHOUT CURRENT PATHOLOGICAL FRACTURE: Primary | ICD-10-CM

## 2021-12-21 DIAGNOSIS — Z00.00 ANNUAL PHYSICAL EXAM: Primary | ICD-10-CM

## 2021-12-21 PROCEDURE — 3008F BODY MASS INDEX DOCD: CPT | Performed by: FAMILY MEDICINE

## 2021-12-21 PROCEDURE — 96372 THER/PROPH/DIAG INJ SC/IM: CPT | Performed by: INTERNAL MEDICINE

## 2021-12-21 PROCEDURE — 99211 OFF/OP EST MAY X REQ PHY/QHP: CPT | Performed by: INTERNAL MEDICINE

## 2021-12-21 PROCEDURE — 99396 PREV VISIT EST AGE 40-64: CPT | Performed by: FAMILY MEDICINE

## 2021-12-21 PROCEDURE — 1036F TOBACCO NON-USER: CPT | Performed by: FAMILY MEDICINE

## 2021-12-21 NOTE — TELEPHONE ENCOUNTER
----- Message from El Carr sent at 12/20/2021  4:18 PM EST -----  Regarding: Mammogram  12/20/21 4:18 PM    Hello, our patient Elba Snellen has had Mammogram completed/performed  Please assist in updating the patient chart by Care Every Where under other results The date of service is 10/29/2021       Thank you,  [facility]

## 2022-01-06 NOTE — TELEPHONE ENCOUNTER
Upon review of the In Basket request we were able to locate, review, and update the patient chart as requested for Mammogram     Any additional questions or concerns should be emailed to the Practice Liaisons via [email or web address] email, please do not reply via In Basket      Thank you  Martha Castillo

## 2022-01-28 ENCOUNTER — CLINICAL SUPPORT (OUTPATIENT)
Dept: ENDOCRINOLOGY | Facility: CLINIC | Age: 62
End: 2022-01-28
Payer: COMMERCIAL

## 2022-01-28 DIAGNOSIS — M81.8 OTHER OSTEOPOROSIS WITHOUT CURRENT PATHOLOGICAL FRACTURE: ICD-10-CM

## 2022-01-28 PROCEDURE — 99211 OFF/OP EST MAY X REQ PHY/QHP: CPT | Performed by: INTERNAL MEDICINE

## 2022-01-28 PROCEDURE — 96372 THER/PROPH/DIAG INJ SC/IM: CPT | Performed by: INTERNAL MEDICINE

## 2022-01-31 DIAGNOSIS — E03.9 HYPOTHYROIDISM, UNSPECIFIED TYPE: Primary | ICD-10-CM

## 2022-01-31 RX ORDER — LEVOTHYROXINE SODIUM 125 UG/1
125 TABLET ORAL DAILY
Qty: 90 TABLET | Refills: 1 | Status: SHIPPED | OUTPATIENT
Start: 2022-01-31 | End: 2022-03-29 | Stop reason: SDUPTHER

## 2022-01-31 NOTE — PROGRESS NOTES
Kiel Mir,    I think that is too large of a dose increase for a slightly high TSH, especially with your history of osteoporosis  We could go to 125mcg daily and repeat labs in 6 weeks  In the meantime, you can take 1 extra 112mcg tab per week until you get the new dose  I will send RX to [facility] and place orders for repeat labs  ThanksThuy     ===View-only below this line===      ----- Message -----       From:Shala Salgado       Sent:1/29/2022  6:50 AM EST         To:Thuy Gonzáles PA-C    Subject:Synthroid 112 mcg    Kiel Ruelas Qugrace my blood work done and Im concerned that my TSH bumped up to 4   I have taken 112 daily without any other medications  Its really frustrating to say the least  Im going to go back to my original dosing off 112 mcg and 168 mcg on alternate days  It seems to have been the best maintenance for me  I may need a new rx for the Synthroid sent to [place] [id number]   Any words of advice will be appreciated!    Thanks   Adeola

## 2022-03-01 PROBLEM — G89.29 CHRONIC PAIN OF LEFT KNEE: Status: RESOLVED | Noted: 2018-09-11 | Resolved: 2022-03-01

## 2022-03-01 PROBLEM — M25.562 CHRONIC PAIN OF LEFT KNEE: Status: RESOLVED | Noted: 2018-09-11 | Resolved: 2022-03-01

## 2022-03-01 PROBLEM — M41.9 LUMBAR SCOLIOSIS: Status: RESOLVED | Noted: 2017-12-07 | Resolved: 2022-03-01

## 2022-03-01 PROBLEM — M25.552 PAIN IN LEFT HIP: Status: RESOLVED | Noted: 2018-11-09 | Resolved: 2022-03-01

## 2022-03-01 PROBLEM — Z00.00 ANNUAL PHYSICAL EXAM: Status: RESOLVED | Noted: 2021-12-21 | Resolved: 2022-03-01

## 2022-03-01 PROBLEM — M17.11 PRIMARY OSTEOARTHRITIS OF RIGHT KNEE: Status: RESOLVED | Noted: 2018-02-06 | Resolved: 2022-03-01

## 2022-03-02 ENCOUNTER — CLINICAL SUPPORT (OUTPATIENT)
Dept: ENDOCRINOLOGY | Facility: CLINIC | Age: 62
End: 2022-03-02
Payer: COMMERCIAL

## 2022-03-02 DIAGNOSIS — M81.8 OTHER OSTEOPOROSIS WITHOUT CURRENT PATHOLOGICAL FRACTURE: ICD-10-CM

## 2022-03-02 PROCEDURE — 96372 THER/PROPH/DIAG INJ SC/IM: CPT | Performed by: INTERNAL MEDICINE

## 2022-03-29 ENCOUNTER — TELEPHONE (OUTPATIENT)
Dept: ENDOCRINOLOGY | Facility: CLINIC | Age: 62
End: 2022-03-29

## 2022-03-29 DIAGNOSIS — E03.9 HYPOTHYROIDISM, UNSPECIFIED TYPE: Primary | ICD-10-CM

## 2022-03-29 RX ORDER — LEVOTHYROXINE SODIUM 150 UG/1
150 TABLET ORAL DAILY
Qty: 90 TABLET | Refills: 1 | Status: SHIPPED | OUTPATIENT
Start: 2022-03-29 | End: 2022-05-18 | Stop reason: SDUPTHER

## 2022-03-29 NOTE — TELEPHONE ENCOUNTER
----- Message from Norma Putnam sent at 3/29/2022  7:13 AM EDT -----  Regarding: FW: TSH    ----- Message -----  From: Parish Mas  Sent: 3/28/2022   8:28 PM EDT  To: , #  Subject: TSH                                              Hi ZakiyaAlishae,  Got my TSH back  It has now increased to 7 68  It went up again despite the dosage being changed to 125mcg  I am not taking iron until dinner  I have not changed anything  I still feel awful  Tired  7 pound weight gain  Im really frustrated after so many years being pretty stable and feeling great with TSH around 1  I need some input

## 2022-04-08 ENCOUNTER — CLINICAL SUPPORT (OUTPATIENT)
Dept: ENDOCRINOLOGY | Facility: CLINIC | Age: 62
End: 2022-04-08
Payer: COMMERCIAL

## 2022-04-08 ENCOUNTER — TELEPHONE (OUTPATIENT)
Dept: ENDOCRINOLOGY | Facility: CLINIC | Age: 62
End: 2022-04-08

## 2022-04-08 DIAGNOSIS — M81.8 OTHER OSTEOPOROSIS WITHOUT CURRENT PATHOLOGICAL FRACTURE: Primary | ICD-10-CM

## 2022-04-08 PROCEDURE — 99211 OFF/OP EST MAY X REQ PHY/QHP: CPT | Performed by: INTERNAL MEDICINE

## 2022-04-08 NOTE — TELEPHONE ENCOUNTER
Pt at [facility] today for Evenity injection at time of check out pt states she has appointment with Dr Florinda Watt in  on 5/18/22 and would like to get injection on that day (pt stated she was told by someone  injections are only given on certain days at  but she doesn't want to make another trip due to schedule)

## 2022-04-08 NOTE — TELEPHONE ENCOUNTER
Spoke with patient and I did let her know that evenity injection can be given same day as office visit  Please add her to nurse visit

## 2022-04-18 ENCOUNTER — TELEPHONE (OUTPATIENT)
Dept: ENDOCRINOLOGY | Facility: CLINIC | Age: 62
End: 2022-04-18

## 2022-04-18 NOTE — TELEPHONE ENCOUNTER
Called [facility]GEL[facility] Lezama to see if PA is required for the new year  Spoke to Leonidas Diaz  She stated there have been some changes to the insurance w/ the new year and PA is now required for out-of-network requests  She emailed over PA request form for completion  Evenity PA request form completed and faxed along w/ clinicals to Teja @ [phone]

## 2022-04-21 NOTE — TELEPHONE ENCOUNTER
Noted  Insurance verification from [address] [place] [address] was wrong   PA is required and OOP cost is 50%, but pt uses copay card to cover cost

## 2022-05-18 ENCOUNTER — OFFICE VISIT (OUTPATIENT)
Dept: ENDOCRINOLOGY | Facility: CLINIC | Age: 62
End: 2022-05-18
Payer: COMMERCIAL

## 2022-05-18 VITALS
SYSTOLIC BLOOD PRESSURE: 132 MMHG | BODY MASS INDEX: 26.31 KG/M2 | HEIGHT: 68 IN | DIASTOLIC BLOOD PRESSURE: 90 MMHG | HEART RATE: 82 BPM | WEIGHT: 173.6 LBS

## 2022-05-18 DIAGNOSIS — M81.8 OTHER OSTEOPOROSIS WITHOUT CURRENT PATHOLOGICAL FRACTURE: Primary | ICD-10-CM

## 2022-05-18 DIAGNOSIS — E03.9 HYPOTHYROIDISM, UNSPECIFIED TYPE: ICD-10-CM

## 2022-05-18 DIAGNOSIS — K91.2 POSTGASTRECTOMY MALABSORPTION: ICD-10-CM

## 2022-05-18 DIAGNOSIS — Z90.3 POSTGASTRECTOMY MALABSORPTION: ICD-10-CM

## 2022-05-18 DIAGNOSIS — E89.0 POSTOPERATIVE HYPOTHYROIDISM: ICD-10-CM

## 2022-05-18 PROCEDURE — 3008F BODY MASS INDEX DOCD: CPT | Performed by: INTERNAL MEDICINE

## 2022-05-18 PROCEDURE — 96372 THER/PROPH/DIAG INJ SC/IM: CPT | Performed by: INTERNAL MEDICINE

## 2022-05-18 PROCEDURE — 99214 OFFICE O/P EST MOD 30 MIN: CPT | Performed by: INTERNAL MEDICINE

## 2022-05-18 RX ORDER — LEVOTHYROXINE SODIUM 150 MCG
TABLET ORAL
Qty: 90 TABLET | Refills: 1 | Status: SHIPPED | OUTPATIENT
Start: 2022-05-18

## 2022-05-18 NOTE — PROGRESS NOTES
Willian Santoro 65 y o  female MRN: [id number]    Encounter: [id number]      Assessment/Plan     Problem List Items Addressed This Visit        Digestive    Postgastrectomy malabsorption - Primary    Relevant Orders    DXA bone density spine hip and pelvis       Endocrine    Postoperative hypothyroidism     Continue Synthroid at current dose           Relevant Medications    Synthroid 150 MCG tablet    Other Relevant Orders    T4, free Lab Collect    TSH, 3rd generation Lab Collect    Thyroglobulin w/ab Lab Collect    DXA bone density spine hip and pelvis       Musculoskeletal and Integument    Other osteoporosis without current pathological fracture     Discussed the importance of starting anti resorptive therapy after finishing her course of Evenity  She is agreeable to starting Reclast infusion  Take tylenol before reclast infusion   Double up on calcium the day prior and the week of infusion  Relevant Orders    Basic metabolic panel Lab Collect    Phosphorus Lab Collect    Albumin Lab Collect    DXA bone density spine hip and pelvis      Other Visit Diagnoses     Hypothyroidism, unspecified type        Relevant Medications    Synthroid 150 MCG tablet        CC:   hypothyroidism     History of Present Illness     HPI:  25-year-old female with history of thyroid cancer, postsurgical hypothyroidism, post bariatric surgery malabsorption and osteoporosis seen in follow-up  For osteoporosis she is currently on evenity - received her  11 th dose   Prior to that she was not on any medications for osteoporosis since 2019-before that she was on Forteo    She is history of femoral neck fracture in 2018 and underwent surgical repair    Calcium - 3-4 servings of dairy   Vitamin D 3 10,000 iu every other day   No falls/ facrures since last visit     For hypothyroidism she is currently on Synthroid 150 mcg daily and has been taking it regularly and properly         Review of Systems    Historical Information Past Medical History:   Diagnosis Date    Anxiety     Cancer [facility])     thyroid    Closed rib fracture     x2    Disease of thyroid gland     cancer,had thyroidectomy    Fall     last assessed 16    Femoral neck stress fracture 10/10/2016    Iron deficiency anemia     Thyroid cancer ([facility] [address] ) 2019    Viral gastroenteritis     last assessed 12     Past Surgical History:   Procedure Laterality Date    APPENDECTOMY  2006    BREAST SURGERY Left 2001    CATARACT EXTRACTION Left     lens implant     SECTION      CHOLECYSTECTOMY      ESOPHAGOGASTRODUODENOSCOPY      diagnostic    GASTRIC BYPASS      HIP SURGERY Right     HYSTERECTOMY      total abdominal    JOINT REPLACEMENT      KNEE ARTHROSCOPY Bilateral     TN COLONOSCOPY FLX DX W/COLLJ SPEC WHEN PFRMD N/A 2017    Procedure: EGD AND COLONOSCOPY;  Surgeon: Fabienne Lamas MD;  [facility];   Service: Gastroenterology    TN FEMORAL FX, OPEN TX Right 10/10/2016    Procedure: FEMORAL NECK FIXATION ;  Surgeon: Cirilo Harvey MD;  [facility];  Service: Orthopedics    TN TREAT INTER/SUBTROCH FX,W/PLATE/SCREW Left     Procedure: OPEN REDUCTION W/ INTERNAL FIXATION (ORIF) HIP WITH PLATE AND SCREWS - Left hip dynamic hip screw;  Surgeon: Maryan Buchanan DO;  Location: [facility];  Service: Orthopedics    THYROIDECTOMY Bilateral     with LN dissectomy     Social History   Social History     Substance and Sexual Activity   Alcohol Use Yes    Comment: moderate,social     Social History     Substance and Sexual Activity   Drug Use No     Social History     Tobacco Use   Smoking Status Never Smoker   Smokeless Tobacco Never Used     Family History:   Family History   Problem Relation Age of Onset    Heart disease Mother         cardiac disorder    Diabetes Mother     Diabetes type I Mother     Cancer Father         of mouth    Bone cancer Maternal Grandfather     Brain cancer Maternal Grandfather  Lung cancer Maternal Grandfather     Skin cancer Maternal Grandfather     Coronary artery disease Family     Osteoporosis Family     Rheum arthritis Family     Breast cancer Cousin     Melanoma Cousin     Lung cancer Paternal Aunt     Throat cancer Brother     Diabetes type I Brother     Hypothyroidism Brother     No Known Problems Sister     No Known Problems Maternal Aunt     No Known Problems Maternal Uncle     No Known Problems Paternal Uncle     No Known Problems Maternal Grandmother     No Known Problems Paternal Grandmother     No Known Problems Paternal Grandfather     ADD / ADHD Neg Hx     Anesthesia problems Neg Hx     Clotting disorder Neg Hx     Collagen disease Neg Hx     Dislocations Neg Hx     Learning disabilities Neg Hx     Neurological problems Neg Hx     Rheumatologic disease Neg Hx     Scoliosis Neg Hx     Vascular Disease Neg Hx        Meds/Allergies   Current Outpatient Medications   Medication Sig Dispense Refill    acetaminophen (TYLENOL) 325 mg tablet Take 650 mg by mouth every 6 (six) hours as needed for mild pain      Cholecalciferol (VITAMIN D3) 5000 units CAPS Take by mouth every morning        Ferrous Gluconate 256 (28 Fe) MG TABS Take 65 mg by mouth daily with breakfast      Synthroid 150 MCG tablet Take 150 mcg mon-Friday and 125 on sat and Sunday 90 tablet 1    zolpidem (AMBIEN CR) 12 5 MG CR tablet Take 1 tablet (12 5 mg total) by mouth daily at bedtime as needed for sleep 30 tablet 5     Current Facility-Administered Medications   Medication Dose Route Frequency Provider Last Rate Last Admin    romosozumab-aqqg (EVENITY) subcutaneous injection 210 mg  210 mg Subcutaneous (multi inj) Once Javidnader Leon MD         Allergies   Allergen Reactions    Scopolamine Other (See Comments)     Reaction Date: 10Aug2011; hypotension    Sulfamethoxazole-Trimethoprim      Reaction Date: 10Aug2011;     Sulfa Antibiotics Hives       Objective   Vitals: Blood pressure 132/90, pulse 82, height 5' 7 5" (1 715 m), weight 78 7 kg (173 lb 9 6 oz)  Physical Exam  Vitals reviewed  Constitutional:       Appearance: Normal appearance  She is not ill-appearing or diaphoretic  HENT:      Head: Normocephalic and atraumatic  Eyes:      General: No scleral icterus  Extraocular Movements: Extraocular movements intact  Cardiovascular:      Rate and Rhythm: Normal rate and regular rhythm  Heart sounds: Normal heart sounds  No murmur heard  Pulmonary:      Effort: Pulmonary effort is normal  No respiratory distress  Breath sounds: Normal breath sounds  No wheezing  Abdominal:      General: There is no distension  Palpations: Abdomen is soft  Tenderness: There is no abdominal tenderness  There is no guarding  Musculoskeletal:      Cervical back: Neck supple  Right lower leg: No edema  Left lower leg: No edema  Lymphadenopathy:      Cervical: No cervical adenopathy  Skin:     General: Skin is warm and dry  Neurological:      General: No focal deficit present  Mental Status: She is alert and oriented to person, place, and time  Psychiatric:         Mood and Affect: Mood normal          Behavior: Behavior normal          Thought Content: Thought content normal          Judgment: Judgment normal          The history was obtained from the review of the chart, patient  Lab Results:          Imaging Studies:         Results for orders placed in visit on 03/25/21    DXA bone density spine hip and pelvis       Impression:   The examination is reviewed using the World Health Organization criteria  The lumbar spine has bone mineral density that is in the normal range  The forearm has bone mineral density of osteoporosis with measurements that show   high risk for fracture  Workstation:[id number]    Narrative    A DXA bone mineral density examination was performed with a [employer] [facility] scanner   The   patient is a 72-year-old postmenopausal female with a history of Pathological   fracture due to osteoporosis with routine healing, unspecified fracture site,   unspecified osteoporosis type, subsequent encounter [M80 00XD (ICD-10-CM)], with   surgical hardware in the bilateral femurs and hypothyroidism  The lumbar spine was examined from L1-4  In total, the bone mineral density is   0 6 standard deviations above the predicted peak bone mass and falls within the   normal range  The lumbar spine measurement is 1 109 g/cm2  The bilateral hips were not evaluated due to presence of surgical hardware  The left forearm was examined   The one-third radius has bone mineral density   that is 2 9 standard deviations below the predicted peak bone mass and is 75% of   normal           I have personally reviewed pertinent reports  Portions of the record may have been created with voice recognition software  Occasional wrong word or "sound a like" substitutions may have occurred due to the inherent limitations of voice recognition software  Read the chart carefully and recognize, using context, where substitutions have occurred

## 2022-05-23 DIAGNOSIS — M81.8 OTHER OSTEOPOROSIS WITHOUT CURRENT PATHOLOGICAL FRACTURE: Primary | ICD-10-CM

## 2022-05-27 ENCOUNTER — TELEPHONE (OUTPATIENT)
Dept: ENDOCRINOLOGY | Facility: CLINIC | Age: 62
End: 2022-05-27

## 2022-05-27 NOTE — TELEPHONE ENCOUNTER
I did call [facility] scheduling [phone] and spoke with Kamila Beckett, patient has been schedule on 07/25 at 1:30pm and they also want me to fax them order to [phone]

## 2022-05-27 NOTE — TELEPHONE ENCOUNTER
----- Message from Isha Hernandez MD sent at 5/18/2022  2:42 PM EDT -----  Need to order reclast for [facility] of [facility] - they have infusion center there     Please check how it can be done

## 2022-06-23 ENCOUNTER — CLINICAL SUPPORT (OUTPATIENT)
Dept: ENDOCRINOLOGY | Facility: CLINIC | Age: 62
End: 2022-06-23
Payer: COMMERCIAL

## 2022-06-23 DIAGNOSIS — M81.8 OTHER OSTEOPOROSIS WITHOUT CURRENT PATHOLOGICAL FRACTURE: Primary | ICD-10-CM

## 2022-06-23 PROCEDURE — 96372 THER/PROPH/DIAG INJ SC/IM: CPT | Performed by: PHYSICIAN ASSISTANT

## 2022-06-23 NOTE — PROGRESS NOTES
Pt signed consent  Left & Right arms cleansed with alcohol and 2x2  Administered Evenity in bilateral arms   Pt tolerated well

## 2022-06-29 ENCOUNTER — TELEPHONE (OUTPATIENT)
Dept: ENDOCRINOLOGY | Facility: CLINIC | Age: 62
End: 2022-06-29

## 2022-06-29 NOTE — TELEPHONE ENCOUNTER
----- Message from Jimena Shea MD sent at 6/28/2022  2:42 PM EDT -----  TG not done, please check with lab    ----- Message -----  From: Interface, Transcription Incoming  Sent: 6/28/2022  11:49 AM EDT  To: Jimena Shea MD

## 2022-06-29 NOTE — TELEPHONE ENCOUNTER
How Severe Is Your Skin Lesion?: mild TG has been added Have Your Skin Lesions Been Treated?: not been treated Is This A New Presentation, Or A Follow-Up?: Skin Lesions

## 2022-07-12 NOTE — ASSESSMENT & PLAN NOTE
Discussed the importance of starting anti resorptive therapy after finishing her course of Evenity  She is agreeable to starting Reclast infusion  Take tylenol before reclast infusion   Double up on calcium the day prior and the week of infusion

## 2022-07-25 ENCOUNTER — TELEPHONE (OUTPATIENT)
Dept: ENDOCRINOLOGY | Facility: CLINIC | Age: 62
End: 2022-07-25

## 2022-07-25 DIAGNOSIS — M81.8 OTHER OSTEOPOROSIS WITHOUT CURRENT PATHOLOGICAL FRACTURE: Primary | ICD-10-CM

## 2022-07-25 RX ORDER — ZOLEDRONIC ACID 5 MG/100ML
5 INJECTION, SOLUTION INTRAVENOUS ONCE
Qty: 100 ML | Refills: 0 | Status: CANCELLED | OUTPATIENT
Start: 2022-07-25 | End: 2022-07-25

## 2022-07-25 NOTE — TELEPHONE ENCOUNTER
I faxed over reclast orders to [facility] [phone]

## 2022-09-12 DIAGNOSIS — E03.9 HYPOTHYROIDISM, UNSPECIFIED TYPE: Primary | ICD-10-CM

## 2022-09-12 DIAGNOSIS — C73 THYROID CANCER (HCC): ICD-10-CM

## 2022-09-12 RX ORDER — LEVOTHYROXINE SODIUM 125 MCG
125 TABLET ORAL DAILY
Qty: 32 TABLET | Refills: 1 | Status: SHIPPED | OUTPATIENT
Start: 2022-09-12 | End: 2022-09-20 | Stop reason: SDUPTHER

## 2022-09-20 DIAGNOSIS — C73 THYROID CANCER (HCC): ICD-10-CM

## 2022-09-20 RX ORDER — LEVOTHYROXINE SODIUM 125 MCG
TABLET ORAL
Qty: 32 TABLET | Refills: 1 | Status: SHIPPED | OUTPATIENT
Start: 2022-09-20

## 2022-11-10 DIAGNOSIS — M81.8 OTHER OSTEOPOROSIS WITHOUT CURRENT PATHOLOGICAL FRACTURE: ICD-10-CM

## 2022-11-10 DIAGNOSIS — K91.2 POSTGASTRECTOMY MALABSORPTION: ICD-10-CM

## 2022-11-10 DIAGNOSIS — Z90.3 POSTGASTRECTOMY MALABSORPTION: ICD-10-CM

## 2022-11-10 DIAGNOSIS — E89.0 POSTOPERATIVE HYPOTHYROIDISM: ICD-10-CM

## 2022-12-15 ENCOUNTER — RA CDI HCC (OUTPATIENT)
Dept: OTHER | Facility: HOSPITAL | Age: 62
End: 2022-12-15

## 2022-12-19 ENCOUNTER — TELEMEDICINE (OUTPATIENT)
Dept: ENDOCRINOLOGY | Facility: CLINIC | Age: 62
End: 2022-12-19

## 2022-12-19 ENCOUNTER — TELEPHONE (OUTPATIENT)
Dept: ENDOCRINOLOGY | Facility: CLINIC | Age: 62
End: 2022-12-19

## 2022-12-19 DIAGNOSIS — E55.9 VITAMIN D DEFICIENCY: ICD-10-CM

## 2022-12-19 DIAGNOSIS — M81.8 OTHER OSTEOPOROSIS WITHOUT CURRENT PATHOLOGICAL FRACTURE: ICD-10-CM

## 2022-12-19 DIAGNOSIS — K91.2 POSTGASTRECTOMY MALABSORPTION: ICD-10-CM

## 2022-12-19 DIAGNOSIS — Z85.850 HISTORY OF THYROID CANCER: ICD-10-CM

## 2022-12-19 DIAGNOSIS — E89.0 POSTOPERATIVE HYPOTHYROIDISM: Primary | ICD-10-CM

## 2022-12-19 DIAGNOSIS — Z90.3 POSTGASTRECTOMY MALABSORPTION: ICD-10-CM

## 2022-12-19 RX ORDER — LEVOTHYROXINE SODIUM 0.12 MG/1
TABLET ORAL
COMMUNITY
Start: 2022-02-07 | End: 2022-12-19

## 2022-12-19 NOTE — ASSESSMENT & PLAN NOTE
Continue dietary calcium, vitamin D supplementation and fall prevention  Should be due for next infusion of retest next year    We will check 24-hour urine calcium to make sure she is getting adequate calcium intake

## 2022-12-19 NOTE — PROGRESS NOTES
Virtual Regular Visit    Verification of patient location:    Patient is located in the following state in which I hold an active license [place]      Assessment/Plan:    Problem List Items Addressed This Visit        Digestive    Postgastrectomy malabsorption    Relevant Orders    Calcium, urine, 24 hour Lab Collect       Endocrine    Postoperative hypothyroidism - Primary     Continue Synthroid at current dose         Relevant Orders    T4, free Lab Collect    TSH, 3rd generation Lab Collect    Comprehensive metabolic panel Lab Collect       Musculoskeletal and Integument    Other osteoporosis without current pathological fracture     Continue dietary calcium, vitamin D supplementation and fall prevention  Should be due for next infusion of retest next year  We will check 24-hour urine calcium to make sure she is getting adequate calcium intake         Relevant Orders    Calcium, urine, 24 hour Lab Collect    Creatinine, urine, 24 hour Lab Collect    PTH, intact Lab Collect Lab Collect    Phosphorus Lab Collect    Vitamin D 25 hydroxy Lab Collect       Other    History of thyroid cancer     TG detectable but stable  We will continue to monitor         Relevant Orders    Thyroglobulin Panel    Vitamin D deficiency     Continue supplementations-we will check vitamin D 25-hydroxy         Relevant Orders    Vitamin D 25 hydroxy Lab Collect            Reason for visit is   Chief Complaint   Patient presents with   • Virtual Regular Visit        Encounter provider Ana Dao MD    Provider located at [address] [place] [id number]      Recent Visits  No visits were found meeting these conditions    Showing recent visits within past 7 days and meeting all other requirements  Today's Visits  Date Type Provider Dept   12/19/22 Telephone Ana Dao MD [facility]   22 Telemedicine Alyson Frankel, MD [facility] 23   Showing today's visits and meeting all other requirements  Future Appointments  No visits were found meeting these conditions  Showing future appointments within next 150 days and meeting all other requirements       The patient was identified by name and date of birth  Bernard Vargas was informed that this is a telemedicine visit and that the visit is being conducted through the [employer]  She agrees to proceed     My office door was closed  No one else was in the room  She acknowledged consent and understanding of privacy and security of the video platform  The patient has agreed to participate and understands they can discontinue the visit at any time  Patient is aware this is a billable service  Subjective  Bernard Vargas is a 65 y o  female       HPI   67-year-old female with history of thyroid cancer, postsurgical hypothyroidism post bariatric surgery malabsorption and osteoporosis seen in follow-up  For osteoporosis she finished evenity  In 2022 and received reclast in 2022  Tolerated without any SE    She is currently taking VITAMIN D3 5000 iu daily   6 servings of dairy in her diet-generally steady on her feet-denies any frequent falls      Paramjit Casper over the summer - tripped over hose -broke 2 metacarpals and had a splint   Past Medical History:   Diagnosis Date   • Anxiety    • Cancer [facility])     thyroid   • Closed rib fracture     x2   • Disease of thyroid gland     cancer,had thyroidectomy   • Fall     last assessed 16   • Femoral neck stress fracture 10/10/2016   • Iron deficiency anemia    • Thyroid cancer ([facility] [address] ) 2019   • Viral gastroenteritis     last assessed 12       Past Surgical History:   Procedure Laterality Date   • APPENDECTOMY     • BREAST SURGERY Left    • CATARACT EXTRACTION Left     lens implant   •  SECTION     • CHOLECYSTECTOMY     • ESOPHAGOGASTRODUODENOSCOPY      diagnostic   • GASTRIC BYPASS     • HIP SURGERY Right    • HYSTERECTOMY      total abdominal   • JOINT REPLACEMENT     • KNEE ARTHROSCOPY Bilateral    • NE COLONOSCOPY FLX DX W/COLLJ SPEC WHEN PFRMD N/A 11/21/2017    Procedure: EGD AND COLONOSCOPY;  Surgeon: Reba Snyder MD;  [facility]; Service: Gastroenterology   • NE FEMORAL FX, OPEN TX Right 10/10/2016    Procedure: FEMORAL NECK FIXATION ;  Surgeon: Yi Shelley MD;  [facility];  Service: Orthopedics   • NE TREAT INTER/SUBTROCH FX,W/PLATE/SCREW Left 71/27/9571    Procedure: OPEN REDUCTION W/ INTERNAL FIXATION (ORIF) HIP WITH PLATE AND SCREWS - Left hip dynamic hip screw;  Surgeon: Adalgisa Aleman DO;  [facility];  Service: Orthopedics   • THYROIDECTOMY Bilateral     with LN dissectomy       Current Outpatient Medications   Medication Sig Dispense Refill   • Cholecalciferol (VITAMIN D3) 5000 units CAPS Take by mouth every morning       • Synthroid 125 MCG tablet Take 1 tablet Saturday and Sunday 32 tablet 1   • Synthroid 150 MCG tablet Take 150 mcg mon-Friday and 125 on sat and Sunday 90 tablet 1   • zolpidem (AMBIEN CR) 12 5 MG CR tablet Take 1 tablet (12 5 mg total) by mouth daily at bedtime as needed for sleep 30 tablet 5     No current facility-administered medications for this visit  Allergies   Allergen Reactions   • Scopolamine Other (See Comments)     Reaction Date: 10Aug2011; hypotension   • Sulfamethoxazole-Trimethoprim      Reaction Date: 10Aug2011;    • Sulfa Antibiotics Hives       Review of Systems   Gastrointestinal: Negative for constipation, diarrhea, nausea and vomiting  Musculoskeletal: Negative for gait problem  Psychiatric/Behavioral: Positive for sleep disturbance  Video Exam    There were no vitals filed for this visit  Physical Exam  Constitutional:       General: She is not in acute distress       Appearance: Normal appearance  She is not ill-appearing, toxic-appearing or diaphoretic  HENT:      Head: Normocephalic and atraumatic  Pulmonary:      Effort: Pulmonary effort is normal  No respiratory distress  Neurological:      General: No focal deficit present  Mental Status: She is alert and oriented to person, place, and time  Psychiatric:         Mood and Affect: Mood normal          Behavior: Behavior normal          Thought Content:  Thought content normal          Judgment: Judgment normal           I spent 20 minutes directly with the patient during this visit

## 2022-12-19 NOTE — TELEPHONE ENCOUNTER
----- Message from Aziza Toledo sent at 12/19/2022  7:15 AM EST -----  Regarding: FW: Visit 12/19  Contact: [phone]    ----- Message -----  From: Joe MARTINES  Sent: 12/18/2022   4:04 PM EST  To: , #  Subject: Visit 12/19                                      I was diagnosed with Flu B today  Could we make my visit on 12/19 a video visit or reschedule?

## 2022-12-22 ENCOUNTER — TELEPHONE (OUTPATIENT)
Dept: OTHER | Facility: OTHER | Age: 62
End: 2022-12-22

## 2022-12-22 NOTE — TELEPHONE ENCOUNTER
Patient is calling regarding cancelling an appointment      Date/Time:12/22/2022 @ 0800    Patient was rescheduled: YES [] NO [x]    Patient requesting call back to reschedule: YES [] NO [x]

## 2022-12-26 DIAGNOSIS — G47.09 OTHER INSOMNIA: ICD-10-CM

## 2022-12-27 RX ORDER — ZOLPIDEM TARTRATE 12.5 MG/1
12.5 TABLET, FILM COATED, EXTENDED RELEASE ORAL
Qty: 30 TABLET | Refills: 0 | Status: SHIPPED | OUTPATIENT
Start: 2022-12-27 | End: 2023-01-03 | Stop reason: SDUPTHER

## 2022-12-27 NOTE — TELEPHONE ENCOUNTER
Medication:  PDMP   11/21/2022 06/07/2022 Zolpidem Tartrate (Tablet, Extended Release)  30 0 30 12 5 MG BRADLEY MELISSA      Active agreement on file -No

## 2023-01-03 ENCOUNTER — OFFICE VISIT (OUTPATIENT)
Dept: FAMILY MEDICINE CLINIC | Facility: CLINIC | Age: 63
End: 2023-01-03

## 2023-01-03 VITALS
SYSTOLIC BLOOD PRESSURE: 130 MMHG | HEART RATE: 88 BPM | HEIGHT: 67 IN | WEIGHT: 177.6 LBS | OXYGEN SATURATION: 99 % | TEMPERATURE: 97.3 F | RESPIRATION RATE: 16 BRPM | BODY MASS INDEX: 27.88 KG/M2 | DIASTOLIC BLOOD PRESSURE: 78 MMHG

## 2023-01-03 DIAGNOSIS — D50.9 IRON DEFICIENCY ANEMIA, UNSPECIFIED IRON DEFICIENCY ANEMIA TYPE: ICD-10-CM

## 2023-01-03 DIAGNOSIS — Z00.00 ANNUAL PHYSICAL EXAM: Primary | ICD-10-CM

## 2023-01-03 DIAGNOSIS — G47.09 OTHER INSOMNIA: ICD-10-CM

## 2023-01-03 PROBLEM — M15.9 GENERALIZED OSTEOARTHRITIS: Status: ACTIVE | Noted: 2022-05-25

## 2023-01-03 PROBLEM — Z86.16 HISTORY OF COVID-19: Status: ACTIVE | Noted: 2023-01-03

## 2023-01-03 RX ORDER — ZOLPIDEM TARTRATE 12.5 MG/1
12.5 TABLET, FILM COATED, EXTENDED RELEASE ORAL
Qty: 90 TABLET | Refills: 1 | Status: SHIPPED | OUTPATIENT
Start: 2023-01-03

## 2023-01-03 NOTE — PROGRESS NOTES
[facility]    NAME: Ania Mehta  AGE: 65 y o  SEX: female  : 1960     DATE: 1/3/2023     Assessment and Plan:     Problem List Items Addressed This Visit        Other    Anemia    Relevant Orders    CBC and differential    Other insomnia    Relevant Medications    zolpidem (AMBIEN CR) 12 5 MG CR tablet    Annual physical exam - Primary     Needs to reschedule  Flu/covid vaccine up to date            Immunizations and preventive care screenings were discussed with patient today  Appropriate education was printed on patient's after visit summary  Counseling:  Dental Health: discussed importance of regular tooth brushing, flossing, and dental visits  BMI Counseling: Body mass index is 27 49 kg/m²  The BMI is above normal  Nutrition recommendations include encouraging healthy choices of fruits and vegetables  Exercise recommendations include exercising 3-5 times per week  No pharmacotherapy was ordered  Rationale for BMI follow-up plan is due to patient being overweight or obese  Depression Screening and Follow-up Plan: Patient was screened for depression during today's encounter  They screened negative with a PHQ-2 score of 0  Return in 1 year (on 1/3/2024) for Annual physical      Chief Complaint:     Chief Complaint   Patient presents with   • Physical Exam     Patient is here for her annual wellness      History of Present Illness:     Adult Annual Physical   Patient here for a comprehensive physical exam  The patient reports problems - now on reclast-seeing endocrine  Diet and Physical Activity  Diet/Nutrition: well balanced diet  Exercise: no formal exercise        Depression Screening  PHQ-2/9 Depression Screening    Little interest or pleasure in doing things: 0 - not at all  Feeling down, depressed, or hopeless: 0 - not at all  PHQ-2 Score: 0  PHQ-2 Interpretation: Negative depression screen General Health  Sleep: sleeps well  Hearing: normal - bilateral   Vision: no vision problems  Dental: regular dental visits  /GYN Health  Patient is: postmenopausal  Last menstrual period: n/a  Contraceptive method: n/a  Review of Systems:     Review of Systems   Constitutional: Negative  HENT: Negative  Eyes: Negative  Respiratory: Negative  Cardiovascular: Negative  Gastrointestinal: Negative  Endocrine: Negative  Genitourinary: Negative  Musculoskeletal: Positive for arthralgias  Allergic/Immunologic: Negative  Neurological: Negative  Psychiatric/Behavioral: Positive for sleep disturbance  Past Medical History:     Past Medical History:   Diagnosis Date   • Anxiety    • Cancer [facility])     thyroid   • Closed rib fracture     x2   • Disease of thyroid gland     cancer,had thyroidectomy   • Fall     last assessed 16   • Femoral neck stress fracture 10/10/2016   • Iron deficiency anemia    • [facility]   • Viral gastroenteritis     last assessed 12      Past Surgical History:     Past Surgical History:   Procedure Laterality Date   • APPENDECTOMY     • BREAST SURGERY Left    • CATARACT EXTRACTION Left     lens implant   •  SECTION     • CHOLECYSTECTOMY     • ESOPHAGOGASTRODUODENOSCOPY      diagnostic   • GASTRIC BYPASS     • HIP SURGERY Right    • HYSTERECTOMY      total abdominal   • JOINT REPLACEMENT     • KNEE ARTHROSCOPY Bilateral    • [place] COLONOSCOPY FLX DX W/COLLJ SPEC WHEN PFRMD N/A 2017    Procedure: EGD AND COLONOSCOPY;  Surgeon: Martina Chatterjee MD;  [facility];   Service: Gastroenterology   • NY OPTX FEM FX PROX END   [address] INT FIXJ/PROSTC RPLCMT Right 10/10/2016    Procedure: FEMORAL NECK FIXATION ;  Surgeon: Kwaku Whyte MD;  Location[facility];  Service: Orthopedics   • NY TX INTER/NY/SUBTRCHNTRIC FEMORAL FX SCREW IMPLT Left 2018    Procedure: OPEN REDUCTION W/ INTERNAL FIXATION (ORIF) HIP WITH PLATE AND SCREWS - Left hip dynamic hip screw;  Surgeon: Marleen Corona DO;  Location: [facility];  Service: Orthopedics   • THYROIDECTOMY Bilateral     with LN dissectomy      Social History:     Social History     Socioeconomic History   • Marital status:      Spouse name: None   • Number of children: None   • Years of education: None   • Highest education level: None   Occupational History   • None   Tobacco Use   • Smoking status: Never   • Smokeless tobacco: Never   Vaping Use   • Vaping Use: Never used   Substance and Sexual Activity   • Alcohol use:  Yes     Alcohol/week: 5 0 standard drinks     Types: 5 Cans of beer per week     Comment: moderate,social   • Drug use: No   • Sexual activity: Not Currently     Partners: Male     Birth control/protection: Condom Male, Post-menopausal, Female Sterilization, None   Other Topics Concern   • None   Social History Narrative    Caffeine use    Daily coffee 4 cups a day    Exercises regularly     Social Determinants of Health     Financial Resource Strain: Not on file   Food Insecurity: Not on file   Transportation Needs: Not on file   Physical Activity: Not on file   Stress: Not on file   Social Connections: Not on file   Intimate Partner Violence: Not on file   Housing Stability: Not on file      Family History:     Family History   Problem Relation Age of Onset   • Heart disease Mother         cardiac disorder   • Diabetes Mother    • Diabetes type I Mother    • Cancer Father         of mouth   • Bone cancer Maternal Grandfather    • Brain cancer Maternal Grandfather    • Lung cancer Maternal Grandfather    • Skin cancer Maternal Grandfather    • Coronary artery disease Family    • Osteoporosis Family    • Rheum arthritis Family    • Breast cancer Cousin    • Melanoma Cousin    • Lung cancer Paternal Aunt    • Throat cancer Brother    • Diabetes type I Brother    • Hypothyroidism Brother    • No Known Problems Sister    • No Known Problems Maternal Aunt    • No Known Problems Maternal Uncle    • No Known Problems Paternal Uncle    • No Known Problems Maternal Grandmother    • No Known Problems Paternal Grandmother    • No Known Problems Paternal Grandfather    • Diabetes type I Brother    • Hypothyroidism Brother    • ADD / ADHD Neg Hx    • Anesthesia problems Neg Hx    • Clotting disorder Neg Hx    • Collagen disease Neg Hx    • Dislocations Neg Hx    • Learning disabilities Neg Hx    • Neurological problems Neg Hx    • Rheumatologic disease Neg Hx    • Scoliosis Neg Hx    • Vascular Disease Neg Hx       Current Medications:     Current Outpatient Medications   Medication Sig Dispense Refill   • Cholecalciferol (VITAMIN D3) 5000 units CAPS Take by mouth every morning       • Synthroid 125 MCG tablet Take 1 tablet Saturday and Sunday 32 tablet 1   • Synthroid 150 MCG tablet Take 150 mcg mon-Friday and 125 on sat and Sunday 90 tablet 1   • zolpidem (AMBIEN CR) 12 5 MG CR tablet Take 1 tablet (12 5 mg total) by mouth daily at bedtime as needed for sleep 90 tablet 1     No current facility-administered medications for this visit  Allergies: Allergies   Allergen Reactions   • Scopolamine Other (See Comments)     Reaction Date: 10Aug2011; hypotension   • Sulfamethoxazole-Trimethoprim      Reaction Date: 10Aug2011;    • Sulfa Antibiotics Hives      Physical Exam:     /78   Pulse 88   Temp (!) 97 3 °F (36 3 °C)   Resp 16   Ht 5' 7 4" (1 712 m)   Wt 80 6 kg (177 lb 9 6 oz)   SpO2 99%   BMI 27 49 kg/m²     Physical Exam  Vitals and nursing note reviewed  Constitutional:       Appearance: Normal appearance  She is well-developed  HENT:      Head: Normocephalic and atraumatic  Right Ear: Tympanic membrane, ear canal and external ear normal       Left Ear: Tympanic membrane, ear canal and external ear normal       Nose: Nose normal    Eyes:      Extraocular Movements: Extraocular movements intact        Conjunctiva/sclera: Conjunctivae normal       Pupils: Pupils are equal, round, and reactive to light  Cardiovascular:      Rate and Rhythm: Normal rate and regular rhythm  Heart sounds: Normal heart sounds  Pulmonary:      Effort: Pulmonary effort is normal       Breath sounds: Normal breath sounds  Abdominal:      General: Abdomen is flat  Bowel sounds are normal       Palpations: Abdomen is soft  Musculoskeletal:         General: Normal range of motion  Cervical back: Normal range of motion and neck supple  Skin:     General: Skin is warm and dry  Capillary Refill: Capillary refill takes less than 2 seconds  Neurological:      General: No focal deficit present  Mental Status: She is alert and oriented to person, place, and time  Psychiatric:         Mood and Affect: Mood normal          Behavior: Behavior normal          Thought Content:  Thought content normal          Judgment: Judgment normal           Tony Chavez DO  [facility]

## 2023-01-03 NOTE — PATIENT INSTRUCTIONS

## 2023-02-16 DIAGNOSIS — E03.9 HYPOTHYROIDISM, UNSPECIFIED TYPE: ICD-10-CM

## 2023-02-16 RX ORDER — LEVOTHYROXINE SODIUM 150 MCG
TABLET ORAL
Qty: 90 TABLET | Refills: 1 | Status: SHIPPED | OUTPATIENT
Start: 2023-02-16

## 2023-03-30 DIAGNOSIS — C73 THYROID CANCER (HCC): ICD-10-CM

## 2023-03-30 RX ORDER — LEVOTHYROXINE SODIUM 125 MCG
TABLET ORAL
Qty: 32 TABLET | Refills: 1 | Status: SHIPPED | OUTPATIENT
Start: 2023-03-30

## 2023-04-13 DIAGNOSIS — J30.2 SEASONAL ALLERGIC RHINITIS, UNSPECIFIED TRIGGER: Primary | ICD-10-CM

## 2023-04-13 RX ORDER — CROMOLYN SODIUM 5.2 MG
1 AEROSOL, SPRAY WITH PUMP (ML) NASAL 4 TIMES DAILY
Qty: 26 ML | Refills: 0 | Status: SHIPPED | OUTPATIENT
Start: 2023-04-13

## 2023-07-17 ENCOUNTER — OFFICE VISIT (OUTPATIENT)
Dept: FAMILY MEDICINE CLINIC | Facility: CLINIC | Age: 63
End: 2023-07-17
Payer: COMMERCIAL

## 2023-07-17 VITALS
RESPIRATION RATE: 16 BRPM | DIASTOLIC BLOOD PRESSURE: 80 MMHG | OXYGEN SATURATION: 98 % | WEIGHT: 178.6 LBS | SYSTOLIC BLOOD PRESSURE: 130 MMHG | HEIGHT: 67 IN | TEMPERATURE: 98.1 F | BODY MASS INDEX: 28.03 KG/M2 | HEART RATE: 66 BPM

## 2023-07-17 DIAGNOSIS — Z78.0 POST-MENOPAUSAL: ICD-10-CM

## 2023-07-17 DIAGNOSIS — E66.3 OVERWEIGHT WITH BODY MASS INDEX (BMI) OF 27 TO 27.9 IN ADULT: ICD-10-CM

## 2023-07-17 DIAGNOSIS — R07.9 CHEST PAIN, UNSPECIFIED TYPE: ICD-10-CM

## 2023-07-17 DIAGNOSIS — R63.5 WEIGHT GAIN: Primary | ICD-10-CM

## 2023-07-17 PROCEDURE — 99214 OFFICE O/P EST MOD 30 MIN: CPT | Performed by: FAMILY MEDICINE

## 2023-07-17 RX ORDER — LORATADINE 10 MG/1
10 TABLET ORAL AS NEEDED
COMMUNITY

## 2023-07-17 RX ORDER — NALTREXONE HYDROCHLORIDE 50 MG/1
50 TABLET, FILM COATED ORAL DAILY
Qty: 30 TABLET | Refills: 5 | Status: SHIPPED | OUTPATIENT
Start: 2023-07-17

## 2023-07-17 NOTE — PROGRESS NOTES
Assessment/Plan:    1. Weight gain  Assessment & Plan:  Partly hormonal   Partly increased alcohol intake at times    Orders:  -     Ambulatory Referral to Obstetrics / Gynecology; Future    2. Post-menopausal  Assessment & Plan: Will refer to gyn    Orders:  -     Ambulatory Referral to Obstetrics / Gynecology; Future    3. Overweight with body mass index (BMI) of 27 to 27.9 in adult  -     naltrexone (REVIA) 50 mg tablet; Take 1 tablet (50 mg total) by mouth daily    4. Chest pain, unspecified type  Assessment & Plan:  Likely or posible gi related  Not further work up needed              There are no Patient Instructions on file for this visit. No follow-ups on file. Subjective:      Patient ID: Day Diana is a 59 y.o. female. Chief Complaint   Patient presents with   • Follow-up     Patient here for ER follow up       Here for follow up  Had chest pain all day  Dizzy and nausea  Given morphine, ct and ekg labs reviewed  Isolated event  Heartburn that night  Substernal  egd afterward was good  Has been gaining weight  Weight watchers  Hysterectomy in late 30's  Walking daily  Some calories      The following portions of the patient's history were reviewed and updated as appropriate: allergies, current medications, past family history, past medical history, past social history, past surgical history and problem list.    Review of Systems   Constitutional: Positive for unexpected weight change (weight increased). HENT: Negative. Eyes: Negative. Respiratory: Negative. Cardiovascular: Negative. Gastrointestinal: Negative. Endocrine: Negative. Genitourinary: Negative. Musculoskeletal: Negative. Skin: Negative. Allergic/Immunologic: Negative. Neurological: Negative. Hematological: Negative. Psychiatric/Behavioral: Negative.           Current Outpatient Medications   Medication Sig Dispense Refill   • Cholecalciferol (VITAMIN D3) 5000 units CAPS Take by mouth every morning       • cromolyn (NASALCHROM) 5.2 MG/ACT nasal spray 1 spray into each nostril 4 (four) times a day 26 mL 0   • loratadine (CLARITIN) 10 mg tablet Take 10 mg by mouth if needed for allergies     • naltrexone (REVIA) 50 mg tablet Take 1 tablet (50 mg total) by mouth daily 30 tablet 5   • Synthroid 125 MCG tablet TAKE 1 TABLET ON SATURDAY AND SUNDAY FOR THYROID 32 tablet 1   • Synthroid 150 MCG tablet TAKE 1 TABLET (150MCG) MONDAY THROUGH FRIDAY FOR HYPOTHYROIDISM AND 125MCG ON SATURDAY AND SUNDAY 90 tablet 1   • zolpidem (AMBIEN CR) 12.5 MG CR tablet Take 1 tablet (12.5 mg total) by mouth daily at bedtime as needed for sleep 90 tablet 1     No current facility-administered medications for this visit. Objective:    /80 (BP Location: Left arm, Patient Position: Sitting, Cuff Size: Large)   Pulse 66   Temp 98.1 °F (36.7 °C) (Tympanic)   Resp 16   Ht 5' 7.4" (1.712 m)   Wt 81 kg (178 lb 9.6 oz)   SpO2 98%   BMI 27.64 kg/m²        Physical Exam  Vitals and nursing note reviewed. Constitutional:       Appearance: Normal appearance. She is well-developed. HENT:      Head: Normocephalic and atraumatic. Nose: Nose normal.   Eyes:      General: Lids are normal.      Conjunctiva/sclera: Conjunctivae normal.      Pupils: Pupils are equal, round, and reactive to light. Cardiovascular:      Rate and Rhythm: Normal rate and regular rhythm. Heart sounds: Normal heart sounds, S1 normal and S2 normal.   Pulmonary:      Effort: Pulmonary effort is normal.      Breath sounds: Normal breath sounds. Abdominal:      General: Bowel sounds are normal.      Palpations: Abdomen is soft. Musculoskeletal:         General: Normal range of motion. Cervical back: Normal range of motion and neck supple. Skin:     General: Skin is warm and dry. Neurological:      General: No focal deficit present. Mental Status: She is alert and oriented to person, place, and time.       Deep Tendon Reflexes: Reflexes are normal and symmetric. Psychiatric:         Mood and Affect: Mood normal.         Speech: Speech normal.         Behavior: Behavior normal.         Thought Content:  Thought content normal.         Judgment: Judgment normal.                Jonathan Fails, DO

## 2024-01-02 ENCOUNTER — PATIENT MESSAGE (OUTPATIENT)
Dept: ENDOCRINOLOGY | Facility: CLINIC | Age: 64
End: 2024-01-02

## 2024-01-02 ENCOUNTER — OFFICE VISIT (OUTPATIENT)
Dept: ENDOCRINOLOGY | Facility: CLINIC | Age: 64
End: 2024-01-02
Payer: COMMERCIAL

## 2024-01-02 VITALS
HEIGHT: 67 IN | BODY MASS INDEX: 29.91 KG/M2 | SYSTOLIC BLOOD PRESSURE: 152 MMHG | DIASTOLIC BLOOD PRESSURE: 100 MMHG | HEART RATE: 83 BPM | WEIGHT: 190.6 LBS

## 2024-01-02 DIAGNOSIS — Z85.850 HISTORY OF THYROID CANCER: ICD-10-CM

## 2024-01-02 DIAGNOSIS — M81.8 OTHER OSTEOPOROSIS WITHOUT CURRENT PATHOLOGICAL FRACTURE: Primary | ICD-10-CM

## 2024-01-02 DIAGNOSIS — E55.9 VITAMIN D DEFICIENCY: ICD-10-CM

## 2024-01-02 DIAGNOSIS — E89.0 POSTOPERATIVE HYPOTHYROIDISM: ICD-10-CM

## 2024-01-02 DIAGNOSIS — K91.2 POSTGASTRECTOMY MALABSORPTION: ICD-10-CM

## 2024-01-02 DIAGNOSIS — Z90.3 POSTGASTRECTOMY MALABSORPTION: ICD-10-CM

## 2024-01-02 PROCEDURE — 99214 OFFICE O/P EST MOD 30 MIN: CPT | Performed by: INTERNAL MEDICINE

## 2024-01-02 NOTE — PROGRESS NOTES
Shala Lawrence 63 y.o. female MRN: [id number]    Encounter: [id number]      Assessment/Plan     Problem List Items Addressed This Visit          Digestive    Postgastrectomy malabsorption - Primary       Endocrine    Postoperative hypothyroidism     Continue levothyroxine at current dose-will monitor thyroid function tests         Relevant Orders    T4, free Lab Collect    TSH, 3rd generation Lab Collect       Musculoskeletal and Integument    Other osteoporosis without current pathological fracture     Continue dietary calcium, vitamin D supplementations-given post bariatric surgery malabsorption will check 24-hour urine calcium and creatinine.  Advised to schedule Reclast infusion as she was due in July of last year         Relevant Orders    PTH, intact Lab Collect Lab Collect    Phosphorus Lab Collect    Creatinine, urine, 24 hour Lab Collect    Calcium, urine, 24 hour Lab Collect       Other    History of thyroid cancer     Low risk of recurrence-monitor TG/TG antibody level         Relevant Orders    Thyroglobulin Panel    Vitamin D deficiency     Continue supplementations             CC:   Osteoporosis    History of Present Illness     HPI: 63-year-old female with postsurgical hypothyroidism, history of thyroid cancer, post bariatric surgery malabsorption, vitamin D deficiency and osteoporosis seen in follow-up    For osteoporosis she finished evenity  In June 2022 and received reclast in July 2022 and was supposed to get a second infusion in July 2023 - however did not get it   Prior to that she was not on any medications for osteoporosis since 2019-before that she was on Forteo.  She is history of femoral neck fracture in 2018 and underwent surgical repair .  She reportedly slipped and fell on her stairs August 2023. She reached to grab the banister and her hand got stock in the railing. She had bruising and swelling in the wrist and forearm -found to have a distal radius fracture was in a wrist  immobilizer for 4 weeks with healing          For hypothyroidism she is currently on Synthorid 150 mcg 5 days a week and 125 mcg 2 days a weeks - and has been taking it regulalry and properly ]      For osteoporosis she usually has 3 servings of dairy a day   For vitamin D D deficiency she currently takes Vitamin D3 5000 iu daily     No falls recently        Review of Systems    Historical Information   Past Medical History:   Diagnosis Date    Anxiety     Cancer (HCC)     thyroid    Closed rib fracture     x2    Disease of thyroid gland     cancer,had thyroidectomy    Fall     last assessed 16    Femoral neck stress fracture 10/10/2016    Iron deficiency anemia     Thyroid cancer (HCC) 2019    Viral gastroenteritis     last assessed 12     Past Surgical History:   Procedure Laterality Date    APPENDECTOMY  2006    BREAST SURGERY Left     CATARACT EXTRACTION Left     lens implant     SECTION      CHOLECYSTECTOMY      ESOPHAGOGASTRODUODENOSCOPY      diagnostic    GASTRIC BYPASS      HIP SURGERY Right     HYSTERECTOMY      total abdominal    JOINT REPLACEMENT      KNEE ARTHROSCOPY Bilateral     WI COLONOSCOPY FLX DX W/COLLJ SPEC WHEN PFRMD N/A 2017    Procedure: EGD AND COLONOSCOPY;  Surgeon: Melida Vallecillo MD;  Location: [facility];  Service: Gastroenterology    WI OPTX FEM FX PROX END NCK INT FIXJ/PROSTC RPLCMT Right 10/10/2016    Procedure: FEMORAL NECK FIXATION ;  Surgeon: Marina Benitez MD;  Location: [facility];  Service: Orthopedics    WI TX INTER/WI/SUBTRCHNTRIC FEMORAL FX SCREW IMPLT Left 2018    Procedure: OPEN REDUCTION W/ INTERNAL FIXATION (ORIF) HIP WITH PLATE AND SCREWS - Left hip dynamic hip screw;  Surgeon: Epi Peterson DO;  Location: [facility];  Service: Orthopedics    THYROIDECTOMY Bilateral     with LN dissectomy     Social History   Social History     Substance and Sexual Activity   Alcohol Use Yes    Alcohol/week: 5.0 standard drinks of alcohol     Types: 5 Cans of beer per week    Comment: moderate,social     Social History     Substance and Sexual Activity   Drug Use No     Social History     Tobacco Use   Smoking Status Never    Passive exposure: Never   Smokeless Tobacco Never     Family History:   Family History   Problem Relation Age of Onset    Heart disease Mother         cardiac disorder    Diabetes Mother     Diabetes type I Mother     Cancer Father         of mouth    Bone cancer Maternal Grandfather     Brain cancer Maternal Grandfather     Lung cancer Maternal Grandfather     Skin cancer Maternal Grandfather     Coronary artery disease Family     Osteoporosis Family     Rheum arthritis Family     Breast cancer Cousin     Melanoma Cousin     Lung cancer Paternal Aunt     Throat cancer Brother     Diabetes type I Brother     Hypothyroidism Brother     No Known Problems Sister     No Known Problems Maternal Aunt     No Known Problems Maternal Uncle     No Known Problems Paternal Uncle     No Known Problems Maternal Grandmother     No Known Problems Paternal Grandmother     No Known Problems Paternal Grandfather     Diabetes type I Brother     Hypothyroidism Brother     ADD / ADHD Neg Hx     Anesthesia problems Neg Hx     Clotting disorder Neg Hx     Collagen disease Neg Hx     Dislocations Neg Hx     Learning disabilities Neg Hx     Neurological problems Neg Hx     Rheumatologic disease Neg Hx     Scoliosis Neg Hx     Vascular Disease Neg Hx        Meds/Allergies   Current Outpatient Medications   Medication Sig Dispense Refill    Cholecalciferol (VITAMIN D3) 5000 units CAPS Take by mouth every morning        cromolyn (NASALCHROM) 5.2 MG/ACT nasal spray 1 spray into each nostril 4 (four) times a day 26 mL 0    loratadine (CLARITIN) 10 mg tablet Take 10 mg by mouth if needed for allergies      Synthroid 125 MCG tablet TAKE 1 TABLET ON SATURDAY AND SUNDAY FOR THYROID 32 tablet 1    Synthroid 150 MCG tablet TAKE 1 TABLET (150MCG) MONDAY THROUGH FRIDAY  "FOR HYPOTHYROIDISM AND 125MCG ON SATURDAY AND SUNDAY 90 tablet 1    zolpidem (AMBIEN CR) 12.5 MG CR tablet Take 1 tablet (12.5 mg total) by mouth daily at bedtime as needed for sleep 90 tablet 1    naltrexone (REVIA) 50 mg tablet Take 1 tablet (50 mg total) by mouth daily 30 tablet 5     No current facility-administered medications for this visit.     Allergies   Allergen Reactions    Scopolamine Other (See Comments)     Reaction Date: 10Aug2011; hypotension    Sulfamethoxazole-Trimethoprim      Reaction Date: 10Aug2011;     Sulfa Antibiotics Hives       Objective   Vitals: Blood pressure 152/100, pulse 83, height 5' 7.4\" (1.712 m), weight 86.5 kg (190 lb 9.6 oz).    Physical Exam  Vitals reviewed.   Constitutional:       General: She is not in acute distress.     Appearance: Normal appearance. She is not ill-appearing, toxic-appearing or diaphoretic.   HENT:      Head: Normocephalic and atraumatic.   Eyes:      General: No scleral icterus.     Extraocular Movements: Extraocular movements intact.   Cardiovascular:      Rate and Rhythm: Normal rate and regular rhythm.      Heart sounds: Normal heart sounds. No murmur heard.  Pulmonary:      Effort: Pulmonary effort is normal. No respiratory distress.      Breath sounds: Normal breath sounds. No wheezing or rales.   Abdominal:      General: There is no distension.      Palpations: Abdomen is soft.      Tenderness: There is no abdominal tenderness.   Musculoskeletal:      Cervical back: Neck supple.      Right lower leg: No edema.      Left lower leg: No edema.   Lymphadenopathy:      Cervical: No cervical adenopathy.   Skin:     General: Skin is warm and dry.   Neurological:      General: No focal deficit present.      Mental Status: She is alert and oriented to person, place, and time.      Gait: Gait normal.   Psychiatric:         Mood and Affect: Mood normal.         Behavior: Behavior normal.         Thought Content: Thought content normal.         Judgment: " "Judgment normal.         The history was obtained from the review of the chart, patient.    Lab Results:          Imaging Studies:         Results for orders placed in visit on 11/10/22    DXA bone density spine hip and pelvis                Portions of the record may have been created with voice recognition software. Occasional wrong word or \"sound a like\" substitutions may have occurred due to the inherent limitations of voice recognition software. Read the chart carefully and recognize, using context, where substitutions have occurred.    "

## 2024-01-02 NOTE — ASSESSMENT & PLAN NOTE
Continue dietary calcium, vitamin D supplementations-given post bariatric surgery malabsorption will check 24-hour urine calcium and creatinine.  Advised to schedule Reclast infusion as she was due in July of last year

## 2024-01-03 ENCOUNTER — TELEPHONE (OUTPATIENT)
Dept: ENDOCRINOLOGY | Facility: CLINIC | Age: 64
End: 2024-01-03

## 2024-01-03 NOTE — TELEPHONE ENCOUNTER
Please let her know to have the labs done now before the infusion.  Please fax in order for Reclast 5 mg IV yearly-to be given over 30 minutes x 3 infusions.  I do not know if they want a new order every year will order they will take an order for 3 years.  Please let me know    ----- Message from Archie Medina MA sent at 1/2/2024  3:12 PM EST -----  Regarding: FW: Reclast infusion  Contact: [phone]    ----- Message -----  From: Shala Cardona  Sent: 1/2/2024  12:59 PM EST  To: #  Subject: Reclast infusion                                 I called the infusion center at [facility].  They checked my chart, my infusion was on 7/25/2022  The order was only written for one dose.   Please fax a new order to [phone]  Please let me know when it gets sent so I can call and schedule.   Also, should I get my labs done before the infusion? Or doesn’t it matter?   Thanks  Adeola

## 2024-01-04 DIAGNOSIS — G47.09 OTHER INSOMNIA: ICD-10-CM

## 2024-01-04 RX ORDER — ZOLPIDEM TARTRATE 12.5 MG/1
12.5 TABLET, FILM COATED, EXTENDED RELEASE ORAL
Qty: 90 TABLET | Refills: 3 | Status: SHIPPED | OUTPATIENT
Start: 2024-01-04

## 2024-01-04 NOTE — TELEPHONE ENCOUNTER
Medication:  [facility]  [id number] 12/11/2023 07/05/2023 Zolpidem Tartrate (Tablet, Extended Release) 30.0 30 12.5 MG BRADLEY MELISSA  Active agreement on file -No

## 2024-01-17 ENCOUNTER — TELEPHONE (OUTPATIENT)
Dept: ENDOCRINOLOGY | Facility: CLINIC | Age: 64
End: 2024-01-17

## 2024-01-17 DIAGNOSIS — M81.8 OTHER OSTEOPOROSIS WITHOUT CURRENT PATHOLOGICAL FRACTURE: Primary | ICD-10-CM

## 2024-01-17 NOTE — TELEPHONE ENCOUNTER
PT called to see if Dr Marrufo would review labs from 9-25-23 and if she is ok with the levels to re-send the re class stating ok to treat with the calcium level from sept 25 2023

## 2024-01-17 NOTE — TELEPHONE ENCOUNTER
[facility] calling for orders and patient needs bmp done today for tomorrow at 1pm,  p [phone]  thank you

## 2024-01-17 NOTE — TELEPHONE ENCOUNTER
BMP ordered-orders were faxed over January 5-they are also scanned in epic .  Please check and see if they received it or not

## 2024-01-17 NOTE — TELEPHONE ENCOUNTER
Patient informed that BMP will need to be done, patient will call back to see where she have orders sent.

## 2024-01-18 ENCOUNTER — TELEPHONE (OUTPATIENT)
Dept: ENDOCRINOLOGY | Facility: CLINIC | Age: 64
End: 2024-01-18

## 2024-01-24 NOTE — TELEPHONE ENCOUNTER
I did send message to patient thru her my chart and I also updated and faxed order to the infusion.  
PT called in to change the re-class appointment to 8 am 1-26-24 will also need new labs for the infusion mailed to PT [address] [place] [phone]   
2/2 to impaired vision/maximum assist (25% patients effort)

## 2024-01-30 ENCOUNTER — OFFICE VISIT (OUTPATIENT)
Dept: OBGYN CLINIC | Facility: CLINIC | Age: 64
End: 2024-01-30
Payer: COMMERCIAL

## 2024-01-30 DIAGNOSIS — Z78.0 POST-MENOPAUSAL: ICD-10-CM

## 2024-01-30 DIAGNOSIS — R63.5 WEIGHT GAIN: Primary | ICD-10-CM

## 2024-01-30 DIAGNOSIS — R68.89 POOR MOTIVATION: ICD-10-CM

## 2024-01-30 PROBLEM — Z00.00 ANNUAL PHYSICAL EXAM: Status: RESOLVED | Noted: 2023-01-03 | Resolved: 2024-01-30

## 2024-01-30 PROCEDURE — 99205 OFFICE O/P NEW HI 60 MIN: CPT | Performed by: OBSTETRICS & GYNECOLOGY

## 2024-02-01 DIAGNOSIS — C73 THYROID CANCER (HCC): ICD-10-CM

## 2024-02-01 RX ORDER — LEVOTHYROXINE SODIUM 125 MCG
TABLET ORAL
Qty: 32 TABLET | Refills: 1 | Status: SHIPPED | OUTPATIENT
Start: 2024-02-01

## 2024-02-02 PROBLEM — E66.3 OVERWEIGHT WITH BODY MASS INDEX (BMI) OF 27 TO 27.9 IN ADULT: Status: RESOLVED | Noted: 2023-07-17 | Resolved: 2024-02-02

## 2024-02-02 PROBLEM — Z86.16 HISTORY OF COVID-19: Status: RESOLVED | Noted: 2023-01-03 | Resolved: 2024-02-02

## 2024-02-02 NOTE — PROGRESS NOTES
"Assessment/Plan:       Diagnoses and all orders for this visit:    Weight gain  -     Ambulatory Referral to Obstetrics / Gynecology  -     DHEA-sulfate; Future  -     Testosterone; Future  -     Cortisol Level, AM Specimen; Future  -     Aldosterone; Future  -     Insulin-like growth factor 1 (IGF-1); Future    Post-menopausal  -     Ambulatory Referral to Obstetrics / Gynecology    Poor motivation        1) This was a lengthy visit spent discussing the HPATG (hypothalamus/pituitary/adrenal/thyroid/gonadal) axis and the impact that hormonal deviation in one gland can have on another. It is not uncommon for ovarian declined to coincide or invoke thyroid and adrenal dysfunction as well.  2) Weight concerns are usually due to adrenal issues, often aggravated by ovarian hormonal decline with menopause. Thyroid well managed. Offered HGH and adrenal axis testing, hold on 24 hr salivary cortisol for now and start with serum. I will email her with results, but if significant abnormalities noted, will need OV follow up  3) Ovarian MHT will not achieve tissue protection or symptom resolution at this point, that \" ship has sailed\" . IF having luly symptoms it is still an option, but she really is not. Will see if androgen replacement or cortisol adaptagens will be appropriate.   4) She is not eating well, and if she could reign in the nutritional principles she knows she could get on top of this, it sounds like she is in a motivational slump. Perhaps short term phentermine would provide the boost in energy and appetite suppression she is looking for. Will consider after labs addressed.   5) Follow up prn    This was a 60 minute visit with greater than 50% of time spent in face to face counseling and coordination of care.    Subjective:      Patient ID: Shala Lawrence is a 63 y.o. female.    Shala presents for hormone consult, her first visit with me; self referred, also by Dr. Bedoya, does not see [facility] gyn.   Shala has " been menopausal for over a decade, s/p TLH/BSO with gastric bypass in 2003. Never offered HRT, her menopausal symptoms were always very mild. Her concerns are now:  1) Recurrent weight gain, despite surgery, has gained 25 lbs since her procedure. Admits to old cravings returning. Has tried intermittent fasting, tends to be a night eater anyway.   2) Poor motivation. Tries to exercise, just tired and no will. Tried naltrexone for one dose to help with binge eating behavior, did not tolerate due to side effect.   3) Concerns about osteoporosis, s/p femoral neck fracture, can not get bone density in hips due to surgery with dynamic screws. Had Reclast treatment for 2 years, followed by Dori, now doing Reclast again per endo.   PMXH: as above; hx of papillary thyroid Ca s/p thyroidectomy, managed by endo.   SHX: surgical PA with [facility]. Denies tobacco, ETOH  FHX: Mom COD DM 52.MGM CHF; MGF old age 89 Dad oral CA, HTN. PGP CVD. 2 siblings, HTN, HPV neck Ca. 2 sons, 36 and 31, healthy      Review of Systems   Constitutional:  Positive for appetite change and unexpected weight change. Negative for fatigue.   Gastrointestinal: Negative.    Endocrine: Negative.    Genitourinary: Negative.    Musculoskeletal: Negative.    Psychiatric/Behavioral:  Positive for dysphoric mood. Negative for sleep disturbance.        Objective:      There were no vitals taken for this visit.         Physical Exam

## 2024-02-28 ENCOUNTER — RA CDI HCC (OUTPATIENT)
Dept: OTHER | Facility: HOSPITAL | Age: 64
End: 2024-02-28

## 2024-03-05 ENCOUNTER — TELEPHONE (OUTPATIENT)
Dept: FAMILY MEDICINE CLINIC | Facility: CLINIC | Age: 64
End: 2024-03-05

## 2024-03-06 ENCOUNTER — OFFICE VISIT (OUTPATIENT)
Dept: FAMILY MEDICINE CLINIC | Facility: CLINIC | Age: 64
End: 2024-03-06
Payer: COMMERCIAL

## 2024-03-06 VITALS
BODY MASS INDEX: 29.57 KG/M2 | WEIGHT: 188.4 LBS | SYSTOLIC BLOOD PRESSURE: 145 MMHG | HEART RATE: 81 BPM | TEMPERATURE: 97.7 F | OXYGEN SATURATION: 96 % | RESPIRATION RATE: 16 BRPM | HEIGHT: 67 IN | DIASTOLIC BLOOD PRESSURE: 90 MMHG

## 2024-03-06 DIAGNOSIS — F32.1 CURRENT MODERATE EPISODE OF MAJOR DEPRESSIVE DISORDER WITHOUT PRIOR EPISODE (HCC): ICD-10-CM

## 2024-03-06 DIAGNOSIS — Z00.00 ANNUAL PHYSICAL EXAM: Primary | ICD-10-CM

## 2024-03-06 DIAGNOSIS — E78.1 HIGH TRIGLYCERIDES: ICD-10-CM

## 2024-03-06 PROBLEM — R07.9 CHEST PAIN: Status: RESOLVED | Noted: 2023-07-17 | Resolved: 2024-03-06

## 2024-03-06 PROCEDURE — 99396 PREV VISIT EST AGE 40-64: CPT | Performed by: FAMILY MEDICINE

## 2024-03-06 RX ORDER — MELATONIN 10 MG
TABLET, SUBLINGUAL SUBLINGUAL
COMMUNITY
Start: 2023-05-31

## 2024-03-06 RX ORDER — BUPROPION HYDROCHLORIDE 150 MG/1
150 TABLET ORAL EVERY MORNING
Qty: 90 TABLET | Refills: 1 | Status: SHIPPED | OUTPATIENT
Start: 2024-03-06 | End: 2024-09-02

## 2024-03-06 NOTE — PROGRESS NOTES
ADULT ANNUAL PHYSICAL  [facility]    NAME: Shala Lawrence  AGE: 64 y.o. SEX: female  : 1960     DATE: 3/6/2024     Assessment and Plan:     Problem List Items Addressed This Visit     Annual physical exam - Primary    High triglycerides     Check levels         Relevant Orders    Lipid Panel with Direct LDL reflex    Current moderate episode of major depressive disorder without prior episode (HCC)    Relevant Medications    buPROPion (WELLBUTRIN XL) 150 mg 24 hr tablet       Immunizations and preventive care screenings were discussed with patient today. Appropriate education was printed on patient's after visit summary.    Counseling:  Dental Health: discussed importance of regular tooth brushing, flossing, and dental visits.      Depression Screening and Follow-up Plan: Patient's depression screening was positive with a PHQ-2 score of 3. Their PHQ-9 score was 10. Patient declines further evaluation by mental health professional and/or medications. Brief counseling provided. Will re-evaluate at next office visit.         Return in 1 year (on 3/6/2025).     Chief Complaint:     Chief Complaint   Patient presents with   • Physical Exam     Patient is being seen for a physical exam.       History of Present Illness:     Adult Annual Physical   Patient here for a comprehensive physical exam. The patient reports problems - no feeling great, will see [facility] .Summer broke wrist, rib fracture trying to get dog into house, ran in to planter outside    Diet and Physical Activity  Diet/Nutrition: well balanced diet.   Exercise: walking and swimming .      Depression Screening  PHQ-2/9 Depression Screening    Little interest or pleasure in doing things: 2 - more than half the days  Feeling down, depressed, or hopeless: 1 - several days  Trouble falling or staying asleep, or sleeping too much: 2 - more than half the days  Feeling tired or having little  energy: 2 - more than half the days  Poor appetite or overeatin - more than half the days  Feeling bad about yourself - or that you are a failure or have let yourself or your family down: 1 - several days  Trouble concentrating on things, such as reading the newspaper or watching television: 0 - not at all  Moving or speaking so slowly that other people could have noticed. Or the opposite - being so fidgety or restless that you have been moving around a lot more than usual: 0 - not at all  Thoughts that you would be better off dead, or of hurting yourself in some way: 0 - not at all  PHQ-2 Score: 3  PHQ-2 Interpretation: POSITIVE depression screen  PHQ-9 Score: 10  PHQ-9 Interpretation: Moderate depression       General Health  Sleep: sleeps well.   Hearing: normal - bilateral.  Vision: no vision problems.   Dental: regular dental visits.       /GYN Health  Follows with gynecology? no   Patient is: postmenopausal  Last menstrual period: n/a  Contraceptive method: menopause.    Advanced Care Planning  Do you have an advanced directive? yes  Do you have a durable medical power of ? yes  ACP document given to the patient? no     Review of Systems:     Review of Systems   Constitutional:  Positive for fatigue.   HENT: Negative.     Eyes: Negative.    Respiratory: Negative.     Cardiovascular: Negative.    Gastrointestinal: Negative.    Endocrine: Negative.    Genitourinary: Negative.    Musculoskeletal: Negative.    Skin: Negative.    Allergic/Immunologic: Negative.    Neurological: Negative.    Hematological: Negative.    Psychiatric/Behavioral:  Positive for dysphoric mood.       Past Medical History:     Past Medical History:   Diagnosis Date   • Allergic     Sulfa, scopolamine   • Anemia    • Anxiety    • Arthritis    • Cancer (HCC)     thyroid   • Closed rib fracture     x2   • Disease of thyroid gland     cancer,had thyroidectomy   • Fall     last assessed 16   • Femoral neck stress fracture  10/10/2016   • Iron deficiency anemia    • Thyroid cancer (HCC) 2019   • Viral gastroenteritis     last assessed 12      Past Surgical History:     Past Surgical History:   Procedure Laterality Date   • APPENDECTOMY  2006   • BREAST SURGERY Left    • CATARACT EXTRACTION Left     lens implant   •  SECTION     • CHOLECYSTECTOMY     • ESOPHAGOGASTRODUODENOSCOPY      diagnostic   • EYE SURGERY  2016    Left cataract with IOLI   • FRACTURE SURGERY  ,     Right hip, left hip   • GASTRIC BYPASS     • HIP SURGERY Right    • HYSTERECTOMY      total abdominal   • JOINT REPLACEMENT     • KNEE ARTHROSCOPY Bilateral    • NC COLONOSCOPY FLX DX W/COLLJ SPEC WHEN PFRMD N/A 2017    Procedure: EGD AND COLONOSCOPY;  Surgeon: Melida Vallecillo MD;  Location: [facility];  Service: Gastroenterology   • NC OPTX FEM FX PROX END NCK INT FIXJ/PROSTC RPLCMT Right 10/10/2016    Procedure: FEMORAL NECK FIXATION ;  Surgeon: Marina Benitez MD;  Location: [facility] OR;  Service: Orthopedics   • NC TX INTER/NC/SUBTRCHNTRIC FEMORAL FX SCREW IMPLT Left 2018    Procedure: OPEN REDUCTION W/ INTERNAL FIXATION (ORIF) HIP WITH PLATE AND SCREWS - Left hip dynamic hip screw;  Surgeon: Epi Peterson DO;  Location: [facility];  Service: Orthopedics   • THYROIDECTOMY Bilateral     with LN dissectomy      Social History:     Social History     Socioeconomic History   • Marital status:      Spouse name: None   • Number of children: None   • Years of education: None   • Highest education level: None   Occupational History   • None   Tobacco Use   • Smoking status: Never     Passive exposure: Never   • Smokeless tobacco: Never   Vaping Use   • Vaping status: Never Used   Substance and Sexual Activity   • Alcohol use: Yes     Alcohol/week: 5.0 standard drinks of alcohol     Types: 5 Cans of beer per week     Comment: moderate,social   • Drug use: No   • Sexual activity: Not Currently     Partners: Male      Birth control/protection: Condom Male, Post-menopausal, Female Sterilization, None   Other Topics Concern   • None   Social History Narrative    Caffeine use    Daily coffee 4 cups a day    Exercises regularly     Social Determinants of Health     Financial Resource Strain: Not on file   Food Insecurity: Not on file   Transportation Needs: Not on file   Physical Activity: Not on file   Stress: Not on file   Social Connections: Not on file   Intimate Partner Violence: Not on file   Housing Stability: Not on file      Family History:     Family History   Problem Relation Age of Onset   • Heart disease Mother         cardiac disorder   • Diabetes Mother    • Diabetes type I Mother    • Stroke Mother    • Cancer Father         of mouth   • Bone cancer Maternal Grandfather    • Brain cancer Maternal Grandfather    • Lung cancer Maternal Grandfather    • Skin cancer Maternal Grandfather    • Coronary artery disease Family    • Osteoporosis Family    • Rheum arthritis Family    • Breast cancer Cousin    • Melanoma Cousin    • Lung cancer Paternal Aunt    • Throat cancer Brother    • Diabetes type I Brother    • Hypothyroidism Brother    • No Known Problems Sister    • No Known Problems Maternal Aunt    • No Known Problems Maternal Uncle    • No Known Problems Paternal Uncle    • Arthritis Maternal Grandmother    • No Known Problems Paternal Grandmother    • No Known Problems Paternal Grandfather    • Diabetes type I Brother    • Hypothyroidism Brother    • Diabetes Brother    • Cancer Brother    • Mental illness Son    • Depression Son    • ADD / ADHD Neg Hx    • Anesthesia problems Neg Hx    • Clotting disorder Neg Hx    • Collagen disease Neg Hx    • Dislocations Neg Hx    • Learning disabilities Neg Hx    • Neurological problems Neg Hx    • Rheumatologic disease Neg Hx    • Scoliosis Neg Hx    • Vascular Disease Neg Hx       Current Medications:     Current Outpatient Medications   Medication Sig Dispense Refill   •  "buPROPion (WELLBUTRIN XL) 150 mg 24 hr tablet Take 1 tablet (150 mg total) by mouth every morning 90 tablet 1   • Cholecalciferol (Vitamin D3) 250 MCG (00136 UT) TABS      • cromolyn (NASALCHROM) 5.2 MG/ACT nasal spray 1 spray into each nostril 4 (four) times a day 26 mL 0   • loratadine (CLARITIN) 10 mg tablet Take 10 mg by mouth if needed for allergies     • Synthroid 125 MCG tablet TAKE 1 TABLET ON SATURDAY AND SUNDAY FOR THYROID 32 tablet 1   • Synthroid 150 MCG tablet TAKE 1 TABLET (150MCG) MONDAY THROUGH FRIDAY FOR HYPOTHYROIDISM AND 125MCG ON SATURDAY AND SUNDAY 90 tablet 1   • zolpidem (AMBIEN CR) 12.5 MG CR tablet Take 1 tablet (12.5 mg total) by mouth daily at bedtime as needed for sleep 90 tablet 3     No current facility-administered medications for this visit.      Allergies:     Allergies   Allergen Reactions   • Naltrexone Chest Pain, Anaphylaxis, Shortness Of Breath and Vomiting     Tachycardic, hypotension   • Scopolamine Other (See Comments)     Reaction Date: 10Aug2011; hypotension   • Sulfamethoxazole-Trimethoprim      Reaction Date: 10Aug2011;    • Sulfa Antibiotics Hives      Physical Exam:     /90   Pulse 81   Temp 97.7 °F (36.5 °C) (Tympanic)   Resp 16   Ht 5' 7.05\" (1.703 m)   Wt 85.5 kg (188 lb 6.4 oz)   SpO2 96%   BMI 29.47 kg/m²     Physical Exam  Vitals and nursing note reviewed.   Constitutional:       Appearance: Normal appearance. She is well-developed.   HENT:      Head: Normocephalic and atraumatic.      Right Ear: External ear normal.      Left Ear: External ear normal.      Nose: Nose normal.   Eyes:      Extraocular Movements: Extraocular movements intact.      Conjunctiva/sclera: Conjunctivae normal.      Pupils: Pupils are equal, round, and reactive to light.   Cardiovascular:      Rate and Rhythm: Normal rate and regular rhythm.      Heart sounds: Normal heart sounds.   Pulmonary:      Effort: Pulmonary effort is normal.      Breath sounds: Normal breath sounds. "   Abdominal:      General: Abdomen is flat. Bowel sounds are normal.      Palpations: Abdomen is soft.   Musculoskeletal:         General: Normal range of motion.      Cervical back: Normal range of motion and neck supple.   Skin:     General: Skin is warm and dry.      Capillary Refill: Capillary refill takes less than 2 seconds.   Neurological:      General: No focal deficit present.      Mental Status: She is alert and oriented to person, place, and time.   Psychiatric:         Mood and Affect: Mood normal.         Behavior: Behavior normal.         Thought Content: Thought content normal.         Judgment: Judgment normal.          DO CRISS Sheppard [facility]

## 2024-03-15 LAB
CHOLEST SERPL-MCNC: 224 MG/DL
CHOLEST/HDLC SERPL: 3.4 {RATIO}
HDLC SERPL-MCNC: 66 MG/DL (ref 23–92)
LDLC SERPL CALC-MCNC: 130 MG/DL
NONHDLC SERPL-MCNC: 158 MG/DL
TRIGL SERPL-MCNC: 138 MG/DL

## 2024-03-20 DIAGNOSIS — E03.9 HYPOTHYROIDISM, UNSPECIFIED TYPE: ICD-10-CM

## 2024-03-20 RX ORDER — LEVOTHYROXINE SODIUM 150 MCG
TABLET ORAL
Qty: 90 TABLET | Refills: 1 | Status: SHIPPED | OUTPATIENT
Start: 2024-03-20

## 2024-03-25 DIAGNOSIS — G47.09 OTHER INSOMNIA: ICD-10-CM

## 2024-03-25 RX ORDER — ZOLPIDEM TARTRATE 12.5 MG/1
12.5 TABLET, FILM COATED, EXTENDED RELEASE ORAL
Qty: 90 TABLET | Refills: 3 | Status: SHIPPED | OUTPATIENT
Start: 2024-03-25

## 2024-04-05 DIAGNOSIS — E03.9 HYPOTHYROIDISM, UNSPECIFIED TYPE: ICD-10-CM

## 2024-04-05 RX ORDER — LEVOTHYROXINE SODIUM 150 MCG
TABLET ORAL
Qty: 90 TABLET | Refills: 1 | Status: SHIPPED | OUTPATIENT
Start: 2024-04-05

## 2024-04-18 ENCOUNTER — OFFICE VISIT (OUTPATIENT)
Dept: FAMILY MEDICINE CLINIC | Facility: CLINIC | Age: 64
End: 2024-04-18
Payer: COMMERCIAL

## 2024-04-18 VITALS
DIASTOLIC BLOOD PRESSURE: 94 MMHG | WEIGHT: 185.6 LBS | HEART RATE: 78 BPM | TEMPERATURE: 96.5 F | SYSTOLIC BLOOD PRESSURE: 138 MMHG | OXYGEN SATURATION: 100 % | RESPIRATION RATE: 16 BRPM | HEIGHT: 67 IN | BODY MASS INDEX: 29.13 KG/M2

## 2024-04-18 DIAGNOSIS — C44.320 SQUAMOUS CELL CARCINOMA, FACE: ICD-10-CM

## 2024-04-18 DIAGNOSIS — F32.1 CURRENT MODERATE EPISODE OF MAJOR DEPRESSIVE DISORDER WITHOUT PRIOR EPISODE (HCC): Primary | ICD-10-CM

## 2024-04-18 DIAGNOSIS — E89.0 POSTOPERATIVE HYPOTHYROIDISM: ICD-10-CM

## 2024-04-18 DIAGNOSIS — E78.1 HIGH TRIGLYCERIDES: ICD-10-CM

## 2024-04-18 PROCEDURE — 99214 OFFICE O/P EST MOD 30 MIN: CPT | Performed by: FAMILY MEDICINE

## 2024-04-18 RX ORDER — BUPROPION HYDROCHLORIDE 300 MG/1
300 TABLET ORAL EVERY MORNING
Qty: 90 TABLET | Refills: 1 | Status: SHIPPED | OUTPATIENT
Start: 2024-04-18 | End: 2024-10-15

## 2024-04-18 NOTE — PROGRESS NOTES
Assessment/Plan:    1. Current moderate episode of major depressive disorder without prior episode (HCC)  Assessment & Plan:  Improved but not at mental health goal  Increase to 300mg    Orders:  -     buPROPion (WELLBUTRIN XL) 300 mg 24 hr tablet; Take 1 tablet (300 mg total) by mouth every morning    2. Squamous cell carcinoma, face  Assessment & Plan:  Mohs in june      3. Postoperative hypothyroidism  Assessment & Plan:  Seeing endocrine      4. High triglycerides  Assessment & Plan:  Recheck lipids    Orders:  -     Lipid Panel with Direct LDL reflex; Future; Expected date: 09/02/2024  -     Lipid Panel with Direct LDL reflex            There are no Patient Instructions on file for this visit.    No follow-ups on file.    Subjective:      Patient ID: Shala Lawrence is a 64 y.o. female.    Chief Complaint   Patient presents with   • Follow-up     Patient being seen for 6-8 week follow up        Here for follow up  Having mohs in June   Initially felt good on 150mg for 2 weeks  And then wean and now only feels good only 3 days of the week        The following portions of the patient's history were reviewed and updated as appropriate: allergies, current medications, past family history, past medical history, past social history, past surgical history and problem list.    Review of Systems   Constitutional: Negative.    HENT: Negative.     Eyes: Negative.    Respiratory: Negative.     Cardiovascular: Negative.    Gastrointestinal: Negative.    Endocrine: Negative.    Genitourinary: Negative.    Musculoskeletal:  Positive for arthralgias and back pain.   Skin:         Skin lesion   Neurological: Negative.    Psychiatric/Behavioral:  Positive for dysphoric mood.          Current Outpatient Medications   Medication Sig Dispense Refill   • buPROPion (WELLBUTRIN XL) 300 mg 24 hr tablet Take 1 tablet (300 mg total) by mouth every morning 90 tablet 1   • Cholecalciferol (Vitamin D3) 250 MCG (83356 UT) TABS      •  "cromolyn (NASALCHROM) 5.2 MG/ACT nasal spray 1 spray into each nostril 4 (four) times a day 26 mL 0   • loratadine (CLARITIN) 10 mg tablet Take 10 mg by mouth if needed for allergies     • Synthroid 125 MCG tablet TAKE 1 TABLET ON SATURDAY AND SUNDAY FOR THYROID 32 tablet 1   • Synthroid 150 MCG tablet TAKE 1 TABLET (150MCG) MONDAY THROUGH FRIDAY FOR HYPOTHYROIDISM AND 125MCG ON SATURDAY AND SUNDAY 90 tablet 1   • zolpidem (AMBIEN CR) 12.5 MG CR tablet Take 1 tablet (12.5 mg total) by mouth daily at bedtime as needed for sleep 90 tablet 3     No current facility-administered medications for this visit.       Objective:    /94 (BP Location: Left arm, Patient Position: Sitting, Cuff Size: Standard)   Pulse 78   Temp (!) 96.5 °F (35.8 °C) (Tympanic)   Resp 16   Ht 5' 7\" (1.702 m)   Wt 84.2 kg (185 lb 9.6 oz)   SpO2 100%   BMI 29.07 kg/m²        Physical Exam  Vitals and nursing note reviewed.   Constitutional:       Appearance: Normal appearance. She is well-developed.   HENT:      Head: Normocephalic and atraumatic.      Nose: Nose normal.   Eyes:      General: Lids are normal.      Extraocular Movements: Extraocular movements intact.      Conjunctiva/sclera: Conjunctivae normal.      Pupils: Pupils are equal, round, and reactive to light.   Cardiovascular:      Rate and Rhythm: Normal rate and regular rhythm.      Heart sounds: Normal heart sounds, S1 normal and S2 normal.   Pulmonary:      Effort: Pulmonary effort is normal.      Breath sounds: Normal breath sounds.   Abdominal:      General: Bowel sounds are normal.      Palpations: Abdomen is soft.   Musculoskeletal:         General: Normal range of motion.      Cervical back: Normal range of motion and neck supple.   Skin:     General: Skin is warm and dry.   Neurological:      Mental Status: She is alert and oriented to person, place, and time.      Deep Tendon Reflexes: Reflexes are normal and symmetric.   Psychiatric:         Mood and Affect: Mood " normal.         Speech: Speech normal.         Behavior: Behavior normal.         Thought Content: Thought content normal.         Judgment: Judgment normal.                Annalisa Bedoya,

## 2024-05-24 ENCOUNTER — OFFICE VISIT (OUTPATIENT)
Dept: FAMILY MEDICINE CLINIC | Facility: CLINIC | Age: 64
End: 2024-05-24
Payer: COMMERCIAL

## 2024-05-24 VITALS
BODY MASS INDEX: 28.72 KG/M2 | OXYGEN SATURATION: 97 % | RESPIRATION RATE: 18 BRPM | TEMPERATURE: 97.7 F | HEIGHT: 67 IN | DIASTOLIC BLOOD PRESSURE: 90 MMHG | SYSTOLIC BLOOD PRESSURE: 140 MMHG | WEIGHT: 183 LBS | HEART RATE: 75 BPM

## 2024-05-24 DIAGNOSIS — G47.09 OTHER INSOMNIA: ICD-10-CM

## 2024-05-24 DIAGNOSIS — Z90.3 POSTGASTRECTOMY MALABSORPTION: ICD-10-CM

## 2024-05-24 DIAGNOSIS — C44.320 SQUAMOUS CELL CARCINOMA, FACE: ICD-10-CM

## 2024-05-24 DIAGNOSIS — M15.9 GENERALIZED OSTEOARTHRITIS: ICD-10-CM

## 2024-05-24 DIAGNOSIS — M81.8 OTHER OSTEOPOROSIS WITHOUT CURRENT PATHOLOGICAL FRACTURE: ICD-10-CM

## 2024-05-24 DIAGNOSIS — E89.0 POSTOPERATIVE HYPOTHYROIDISM: ICD-10-CM

## 2024-05-24 DIAGNOSIS — F32.1 CURRENT MODERATE EPISODE OF MAJOR DEPRESSIVE DISORDER WITHOUT PRIOR EPISODE (HCC): ICD-10-CM

## 2024-05-24 DIAGNOSIS — Z01.818 PREOP EXAMINATION: Primary | ICD-10-CM

## 2024-05-24 DIAGNOSIS — K91.2 POSTGASTRECTOMY MALABSORPTION: ICD-10-CM

## 2024-05-24 PROCEDURE — 99214 OFFICE O/P EST MOD 30 MIN: CPT | Performed by: NURSE PRACTITIONER

## 2024-05-24 NOTE — PROGRESS NOTES
"Subjective:     Shala Lawrence is a 64 y.o. female who presents to the office today for a preoperative consultation at the request of surgeon Dr Eugene Hyde who plans on performing mohs and reconstruction for squamous cell on June 11. This consultation is requested for the specific conditions prompting preoperative evaluation (i.e. because of potential affect on operative risk): age. Planned anesthesia: general. The patient has the following known anesthesia issues:  slow to recover . Patients bleeding risk: no recent abnormal bleeding. Patient does not have objections to receiving blood products if needed.    The following portions of the patient's history were reviewed and updated as appropriate: allergies, current medications, past family history, past medical history, past social history, past surgical history, and problem list.    Review of Systems  Constitutional: negative  Eyes: negative  Ears, nose, mouth, throat, and face: negative  Respiratory: negative  Cardiovascular: negative  Gastrointestinal: negative  Genitourinary:negative  Integument/breast: positive for skin cancer  Hematologic/lymphatic: negative  Musculoskeletal:negative  Neurological: negative  Behavioral/Psych: negative  Endocrine: negative  Allergic/Immunologic: negative     Objective:     /90 (BP Location: Left arm, Patient Position: Sitting, Cuff Size: Standard)   Pulse 75   Temp 97.7 °F (36.5 °C) (Tympanic)   Resp 18   Ht 5' 7\" (1.702 m)   Wt 83 kg (183 lb)   SpO2 97%   BMI 28.66 kg/m²     Family History   Problem Relation Age of Onset    Heart disease Mother         cardiac disorder    Diabetes Mother     Diabetes type I Mother     Stroke Mother     Cancer Father         of mouth    Bone cancer Maternal Grandfather     Brain cancer Maternal Grandfather     Lung cancer Maternal Grandfather     Skin cancer Maternal Grandfather     Coronary artery disease Family     Osteoporosis Family     Rheum arthritis Family     " Breast cancer Cousin     Melanoma Cousin     Lung cancer Paternal Aunt     Throat cancer Brother     Diabetes type I Brother     Hypothyroidism Brother     No Known Problems Sister     No Known Problems Maternal Aunt     No Known Problems Maternal Uncle     No Known Problems Paternal Uncle     Arthritis Maternal Grandmother     No Known Problems Paternal Grandmother     No Known Problems Paternal Grandfather     Diabetes type I Brother     Hypothyroidism Brother     Diabetes Brother     Cancer Brother     Mental illness Son     Depression Son     ADD / ADHD Neg Hx     Anesthesia problems Neg Hx     Clotting disorder Neg Hx     Collagen disease Neg Hx     Dislocations Neg Hx     Learning disabilities Neg Hx     Neurological problems Neg Hx     Rheumatologic disease Neg Hx     Scoliosis Neg Hx     Vascular Disease Neg Hx      Past Medical History:   Diagnosis Date    Allergic     Sulfa, scopolamine    Anemia     Anxiety     Arthritis     Cancer (HCC)     thyroid    Closed rib fracture     x2    Disease of thyroid gland     cancer,had thyroidectomy    Fall     last assessed 5/14/16    Femoral neck stress fracture 10/10/2016    Iron deficiency anemia     Thyroid cancer (HCC) 04/23/2019    Viral gastroenteritis     last assessed 11/29/12     Social History     Socioeconomic History    Marital status:      Spouse name: Not on file    Number of children: Not on file    Years of education: Not on file    Highest education level: Not on file   Occupational History    Not on file   Tobacco Use    Smoking status: Never     Passive exposure: Never    Smokeless tobacco: Never   Vaping Use    Vaping status: Never Used   Substance and Sexual Activity    Alcohol use: Yes     Alcohol/week: 5.0 standard drinks of alcohol     Types: 5 Cans of beer per week     Comment: moderate,social    Drug use: No    Sexual activity: Not Currently     Partners: Male     Birth control/protection: Condom Male, Post-menopausal, Female  Sterilization, None   Other Topics Concern    Not on file   Social History Narrative    Caffeine use    Daily coffee 4 cups a day    Exercises regularly     Social Determinants of Health     Financial Resource Strain: Not on file   Food Insecurity: Not on file   Transportation Needs: Not on file   Physical Activity: Not on file   Stress: Not on file   Social Connections: Not on file   Intimate Partner Violence: Not on file   Housing Stability: Not on file       Current Outpatient Medications:     buPROPion (WELLBUTRIN XL) 300 mg 24 hr tablet, Take 1 tablet (300 mg total) by mouth every morning, Disp: 90 tablet, Rfl: 1    Cholecalciferol (Vitamin D3) 250 MCG (35166 UT) TABS, , Disp: , Rfl:     cromolyn (NASALCHROM) 5.2 MG/ACT nasal spray, 1 spray into each nostril 4 (four) times a day, Disp: 26 mL, Rfl: 0    DHEA 10 MG CAPS, Take 10 mg by mouth, Disp: , Rfl:     loratadine (CLARITIN) 10 mg tablet, Take 10 mg by mouth if needed for allergies, Disp: , Rfl:     Synthroid 125 MCG tablet, TAKE 1 TABLET ON SATURDAY AND SUNDAY FOR THYROID, Disp: 32 tablet, Rfl: 1    Synthroid 150 MCG tablet, TAKE 1 TABLET (150MCG) MONDAY THROUGH FRIDAY FOR HYPOTHYROIDISM AND 125MCG ON SATURDAY AND SUNDAY, Disp: 90 tablet, Rfl: 1    zolpidem (AMBIEN CR) 12.5 MG CR tablet, Take 1 tablet (12.5 mg total) by mouth daily at bedtime as needed for sleep, Disp: 90 tablet, Rfl: 3  Allergies   Allergen Reactions    Naltrexone Chest Pain, Anaphylaxis, Shortness Of Breath and Vomiting     Tachycardic, hypotension    Scopolamine Other (See Comments)     Reaction Date: 10Aug2011; hypotension    Sulfamethoxazole-Trimethoprim      Reaction Date: 10Aug2011;     Sulfa Antibiotics Hives      General Appearance:    Alert, cooperative, no distress, appears stated age   Head:    Normocephalic, without obvious abnormality, atraumatic   Eyes:    PERRL, conjunctiva/corneas clear, EOM's intact, fundi     benign, both eyes   Ears:    Normal TM's and external ear  canals, both ears   Nose:   Nares normal, septum midline, mucosa normal, no drainage    or sinus tenderness   Throat:   Lips, mucosa, and tongue normal; teeth and gums normal   Neck:   Supple, symmetrical, trachea midline, no adenopathy;     thyroid:  no enlargement/tenderness/nodules; no carotid    bruit or JVD   Back:     Symmetric, no curvature, ROM normal, no CVA tenderness   Lungs:     Clear to auscultation bilaterally, respirations unlabored   Chest Wall:    No tenderness or deformity    Heart:    Regular rate and rhythm, S1 and S2 normal, no murmur, rub   or gallop   Breast Exam:    No tenderness, masses, or nipple abnormality   Abdomen:     Soft, non-tender, bowel sounds active all four quadrants,     no masses, no organomegaly   Genitalia:    Normal female without lesion, discharge or tenderness   Rectal:    Normal tone, no masses or tenderness; guaiac negative stool   Extremities:   Extremities normal, atraumatic, no cyanosis or edema   Pulses:   2+ and symmetric all extremities   Skin:   Skin color, texture, turgor normal, no rashes or lesions   Lymph nodes:   Cervical, supraclavicular, and axillary nodes normal   Neurologic:   CNII-XII intact, normal strength, sensation and reflexes     throughout       Predictors of intubation difficulty:   Morbid obesity? no   Anatomically abnormal facies? no   Prominent incisors? no   Receding mandible? no   Short, thick neck? no   Neck range of motion: normal   Mallampati score: I (soft palate, uvula, fauces, and tonsillar pillars visible)   Thyromental distance: < 6cm   Mouth openin  cm   Dentition:  some missing teeth    Cardiographics  ECG: normal sinus rhythm, no blocks or conduction defects, no ischemic changes  Echocardiogram: not done    Imaging  Chest x-ray: not performed     Lab Review   No visits with results within 2 Month(s) from this visit.   Latest known visit with results is:   Office Visit on 2024   Component Date Value    Cholesterol, Total  03/15/2024 224 (H)     Triglycerides 03/15/2024 138     HDL Cholesterol 03/15/2024 66     Non HDL Chol. (LDL+VLDL) 03/15/2024 158     Chol HDLC Ratio 03/15/2024 3.4     LDL Calculated 03/15/2024 130 (H)         Assessment:     64 y.o. female with planned surgery as above.    Known risk factors for perioperative complications: None    Difficulty with intubation is not anticipated.    Shala was seen today for pre-op exam.    Diagnoses and all orders for this visit:    Preop examination    Squamous cell carcinoma, face    Postoperative hypothyroidism    Postgastrectomy malabsorption    Other osteoporosis without current pathological fracture    Generalized osteoarthritis    Current moderate episode of major depressive disorder without prior episode (HCC)    Other insomnia       Cardiac Risk Estimation: low    Current medications which may produce withdrawal symptoms if withheld perioperatively: none     Plan:    Medically cleared for surgery     1. Preoperative workup as follows ECG.  2. Change in medication regimen before surgery: none, continue medication regimen including morning of surgery, with sip of water.  3. Prophylaxis for cardiac events with perioperative beta-blockers: not indicated.  4. Invasive hemodynamic monitoring perioperatively: not indicated.  5. Deep vein thrombosis prophylaxis postoperatively:regimen to be chosen by surgical team.  6. Surveillance for postoperative MI with ECG immediately postoperatively and on postoperative days 1 and 2 AND troponin levels 24 hours postoperatively and on day 4 or hospital discharge (whichever comes first): not indicated.  7. Other measures:  none

## 2024-05-30 LAB
CHOLEST SERPL-MCNC: 224 MG/DL
CHOLEST/HDLC SERPL: 3.1 {RATIO}
HDLC SERPL-MCNC: 72 MG/DL (ref 23–92)
LDLC SERPL CALC-MCNC: 113 MG/DL
NONHDLC SERPL-MCNC: 152 MG/DL
TRIGL SERPL-MCNC: 197 MG/DL

## 2024-06-06 DIAGNOSIS — Z00.6 ENCOUNTER FOR EXAMINATION FOR NORMAL COMPARISON OR CONTROL IN CLINICAL RESEARCH PROGRAM: ICD-10-CM

## 2024-08-08 DIAGNOSIS — E27.40 ADRENAL HYPOFUNCTION (HCC): Primary | ICD-10-CM

## 2024-09-07 DIAGNOSIS — E03.9 HYPOTHYROIDISM, UNSPECIFIED TYPE: ICD-10-CM

## 2024-09-07 RX ORDER — LEVOTHYROXINE SODIUM 150 MCG
TABLET ORAL
Qty: 90 TABLET | Refills: 1 | Status: SHIPPED | OUTPATIENT
Start: 2024-09-07

## 2024-10-22 ENCOUNTER — OFFICE VISIT (OUTPATIENT)
Dept: FAMILY MEDICINE CLINIC | Facility: CLINIC | Age: 64
End: 2024-10-22
Payer: COMMERCIAL

## 2024-10-22 VITALS
OXYGEN SATURATION: 97 % | WEIGHT: 179.6 LBS | DIASTOLIC BLOOD PRESSURE: 82 MMHG | SYSTOLIC BLOOD PRESSURE: 132 MMHG | HEART RATE: 94 BPM | TEMPERATURE: 97.3 F | BODY MASS INDEX: 28.19 KG/M2 | HEIGHT: 67 IN

## 2024-10-22 DIAGNOSIS — E66.3 OVERWEIGHT WITH BODY MASS INDEX (BMI) OF 28 TO 28.9 IN ADULT: ICD-10-CM

## 2024-10-22 DIAGNOSIS — M81.8 OTHER OSTEOPOROSIS WITHOUT CURRENT PATHOLOGICAL FRACTURE: ICD-10-CM

## 2024-10-22 DIAGNOSIS — E89.0 POSTOPERATIVE HYPOTHYROIDISM: Primary | ICD-10-CM

## 2024-10-22 DIAGNOSIS — F32.1 CURRENT MODERATE EPISODE OF MAJOR DEPRESSIVE DISORDER WITHOUT PRIOR EPISODE (HCC): ICD-10-CM

## 2024-10-22 DIAGNOSIS — G47.09 OTHER INSOMNIA: ICD-10-CM

## 2024-10-22 PROCEDURE — 99214 OFFICE O/P EST MOD 30 MIN: CPT | Performed by: FAMILY MEDICINE

## 2024-10-22 RX ORDER — DIPHENOXYLATE HYDROCHLORIDE AND ATROPINE SULFATE 2.5; .025 MG/1; MG/1
1 TABLET ORAL DAILY
COMMUNITY

## 2024-10-22 RX ORDER — ZOLPIDEM TARTRATE 12.5 MG/1
12.5 TABLET, FILM COATED, EXTENDED RELEASE ORAL
Qty: 90 TABLET | Refills: 3 | Status: SHIPPED | OUTPATIENT
Start: 2024-10-22

## 2024-10-22 RX ORDER — BUPROPION HYDROCHLORIDE 300 MG/1
300 TABLET ORAL EVERY MORNING
Qty: 90 TABLET | Refills: 1 | Status: SHIPPED | OUTPATIENT
Start: 2024-10-22 | End: 2025-04-20

## 2024-10-22 RX ORDER — PHENTERMINE HYDROCHLORIDE 37.5 MG/1
37.5 TABLET ORAL DAILY
Qty: 30 TABLET | Refills: 3 | Status: SHIPPED | OUTPATIENT
Start: 2024-10-22

## 2024-10-22 NOTE — ASSESSMENT & PLAN NOTE
Orders:    zolpidem (AMBIEN CR) 12.5 MG CR tablet; Take 1 tablet (12.5 mg total) by mouth daily at bedtime as needed for sleep

## 2024-10-22 NOTE — ASSESSMENT & PLAN NOTE
Stable on wellbutrin    Orders:    buPROPion (WELLBUTRIN XL) 300 mg 24 hr tablet; Take 1 tablet (300 mg total) by mouth every morning

## 2024-10-22 NOTE — PROGRESS NOTES
"Ambulatory Visit  Name: Shala Lawrence      : 1960      MRN: [id number]  Encounter Provider: Annalisa Bedoya DO  Encounter Date: 10/22/2024   Encounter department: [facility]    Assessment & Plan  Postoperative hypothyroidism  On synthroid         Current moderate episode of major depressive disorder without prior episode (HCC)  Stable on wellbutrin    Orders:    buPROPion (WELLBUTRIN XL) 300 mg 24 hr tablet; Take 1 tablet (300 mg total) by mouth every morning    Overweight with body mass index (BMI) of 28 to 28.9 in adult    Trial adipex for high stress eating times    Orders:    phentermine (ADIPEX-P) 37.5 MG tablet; Take 1 tablet (37.5 mg total) by mouth in the morning    Other insomnia    Orders:    zolpidem (AMBIEN CR) 12.5 MG CR tablet; Take 1 tablet (12.5 mg total) by mouth daily at bedtime as needed for sleep    Other osteoporosis without current pathological fracture  Seeing endocrine  Had prolia  Will get dexa in Nov              History of Present Illness     History of Present Illness  Feeling good  Feeling wellbutrin is working  Dose is now at good level  Frustrated with weight  Moving more  Walking 3-5 miles  Stress eat         Review of Systems   Constitutional: Negative.    HENT: Negative.     Eyes: Negative.    Respiratory: Negative.     Cardiovascular: Negative.    Gastrointestinal: Negative.    Endocrine: Negative.    Genitourinary: Negative.    Musculoskeletal: Negative.    Allergic/Immunologic: Negative.    Neurological: Negative.    Hematological: Negative.    Psychiatric/Behavioral: Negative.       Objective     /82 (BP Location: Left arm, Patient Position: Sitting, Cuff Size: Large)   Pulse 94   Temp (!) 97.3 °F (36.3 °C) (Tympanic)   Ht 5' 7\" (1.702 m)   Wt 81.5 kg (179 lb 9.6 oz)   SpO2 97%   BMI 28.13 kg/m²     Physical Exam    Physical Exam  Vitals and nursing note reviewed.   Constitutional:       Appearance: Normal appearance. She is " well-developed.   HENT:      Head: Normocephalic and atraumatic.      Right Ear: External ear normal.      Left Ear: External ear normal.      Nose: Nose normal.   Eyes:      Extraocular Movements: Extraocular movements intact.      Conjunctiva/sclera: Conjunctivae normal.      Pupils: Pupils are equal, round, and reactive to light.   Cardiovascular:      Rate and Rhythm: Normal rate and regular rhythm.      Heart sounds: Normal heart sounds.   Pulmonary:      Effort: Pulmonary effort is normal.      Breath sounds: Normal breath sounds.   Abdominal:      General: Bowel sounds are normal.      Palpations: Abdomen is soft.   Musculoskeletal:         General: Normal range of motion.      Cervical back: Normal range of motion and neck supple.   Skin:     General: Skin is warm and dry.      Capillary Refill: Capillary refill takes less than 2 seconds.   Neurological:      General: No focal deficit present.      Mental Status: She is alert and oriented to person, place, and time.   Psychiatric:         Mood and Affect: Mood normal.         Behavior: Behavior normal.         Thought Content: Thought content normal.         Judgment: Judgment normal.

## 2024-10-22 NOTE — ASSESSMENT & PLAN NOTE
Trial adipex for high stress eating times    Orders:    phentermine (ADIPEX-P) 37.5 MG tablet; Take 1 tablet (37.5 mg total) by mouth in the morning

## 2024-12-10 ENCOUNTER — TELEPHONE (OUTPATIENT)
Dept: ADMINISTRATIVE | Facility: OTHER | Age: 64
End: 2024-12-10

## 2024-12-10 NOTE — TELEPHONE ENCOUNTER
----- Message from Cheryle FRANCISCO sent at 12/9/2024  7:51 AM EST -----  Regarding: care gap request  12/09/24 7:51 AM    Hello, our patient attached above has had DEXA Scan completed/performed. Please assist in updating the patient chart by pulling the Care Everywhere (CE) document. The date of service is 12/3/2024.     Thank you,  Cheryle SERRANO

## 2024-12-10 NOTE — TELEPHONE ENCOUNTER
Upon review of the In Basket request we were able to locate, review, and update the patient chart as requested for DEXA Scan.    Any additional questions or concerns should be emailed to the Practice Liaisons via the appropriate education email address, please do not reply via In Basket.    Thank you  Elizabeth Price MA   [facility]

## 2025-01-08 DIAGNOSIS — E66.3 OVERWEIGHT WITH BODY MASS INDEX (BMI) OF 28 TO 28.9 IN ADULT: ICD-10-CM

## 2025-01-09 RX ORDER — PHENTERMINE HYDROCHLORIDE 37.5 MG/1
37.5 TABLET ORAL DAILY
Qty: 30 TABLET | Refills: 0 | Status: SHIPPED | OUTPATIENT
Start: 2025-01-09

## 2025-01-09 NOTE — TELEPHONE ENCOUNTER
Medication:  PDMP   11/23/2024 10/22/2024 Phentermine Hcl (Tablet) 30.0 30 37.5 MG NA JOSE MELISSA     Active agreement on file -No

## 2025-02-16 DIAGNOSIS — E66.3 OVERWEIGHT WITH BODY MASS INDEX (BMI) OF 28 TO 28.9 IN ADULT: ICD-10-CM

## 2025-02-16 NOTE — ASSESSMENT & PLAN NOTE
Last TSH high normal   Repeat TSH/Free T4 and will increase Synthroid if need to keep TSH in low normal range 
She has increased supplements to 8,000 units daily after last lab test   Check Vitamin D level 
Stable  Will order thyroglobulin panel before next visit for continued monitoring 
Waiting to hear back about Forteo approval from insurance  She should get 3 more months of forteo treatment  When forteo she completes an additional 3 months of forteo OR if forteo is not approved, will start prolia  She was given info on prolia  She will let us know after when she completes 3 more months of forteo so we can start prolia approval process  Continue calcium and Vitamin d  Continue regular exercise  She has avoided crossfit and heavy weight lifting 
Female

## 2025-02-17 RX ORDER — PHENTERMINE HYDROCHLORIDE 37.5 MG/1
37.5 TABLET ORAL DAILY
Qty: 30 TABLET | Refills: 0 | Status: SHIPPED | OUTPATIENT
Start: 2025-02-17

## 2025-02-17 NOTE — TELEPHONE ENCOUNTER
Medication:  PDMP   01/09/2025 01/09/2025 Phentermine Hcl (Tablet) 30.0 30 37.5 MG NA LOREN HENDRICKSON       Active agreement on file -No

## 2025-03-24 ENCOUNTER — OFFICE VISIT (OUTPATIENT)
Dept: FAMILY MEDICINE CLINIC | Facility: CLINIC | Age: 65
End: 2025-03-24
Payer: COMMERCIAL

## 2025-03-24 VITALS
OXYGEN SATURATION: 100 % | SYSTOLIC BLOOD PRESSURE: 140 MMHG | BODY MASS INDEX: 27.25 KG/M2 | DIASTOLIC BLOOD PRESSURE: 86 MMHG | HEART RATE: 96 BPM | TEMPERATURE: 97.7 F | WEIGHT: 173.6 LBS | RESPIRATION RATE: 16 BRPM | HEIGHT: 67 IN

## 2025-03-24 DIAGNOSIS — G47.09 OTHER INSOMNIA: ICD-10-CM

## 2025-03-24 DIAGNOSIS — E78.1 HIGH TRIGLYCERIDES: ICD-10-CM

## 2025-03-24 DIAGNOSIS — M81.8 OTHER OSTEOPOROSIS WITHOUT CURRENT PATHOLOGICAL FRACTURE: ICD-10-CM

## 2025-03-24 DIAGNOSIS — F32.1 CURRENT MODERATE EPISODE OF MAJOR DEPRESSIVE DISORDER WITHOUT PRIOR EPISODE (HCC): ICD-10-CM

## 2025-03-24 DIAGNOSIS — Z00.00 ANNUAL PHYSICAL EXAM: Primary | ICD-10-CM

## 2025-03-24 DIAGNOSIS — E89.0 POSTOPERATIVE HYPOTHYROIDISM: ICD-10-CM

## 2025-03-24 DIAGNOSIS — E66.3 OVERWEIGHT WITH BODY MASS INDEX (BMI) OF 28 TO 28.9 IN ADULT: ICD-10-CM

## 2025-03-24 DIAGNOSIS — C44.320 SQUAMOUS CELL CARCINOMA, FACE: ICD-10-CM

## 2025-03-24 PROCEDURE — 99396 PREV VISIT EST AGE 40-64: CPT | Performed by: FAMILY MEDICINE

## 2025-03-24 RX ORDER — PHENTERMINE HYDROCHLORIDE 37.5 MG/1
37.5 TABLET ORAL DAILY
Qty: 30 TABLET | Refills: 3 | Status: SHIPPED | OUTPATIENT
Start: 2025-03-24

## 2025-03-24 RX ORDER — ZOLPIDEM TARTRATE 12.5 MG/1
12.5 TABLET, FILM COATED, EXTENDED RELEASE ORAL
Qty: 90 TABLET | Refills: 3 | Status: SHIPPED | OUTPATIENT
Start: 2025-03-24

## 2025-03-24 NOTE — ASSESSMENT & PLAN NOTE
Down 20 pound  Full pills  Orders:  •  phentermine (ADIPEX-P) 37.5 MG tablet; Take 1 tablet (37.5 mg total) by mouth in the morning

## 2025-03-24 NOTE — PROGRESS NOTES
Adult Annual Physical  Name: Shala Lawrence      : 1960      MRN: [id number]  Encounter Provider: Annalisa Bedoya DO  Encounter Date: 3/24/2025   Encounter department: [facility]    Assessment & Plan  Annual physical exam  Seen by endocrinologist - diagnosed CLL  Has hematolgist appt  Had polyps -needs to see within [facility]         Squamous cell carcinoma, face  Dr. Berger       Current moderate episode of major depressive disorder without prior episode (HCC)  Depression Screening Follow-up Plan: Patient's depression screening was positive with a PHQ-9 score of 5. Patient with underlying depression and was advised to continue current medications as prescribed.         Postoperative hypothyroidism  Seeing endocrine       Other osteoporosis without current pathological fracture  Some improvement  [facility]  No meds       Overweight with body mass index (BMI) of 28 to 28.9 in adult    Down 20 pound  Full pills  Orders:  •  phentermine (ADIPEX-P) 37.5 MG tablet; Take 1 tablet (37.5 mg total) by mouth in the morning    High triglycerides    Orders:  •  Lipid Panel with Direct LDL reflex; Future    Other insomnia    Orders:  •  zolpidem (AMBIEN CR) 12.5 MG CR tablet; Take 1 tablet (12.5 mg total) by mouth daily at bedtime as needed for sleep    Preventive Screenings:    - Breast cancer screening: screening up-to-date     Immunizations:  - Immunizations due: Prevnar 20         History of Present Illness     Adult Annual Physical:  Patient presents for annual physical.     Diet and Physical Activity:  - Diet/Nutrition: well balanced diet.  - Exercise: walking, 1-2 times a week on average and 30-60 minutes on average.    Depression Screening:    - PHQ-9 Score: 5    General Health:  - Sleep: sleeps poorly.  - Hearing: normal hearing right ear and normal hearing left ear.  - Vision: no vision problems.  - Dental: no dental visits for > 1 year, brushes teeth twice daily and floss  "regularly.    /GYN Health:  - Follows with GYN: no.   - Menopause: postmenopausal.   - Last menstrual cycle: 1/7/2003.   - History of STDs: no  - Contraception: menopause.      Advanced Care Planning:  - Has an advanced directive?: no    - Has a durable medical POA?: no      Review of Systems  Medical History Reviewed by provider this encounter:  Tobacco  Allergies  Meds  Problems  Med Hx  Surg Hx  Fam Hx     .    Objective   /86 (BP Location: Left arm, Patient Position: Sitting, Cuff Size: Standard)   Pulse 96   Temp 97.7 °F (36.5 °C) (Tympanic)   Resp 16   Ht 5' 7\" (1.702 m)   Wt 78.7 kg (173 lb 9.6 oz)   LMP 01/07/2003   SpO2 100%   BMI 27.19 kg/m²     Physical Exam  Vitals and nursing note reviewed.   Constitutional:       Appearance: Normal appearance. She is well-developed.   HENT:      Head: Normocephalic and atraumatic.      Right Ear: External ear normal.      Left Ear: External ear normal.      Nose: Nose normal.   Eyes:      Extraocular Movements: Extraocular movements intact.      Conjunctiva/sclera: Conjunctivae normal.      Pupils: Pupils are equal, round, and reactive to light.   Cardiovascular:      Rate and Rhythm: Normal rate and regular rhythm.      Heart sounds: Normal heart sounds.   Pulmonary:      Effort: Pulmonary effort is normal.      Breath sounds: Normal breath sounds.   Abdominal:      General: Abdomen is flat. Bowel sounds are normal.      Palpations: Abdomen is soft.   Musculoskeletal:         General: Normal range of motion.      Cervical back: Normal range of motion and neck supple.   Skin:     General: Skin is warm and dry.      Capillary Refill: Capillary refill takes less than 2 seconds.   Neurological:      General: No focal deficit present.      Mental Status: She is alert and oriented to person, place, and time.   Psychiatric:         Mood and Affect: Mood normal.         Behavior: Behavior normal.         Thought Content: Thought content normal.         " Judgment: Judgment normal.

## 2025-03-24 NOTE — PATIENT INSTRUCTIONS
"Patient Education     Routine physical for adults   The Basics   Written by the doctors and editors at [facility]   What is a physical? -- A physical is a routine visit, or \"check-up,\" with your doctor. You might also hear it called a \"wellness visit\" or \"preventive visit.\"  During each visit, the doctor will:   Ask about your physical and mental health   Ask about your habits, behaviors, and lifestyle   Do an exam   Give you vaccines if needed   Talk to you about any medicines you take   Give advice about your health   Answer your questions  Getting regular check-ups is an important part of taking care of your health. It can help your doctor find and treat any problems you have. But it's also important for preventing health problems.  A routine physical is different from a \"sick visit.\" A sick visit is when you see a doctor because of a health concern or problem. Since physicals are scheduled ahead of time, you can think about what you want to ask the doctor.  How often should I get a physical? -- It depends on your age and health. In general, for people age 21 years and older:   If you are younger than 50 years, you might be able to get a physical every 3 years.   If you are 50 years or older, your doctor might recommend a physical every year.  If you have an ongoing health condition, like diabetes or high blood pressure, your doctor will probably want to see you more often.  What happens during a physical? -- In general, each visit will include:   Physical exam - The doctor or nurse will check your height, weight, heart rate, and blood pressure. They will also look at your eyes and ears. They will ask about how you are feeling and whether you have any symptoms that bother you.   Medicines - It's a good idea to bring a list of all the medicines you take to each doctor visit. Your doctor will talk to you about your medicines and answer any questions. Tell them if you are having any side effects that bother you. You " "should also tell them if you are having trouble paying for any of your medicines.   Habits and behaviors - This includes:   Your diet   Your exercise habits   Whether you smoke, drink alcohol, or use drugs   Whether you are sexually active   Whether you feel safe at home  Your doctor will talk to you about things you can do to improve your health and lower your risk of health problems. They will also offer help and support. For example, if you want to quit smoking, they can give you advice and might prescribe medicines. If you want to improve your diet or get more physical activity, they can help you with this, too.   Lab tests, if needed - The tests you get will depend on your age and situation. For example, your doctor might want to check your:   Cholesterol   Blood sugar   Iron level   Vaccines - The recommended vaccines will depend on your age, health, and what vaccines you already had. Vaccines are very important because they can prevent certain serious or deadly infections.   Discussion of screening - \"Screening\" means checking for diseases or other health problems before they cause symptoms. Your doctor can recommend screening based on your age, risk, and preferences. This might include tests to check for:   Cancer, such as breast, prostate, cervical, ovarian, colorectal, prostate, lung, or skin cancer   Sexually transmitted infections, such as chlamydia and gonorrhea   Mental health conditions like depression and anxiety  Your doctor will talk to you about the different types of screening tests. They can help you decide which screenings to have. They can also explain what the results might mean.   Answering questions - The physical is a good time to ask the doctor or nurse questions about your health. If needed, they can refer you to other doctors or specialists, too.  Adults older than 65 years often need other care, too. As you get older, your doctor will talk to you about:   How to prevent falling at " home   Hearing or vision tests   Memory testing   How to take your medicines safely   Making sure that you have the help and support you need at home  All topics are updated as new evidence becomes available and our peer review process is complete.  This topic retrieved from [employer] on: May 02, 2024.  Topic 934349 Version 1.0  Release: 32.4.3 - C32.122  © 2024 UpToDate, Inc. and/or its affiliates. All rights reserved.  Consumer Information Use and Disclaimer   Disclaimer: This generalized information is a limited summary of diagnosis, treatment, and/or medication information. It is not meant to be comprehensive and should be used as a tool to help the user understand and/or assess potential diagnostic and treatment options. It does NOT include all information about conditions, treatments, medications, side effects, or risks that may apply to a specific patient. It is not intended to be medical advice or a substitute for the medical advice, diagnosis, or treatment of a health care provider based on the health care provider's examination and assessment of a patient's specific and unique circumstances. Patients must speak with a health care provider for complete information about their health, medical questions, and treatment options, including any risks or benefits regarding use of medications. This information does not endorse any treatments or medications as safe, effective, or approved for treating a specific patient. UpToDate, Inc. and its affiliates disclaim any warranty or liability relating to this information or the use thereof.The use of this information is governed by the Terms of Use, available at https://www.woltersPerkvilleuwer.com/en/know/clinical-effectiveness-terms. 2024© UpToDate, Inc. and its affiliates and/or licensors. All rights reserved.  Copyright   © 2024 UpToDate, Inc. and/or its affiliates. All rights reserved.

## 2025-03-24 NOTE — ASSESSMENT & PLAN NOTE
Seen by endocrinologist - diagnosed CLL  Has hematolgist appt  Had polyps -needs to see within [facility]

## 2025-03-24 NOTE — ASSESSMENT & PLAN NOTE
Orders:  •  zolpidem (AMBIEN CR) 12.5 MG CR tablet; Take 1 tablet (12.5 mg total) by mouth daily at bedtime as needed for sleep

## 2025-04-12 DIAGNOSIS — F32.1 CURRENT MODERATE EPISODE OF MAJOR DEPRESSIVE DISORDER WITHOUT PRIOR EPISODE (HCC): ICD-10-CM

## 2025-04-13 RX ORDER — BUPROPION HYDROCHLORIDE 300 MG/1
300 TABLET ORAL EVERY MORNING
Qty: 90 TABLET | Refills: 1 | Status: SHIPPED | OUTPATIENT
Start: 2025-04-13 | End: 2025-10-10

## 2025-04-21 LAB
CHOLEST SERPL-MCNC: 216 MG/DL
CHOLEST/HDLC SERPL: 2.8 {RATIO}
HDLC SERPL-MCNC: 77 MG/DL (ref 23–92)
LDLC SERPL CALC-MCNC: 110 MG/DL
NONHDLC SERPL-MCNC: 139 MG/DL
TRIGL SERPL-MCNC: 146 MG/DL

## 2025-04-22 ENCOUNTER — RESULTS FOLLOW-UP (OUTPATIENT)
Dept: FAMILY MEDICINE CLINIC | Facility: CLINIC | Age: 65
End: 2025-04-22

## (undated) DEVICE — SUT STRATAFIX SPIRAL 1-0  CT-1 30 X 30CM SXPD2B403

## (undated) DEVICE — WEBRIL 6 IN UNSTERILE

## (undated) DEVICE — DRAPE ISOLATION

## (undated) DEVICE — 3.2MM DRILL BIT/QC/145MM

## (undated) DEVICE — DRESSING MEPILEX AG BORDER 4 X 8 IN

## (undated) DEVICE — 2.5MM THREADED GUIDE WIRE SPADE POINT 230MM

## (undated) DEVICE — PACK GENERAL LF

## (undated) DEVICE — ASTOUND SURGICAL GOWN, XXX LARGE, X-LONG: Brand: CONVERTORS

## (undated) DEVICE — ASTOUND STANDARD SURGICAL GOWN, XL: Brand: CONVERTORS

## (undated) DEVICE — BIPOLAR SEALER 23-301-1 AQM MBS: Brand: AQUAMANTYS™

## (undated) DEVICE — GLOVE INDICATOR PI UNDERGLOVE SZ 8.5 BLUE

## (undated) DEVICE — REPEL LITE CUT REST SURGICAL GLV LNRS X-LG: Brand: REPEL

## (undated) DEVICE — SUT ETHIBOND 2 V-37 30 IN MX69G

## (undated) DEVICE — GLOVE SRG BIOGEL 8.5

## (undated) DEVICE — 3M™ STERI-DRAPE™  ISOLATION DRAPE WITH INCISE FILM AND POUCH 1017: Brand: STERI-DRAPE™

## (undated) DEVICE — DRESSING MEPILEX AG BORDER 4 X 10 IN

## (undated) DEVICE — SUT VICRYL 0 CP-1 27 IN J267H

## (undated) DEVICE — ADHESIVE SKN CLSR HISTOACRYL FLEX 0.5ML LF

## (undated) DEVICE — SUT STRATAFIX SPIRAL 3-0 PGA/PCL 30 X 30 CM SXMD2B410

## (undated) DEVICE — SPONGE LAP 18 X 18 IN

## (undated) DEVICE — LIGHT GLOVE GREEN

## (undated) DEVICE — BASIC DOUBLE BASIN 2-LF: Brand: MEDLINE INDUSTRIES, INC.

## (undated) DEVICE — BASIN WASH

## (undated) DEVICE — SUT VICRYL 2-0 CT-1 27 IN J259H